# Patient Record
Sex: FEMALE | Race: WHITE | NOT HISPANIC OR LATINO | Employment: OTHER | URBAN - METROPOLITAN AREA
[De-identification: names, ages, dates, MRNs, and addresses within clinical notes are randomized per-mention and may not be internally consistent; named-entity substitution may affect disease eponyms.]

---
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
## 2018-01-08 ENCOUNTER — OFFICE VISIT (OUTPATIENT)
Dept: FAMILY MEDICINE CLINIC | Facility: CLINIC | Age: 18
End: 2018-01-08

## 2018-01-08 VITALS
BODY MASS INDEX: 33.07 KG/M2 | HEIGHT: 67 IN | DIASTOLIC BLOOD PRESSURE: 98 MMHG | OXYGEN SATURATION: 98 % | SYSTOLIC BLOOD PRESSURE: 164 MMHG | TEMPERATURE: 98.4 F | HEART RATE: 91 BPM | WEIGHT: 210.7 LBS

## 2018-01-08 DIAGNOSIS — Z00.00 ROUTINE GENERAL MEDICAL EXAMINATION AT A HEALTH CARE FACILITY: ICD-10-CM

## 2018-01-08 DIAGNOSIS — R63.1 INCREASED THIRST: ICD-10-CM

## 2018-01-08 DIAGNOSIS — N92.6 MENSTRUAL CYCLE PROBLEM: ICD-10-CM

## 2018-01-08 DIAGNOSIS — Z83.3 FAMILY HISTORY OF DIABETES MELLITUS: Primary | ICD-10-CM

## 2018-01-08 DIAGNOSIS — R42 DIZZINESS: ICD-10-CM

## 2018-01-08 DIAGNOSIS — I10 ESSENTIAL HYPERTENSION: ICD-10-CM

## 2018-01-08 DIAGNOSIS — R00.2 HEART PALPITATIONS: ICD-10-CM

## 2018-01-08 LAB — HBA1C MFR BLD: 5.5 %

## 2018-01-08 PROCEDURE — 93000 ELECTROCARDIOGRAM COMPLETE: CPT | Performed by: NURSE PRACTITIONER

## 2018-01-08 PROCEDURE — 83036 HEMOGLOBIN GLYCOSYLATED A1C: CPT | Performed by: NURSE PRACTITIONER

## 2018-01-08 PROCEDURE — 99214 OFFICE O/P EST MOD 30 MIN: CPT | Performed by: NURSE PRACTITIONER

## 2018-01-08 RX ORDER — LOSARTAN POTASSIUM 50 MG/1
50 TABLET ORAL DAILY
Qty: 90 TABLET | Refills: 3 | Status: SHIPPED | OUTPATIENT
Start: 2018-01-08 | End: 2018-03-19 | Stop reason: SDUPTHER

## 2018-01-08 NOTE — PROGRESS NOTES
"Myrna Bauman is a 17 y.o. female.     History of Present Illness   Patient's presenting to the office today with concerns of her blood pressure because she's had an elevated blood pressure the last 3 times that she's been to the immediate care center or the emergency room.  She went to the emergency room of her getting in a fight with her boyfriend and she went to the immediate care center for sick symptoms.  All 3 times that she was there her blood pressure was elevated.  Her blood pressure is also elevated today.  She has been having some headaches.    She's also concerned about having diabetes her father is a diabetic and also with her sister.  She's been having some dizziness and she feels like she is thirsty a lot.  She does eat consistently so her dizziness seems to be random.  She does states that she is having palpitations at times and feels her heart beat fast and that is associated with the dizziness at times.     She's on her period for the last 11 days she does not have an OB/GYN she's never had a Pap smear she's not any birth control.      The following portions of the patient's history were reviewed and updated as appropriate: allergies, current medications, past social history and problem list.    Review of Systems   Constitutional: Negative for fever.   Genitourinary: Positive for menstrual problem.   Neurological: Positive for dizziness and numbness.   All other systems reviewed and are negative.      Objective   /98 (BP Location: Right arm, Patient Position: Sitting)  Pulse 91  Temp 98.4 °F (36.9 °C)  Ht 170.2 cm (67.01\")  Wt 95.6 kg (210 lb 11.2 oz)  SpO2 98%  BMI 32.99 kg/m2  Physical Exam   Constitutional: She is oriented to person, place, and time. Vital signs are normal. She appears well-developed and well-nourished. No distress.   HENT:   Head: Normocephalic.   Cardiovascular: Normal rate, regular rhythm and normal heart sounds.    Pulmonary/Chest: Effort normal " and breath sounds normal.   Neurological: She is alert and oriented to person, place, and time. Gait normal.   Psychiatric: She has a normal mood and affect. Her behavior is normal. Judgment and thought content normal.   Vitals reviewed.      ECG 12 Lead  Date/Time: 1/8/2018 3:37 PM  Performed by: BERNARDO NICHOLAS  Authorized by: BERNARDO NICHOLAS   Comparison: not compared with previous ECG   Previous ECG: no previous ECG available  Rhythm: sinus rhythm  Rate: normal  Conduction: conduction normal  ST Segments: ST segments normal  T Waves: T waves normal  QRS axis: normal  Other: no other findings  Clinical impression: non-specific ECG  Comments: Sinus arrhythmia            Assessment/Plan   Problem List Items Addressed This Visit        Cardiovascular and Mediastinum    Essential hypertension    Relevant Medications    losartan (COZAAR) 50 MG tablet    Heart palpitations    Relevant Orders    Adult Transthoracic Echo Complete W/ Cont if Necessary Per Protocol    ECG 12 Lead    Cardiac Event Monitor       Genitourinary    Menstrual cycle problem    Relevant Orders    Ambulatory Referral to Obstetrics / Gynecology       Other    Family history of diabetes mellitus - Primary    Relevant Orders    POC Glycosylated Hemoglobin (Hb A1C) (Completed)    Increased thirst    Relevant Orders    POC Glycosylated Hemoglobin (Hb A1C) (Completed)    Dizziness    Relevant Orders    Adult Transthoracic Echo Complete W/ Cont if Necessary Per Protocol    Cardiac Event Monitor      Other Visit Diagnoses     Routine general medical examination at a health care facility        Relevant Orders    CBC & Differential    Comprehensive Metabolic Panel    Lipid Panel With LDL / HDL Ratio        rto in 4 weeks for BP check  Referral to OBGYN  Follow up after labs and test  Discussed need for weight loss, diet and exercise to help with BS and BP

## 2018-01-09 LAB
ALBUMIN SERPL-MCNC: 4.7 G/DL (ref 3.5–5.2)
ALBUMIN/GLOB SERPL: 1.4 G/DL
ALP SERPL-CCNC: 82 U/L (ref 39–117)
ALT SERPL-CCNC: 17 U/L (ref 1–33)
AST SERPL-CCNC: 19 U/L (ref 1–32)
BASOPHILS # BLD AUTO: 0.02 10*3/MM3 (ref 0–0.2)
BASOPHILS NFR BLD AUTO: 0.2 % (ref 0–1.5)
BILIRUB SERPL-MCNC: 0.4 MG/DL (ref 0.1–1.2)
BUN SERPL-MCNC: 10 MG/DL (ref 6–20)
BUN/CREAT SERPL: 13.2 (ref 7–25)
CALCIUM SERPL-MCNC: 9.9 MG/DL (ref 8.6–10.5)
CHLORIDE SERPL-SCNC: 99 MMOL/L (ref 98–107)
CHOLEST SERPL-MCNC: 145 MG/DL (ref 0–200)
CO2 SERPL-SCNC: 26.9 MMOL/L (ref 22–29)
CREAT SERPL-MCNC: 0.76 MG/DL (ref 0.57–1)
EOSINOPHIL # BLD AUTO: 0.08 10*3/MM3 (ref 0–0.7)
EOSINOPHIL NFR BLD AUTO: 0.9 % (ref 0.3–6.2)
ERYTHROCYTE [DISTWIDTH] IN BLOOD BY AUTOMATED COUNT: 13.8 % (ref 11.7–13)
GLOBULIN SER CALC-MCNC: 3.3 GM/DL
GLUCOSE SERPL-MCNC: 87 MG/DL (ref 65–99)
HCT VFR BLD AUTO: 43.1 % (ref 35.6–45.5)
HDLC SERPL-MCNC: 41 MG/DL (ref 40–60)
HGB BLD-MCNC: 13.6 G/DL (ref 11.9–15.5)
IMM GRANULOCYTES # BLD: 0.02 10*3/MM3 (ref 0–0.03)
IMM GRANULOCYTES NFR BLD: 0.2 % (ref 0–0.5)
LDLC SERPL CALC-MCNC: 95 MG/DL (ref 0–100)
LDLC/HDLC SERPL: 2.31 {RATIO}
LYMPHOCYTES # BLD AUTO: 1.56 10*3/MM3 (ref 0.9–4.8)
LYMPHOCYTES NFR BLD AUTO: 17.3 % (ref 19.6–45.3)
MCH RBC QN AUTO: 27.6 PG (ref 26.9–32)
MCHC RBC AUTO-ENTMCNC: 31.6 G/DL (ref 32.4–36.3)
MCV RBC AUTO: 87.4 FL (ref 80.5–98.2)
MONOCYTES # BLD AUTO: 0.87 10*3/MM3 (ref 0.2–1.2)
MONOCYTES NFR BLD AUTO: 9.6 % (ref 5–12)
NEUTROPHILS # BLD AUTO: 6.47 10*3/MM3 (ref 1.9–8.1)
NEUTROPHILS NFR BLD AUTO: 71.8 % (ref 42.7–76)
PLATELET # BLD AUTO: 274 10*3/MM3 (ref 140–500)
POTASSIUM SERPL-SCNC: 4.1 MMOL/L (ref 3.5–5.2)
PROT SERPL-MCNC: 8 G/DL (ref 6–8.5)
RBC # BLD AUTO: 4.93 10*6/MM3 (ref 3.9–5.2)
SODIUM SERPL-SCNC: 139 MMOL/L (ref 136–145)
TRIGL SERPL-MCNC: 46 MG/DL (ref 0–150)
VLDLC SERPL CALC-MCNC: 9.2 MG/DL (ref 5–40)
WBC # BLD AUTO: 9.02 10*3/MM3 (ref 4.5–10.7)

## 2018-01-22 ENCOUNTER — HOSPITAL ENCOUNTER (OUTPATIENT)
Dept: CARDIOLOGY | Facility: HOSPITAL | Age: 18
Discharge: HOME | End: 2018-01-22
Admitting: NURSE PRACTITIONER

## 2018-01-22 VITALS
DIASTOLIC BLOOD PRESSURE: 94 MMHG | HEART RATE: 94 BPM | SYSTOLIC BLOOD PRESSURE: 160 MMHG | HEIGHT: 67 IN | BODY MASS INDEX: 32.96 KG/M2 | WEIGHT: 210 LBS

## 2018-01-22 DIAGNOSIS — R42 DIZZINESS: ICD-10-CM

## 2018-01-22 DIAGNOSIS — R00.2 HEART PALPITATIONS: ICD-10-CM

## 2018-01-22 LAB
ASCENDING AORTA: 2.8 CM
BH CV ECHO MEAS - ACS: 1.9 CM
BH CV ECHO MEAS - AO MAX PG (FULL): 2.7 MMHG
BH CV ECHO MEAS - AO MAX PG: 9.6 MMHG
BH CV ECHO MEAS - AO MEAN PG (FULL): 1.5 MMHG
BH CV ECHO MEAS - AO MEAN PG: 5.4 MMHG
BH CV ECHO MEAS - AO ROOT AREA (BSA CORRECTED): 1.3
BH CV ECHO MEAS - AO ROOT AREA: 5.5 CM^2
BH CV ECHO MEAS - AO ROOT DIAM: 2.6 CM
BH CV ECHO MEAS - AO V2 MAX: 154.7 CM/SEC
BH CV ECHO MEAS - AO V2 MEAN: 111.9 CM/SEC
BH CV ECHO MEAS - AO V2 VTI: 26.6 CM
BH CV ECHO MEAS - AVA(I,A): 2.4 CM^2
BH CV ECHO MEAS - AVA(I,D): 2.4 CM^2
BH CV ECHO MEAS - AVA(V,A): 2.3 CM^2
BH CV ECHO MEAS - AVA(V,D): 2.3 CM^2
BH CV ECHO MEAS - BSA(HAYCOCK): 2.2 M^2
BH CV ECHO MEAS - BSA: 2.1 M^2
BH CV ECHO MEAS - BZI_BMI: 32.9 KILOGRAMS/M^2
BH CV ECHO MEAS - BZI_METRIC_HEIGHT: 170.2 CM
BH CV ECHO MEAS - BZI_METRIC_WEIGHT: 95.3 KG
BH CV ECHO MEAS - CONTRAST EF (2CH): 63.7 ML/M^2
BH CV ECHO MEAS - CONTRAST EF 4CH: 63.7 ML/M^2
BH CV ECHO MEAS - EDV(MOD-SP2): 124 ML
BH CV ECHO MEAS - EDV(MOD-SP4): 102 ML
BH CV ECHO MEAS - EDV(TEICH): 130.9 ML
BH CV ECHO MEAS - EF(CUBED): 72.6 %
BH CV ECHO MEAS - EF(MOD-SP2): 63.7 %
BH CV ECHO MEAS - EF(MOD-SP4): 63.7 %
BH CV ECHO MEAS - EF(TEICH): 63.9 %
BH CV ECHO MEAS - ESV(MOD-SP2): 45 ML
BH CV ECHO MEAS - ESV(MOD-SP4): 37 ML
BH CV ECHO MEAS - ESV(TEICH): 47.2 ML
BH CV ECHO MEAS - FS: 35 %
BH CV ECHO MEAS - IVS/LVPW: 1.2
BH CV ECHO MEAS - IVSD: 1.1 CM
BH CV ECHO MEAS - LAT PEAK E' VEL: 16 CM/SEC
BH CV ECHO MEAS - LV DIASTOLIC VOL/BSA (35-75): 49.4 ML/M^2
BH CV ECHO MEAS - LV MASS(C)D: 208.3 GRAMS
BH CV ECHO MEAS - LV MASS(C)DI: 100.9 GRAMS/M^2
BH CV ECHO MEAS - LV MAX PG: 6.9 MMHG
BH CV ECHO MEAS - LV MEAN PG: 3.9 MMHG
BH CV ECHO MEAS - LV SYSTOLIC VOL/BSA (12-30): 17.9 ML/M^2
BH CV ECHO MEAS - LV V1 MAX: 131.4 CM/SEC
BH CV ECHO MEAS - LV V1 MEAN: 91.9 CM/SEC
BH CV ECHO MEAS - LV V1 VTI: 23.5 CM
BH CV ECHO MEAS - LVIDD: 5.2 CM
BH CV ECHO MEAS - LVIDS: 3.4 CM
BH CV ECHO MEAS - LVLD AP2: 8.9 CM
BH CV ECHO MEAS - LVLD AP4: 8.7 CM
BH CV ECHO MEAS - LVLS AP2: 7.1 CM
BH CV ECHO MEAS - LVLS AP4: 6.7 CM
BH CV ECHO MEAS - LVOT AREA (M): 2.5 CM^2
BH CV ECHO MEAS - LVOT AREA: 2.7 CM^2
BH CV ECHO MEAS - LVOT DIAM: 1.8 CM
BH CV ECHO MEAS - LVPWD: 0.96 CM
BH CV ECHO MEAS - MED PEAK E' VEL: 11 CM/SEC
BH CV ECHO MEAS - MV A DUR: 0.11 SEC
BH CV ECHO MEAS - MV A MAX VEL: 75.2 CM/SEC
BH CV ECHO MEAS - MV DEC SLOPE: 381.7 CM/SEC^2
BH CV ECHO MEAS - MV DEC TIME: 0.21 SEC
BH CV ECHO MEAS - MV E MAX VEL: 78.1 CM/SEC
BH CV ECHO MEAS - MV E/A: 1
BH CV ECHO MEAS - MV MAX PG: 5 MMHG
BH CV ECHO MEAS - MV MEAN PG: 2.4 MMHG
BH CV ECHO MEAS - MV P1/2T MAX VEL: 79.1 CM/SEC
BH CV ECHO MEAS - MV P1/2T: 60.7 MSEC
BH CV ECHO MEAS - MV V2 MAX: 112 CM/SEC
BH CV ECHO MEAS - MV V2 MEAN: 75.2 CM/SEC
BH CV ECHO MEAS - MV V2 VTI: 23 CM
BH CV ECHO MEAS - MVA P1/2T LCG: 2.8 CM^2
BH CV ECHO MEAS - MVA(P1/2T): 3.6 CM^2
BH CV ECHO MEAS - MVA(VTI): 2.7 CM^2
BH CV ECHO MEAS - PA ACC TIME: 0.12 SEC
BH CV ECHO MEAS - PA MAX PG (FULL): 2 MMHG
BH CV ECHO MEAS - PA MAX PG: 7.1 MMHG
BH CV ECHO MEAS - PA PR(ACCEL): 26.7 MMHG
BH CV ECHO MEAS - PA V2 MAX: 132.9 CM/SEC
BH CV ECHO MEAS - PULM A REVS DUR: 0.1 SEC
BH CV ECHO MEAS - PULM A REVS VEL: 31 CM/SEC
BH CV ECHO MEAS - PULM DIAS VEL: 56.8 CM/SEC
BH CV ECHO MEAS - PULM S/D: 1.2
BH CV ECHO MEAS - PULM SYS VEL: 67.9 CM/SEC
BH CV ECHO MEAS - PVA(V,A): 2.6 CM^2
BH CV ECHO MEAS - PVA(V,D): 2.6 CM^2
BH CV ECHO MEAS - QP/QS: 0.93
BH CV ECHO MEAS - RAP SYSTOLE: 3 MMHG
BH CV ECHO MEAS - RV MAX PG: 5.1 MMHG
BH CV ECHO MEAS - RV MEAN PG: 3.1 MMHG
BH CV ECHO MEAS - RV V1 MAX: 113 CM/SEC
BH CV ECHO MEAS - RV V1 MEAN: 83.8 CM/SEC
BH CV ECHO MEAS - RV V1 VTI: 18.9 CM
BH CV ECHO MEAS - RVOT AREA: 3.1 CM^2
BH CV ECHO MEAS - RVOT DIAM: 2 CM
BH CV ECHO MEAS - RVSP: 18 MMHG
BH CV ECHO MEAS - SI(AO): 70.8 ML/M^2
BH CV ECHO MEAS - SI(CUBED): 50.1 ML/M^2
BH CV ECHO MEAS - SI(LVOT): 30.6 ML/M^2
BH CV ECHO MEAS - SI(MOD-SP2): 38.3 ML/M^2
BH CV ECHO MEAS - SI(MOD-SP4): 31.5 ML/M^2
BH CV ECHO MEAS - SI(TEICH): 40.5 ML/M^2
BH CV ECHO MEAS - SUP REN AO DIAM: 1.9 CM
BH CV ECHO MEAS - SV(AO): 146.2 ML
BH CV ECHO MEAS - SV(CUBED): 103.5 ML
BH CV ECHO MEAS - SV(LVOT): 63.2 ML
BH CV ECHO MEAS - SV(MOD-SP2): 79 ML
BH CV ECHO MEAS - SV(MOD-SP4): 65 ML
BH CV ECHO MEAS - SV(RVOT): 58.8 ML
BH CV ECHO MEAS - SV(TEICH): 83.7 ML
BH CV ECHO MEAS - TAPSE (>1.6): 1.5 CM2
BH CV ECHO MEAS - TR MAX VEL: 191.4 CM/SEC
BH CV XLRA - RV BASE: 2.3 CM
BH CV XLRA - TDI S': 14 CM/SEC
E/E' RATIO: 6
LEFT ATRIUM VOLUME INDEX: 22 ML/M2
LV EF 2D ECHO EST: 64 %
SINUS: 2.6 CM
STJ: 2 CM

## 2018-01-22 PROCEDURE — 93306 TTE W/DOPPLER COMPLETE: CPT

## 2018-01-22 PROCEDURE — 93306 TTE W/DOPPLER COMPLETE: CPT | Performed by: INTERNAL MEDICINE

## 2018-03-19 ENCOUNTER — OFFICE VISIT (OUTPATIENT)
Dept: FAMILY MEDICINE CLINIC | Facility: CLINIC | Age: 18
End: 2018-03-19

## 2018-03-19 VITALS
WEIGHT: 198.6 LBS | SYSTOLIC BLOOD PRESSURE: 150 MMHG | TEMPERATURE: 98.7 F | OXYGEN SATURATION: 98 % | BODY MASS INDEX: 31.17 KG/M2 | HEIGHT: 67 IN | DIASTOLIC BLOOD PRESSURE: 70 MMHG | HEART RATE: 122 BPM

## 2018-03-19 DIAGNOSIS — N92.1 MENORRHAGIA WITH IRREGULAR CYCLE: ICD-10-CM

## 2018-03-19 DIAGNOSIS — I10 ESSENTIAL HYPERTENSION: Primary | ICD-10-CM

## 2018-03-19 PROCEDURE — 99213 OFFICE O/P EST LOW 20 MIN: CPT | Performed by: NURSE PRACTITIONER

## 2018-03-19 RX ORDER — LOSARTAN POTASSIUM 50 MG/1
50 TABLET ORAL DAILY
Qty: 90 TABLET | Refills: 3 | Status: SHIPPED | OUTPATIENT
Start: 2018-03-19 | End: 2018-11-05

## 2018-03-19 NOTE — PROGRESS NOTES
"Myrna Bauman is a 17 y.o. female.     History of Present Illness   Patient presenting to the office today with issues concerning her period.  She states for the past few months her menstrual cycles have been 7-11 days and they're heavier also she's having some bilateral lower abdominal pain nothing makes it worse nothing makes it better is not related to what she eats or bowel movements.  Her mother had ovarian cancer.    Also she is needing her blood pressure medication refilled which she's taking daily as directed without any side effects her blood pressure is controlled at home she is yet to take her medication today.    The following portions of the patient's history were reviewed and updated as appropriate: allergies, current medications, past social history and problem list.    Review of Systems   Gastrointestinal: Positive for abdominal pain.   Neurological: Positive for dizziness.   All other systems reviewed and are negative.      Objective   /70 (BP Location: Left arm, Patient Position: Sitting)   Pulse (!) 122   Temp 98.7 °F (37.1 °C)   Ht 170.2 cm (67.01\")   Wt 90.1 kg (198 lb 9.6 oz)   SpO2 98%   BMI 31.10 kg/m²   Physical Exam   Constitutional: She is oriented to person, place, and time. Vital signs are normal. She appears well-developed and well-nourished. No distress.   HENT:   Head: Normocephalic.   Cardiovascular: Normal rate, regular rhythm and normal heart sounds.    Pulmonary/Chest: Effort normal and breath sounds normal.   Neurological: She is alert and oriented to person, place, and time. Gait normal.   Psychiatric: She has a normal mood and affect. Her behavior is normal. Judgment and thought content normal.   Vitals reviewed.      Assessment/Plan   Problem List Items Addressed This Visit        Cardiovascular and Mediastinum    Essential hypertension - Primary    Relevant Medications    losartan (COZAAR) 50 MG tablet       Genitourinary    Menorrhagia with " irregular cycle    Relevant Orders    Ambulatory Referral to Obstetrics / Gynecology      Other Visit Diagnoses    None.          rto in 6 mons

## 2018-10-30 ENCOUNTER — TELEPHONE (OUTPATIENT)
Dept: OBSTETRICS AND GYNECOLOGY | Facility: CLINIC | Age: 18
End: 2018-10-30

## 2018-11-05 ENCOUNTER — INITIAL PRENATAL (OUTPATIENT)
Dept: OBSTETRICS AND GYNECOLOGY | Facility: CLINIC | Age: 18
End: 2018-11-05

## 2018-11-05 ENCOUNTER — PROCEDURE VISIT (OUTPATIENT)
Dept: OBSTETRICS AND GYNECOLOGY | Facility: CLINIC | Age: 18
End: 2018-11-05

## 2018-11-05 VITALS — SYSTOLIC BLOOD PRESSURE: 121 MMHG | DIASTOLIC BLOOD PRESSURE: 85 MMHG | WEIGHT: 227.6 LBS | BODY MASS INDEX: 35.64 KG/M2

## 2018-11-05 DIAGNOSIS — Z34.03 PRIMIGRAVIDA IN THIRD TRIMESTER: ICD-10-CM

## 2018-11-05 DIAGNOSIS — O46.93 THIRD TRIMESTER BLEEDING: ICD-10-CM

## 2018-11-05 DIAGNOSIS — O10.919 HTN IN PREGNANCY, CHRONIC: ICD-10-CM

## 2018-11-05 DIAGNOSIS — Z36.89 ENCOUNTER FOR ULTRASOUND TO CHECK FETAL GROWTH: Primary | ICD-10-CM

## 2018-11-05 DIAGNOSIS — O10.919 CHRONIC HYPERTENSION IN PREGNANCY: ICD-10-CM

## 2018-11-05 DIAGNOSIS — Z3A.32 32 WEEKS GESTATION OF PREGNANCY: ICD-10-CM

## 2018-11-05 DIAGNOSIS — Z11.3 SCREEN FOR STD (SEXUALLY TRANSMITTED DISEASE): Primary | ICD-10-CM

## 2018-11-05 PROCEDURE — 76816 OB US FOLLOW-UP PER FETUS: CPT | Performed by: OBSTETRICS & GYNECOLOGY

## 2018-11-05 PROCEDURE — 99203 OFFICE O/P NEW LOW 30 MIN: CPT | Performed by: OBSTETRICS & GYNECOLOGY

## 2018-11-05 RX ORDER — CHLORAL HYDRATE 500 MG
CAPSULE ORAL
COMMUNITY
End: 2019-01-22

## 2018-11-05 RX ORDER — FAMOTIDINE 10 MG
10 TABLET ORAL 2 TIMES DAILY
COMMUNITY
End: 2019-01-22

## 2018-11-05 RX ORDER — ASPIRIN 81 MG/1
81 TABLET, CHEWABLE ORAL DAILY
COMMUNITY
End: 2018-12-09 | Stop reason: HOSPADM

## 2018-11-05 RX ORDER — PRENATAL VIT NO.126/IRON/FOLIC 28MG-0.8MG
TABLET ORAL DAILY
COMMUNITY
End: 2019-02-15

## 2018-11-05 RX ORDER — FERROUS SULFATE 325(65) MG
325 TABLET ORAL
COMMUNITY
End: 2019-01-22

## 2018-11-05 RX ORDER — NIFEDIPINE 10 MG/1
10 CAPSULE ORAL 3 TIMES DAILY
Status: ON HOLD | COMMUNITY
End: 2018-11-23

## 2018-11-05 NOTE — PROGRESS NOTES
Cc:  Initial pregnancy visit.  Patient transferring care from [facility].  Records not available.  Patient found out she was pregnant and went to [facility] for prenatal care.  She was on presumably an ACE inhibitor at the beginning of pregnancy, and then was switched to Nifedipine.  She states that her blood pressure has been well controlled with normal growth.  She reports good FM and no cramping/contractions.  Patient reports brown discharge/spotting from time to time, but states that there has been no explanation for this.  She has not had to stay in hospital during pregnancy.  Past medical, surgical, obstetrical, genetic, social and family histories reviewed.  Allergies and medications reviewed.  10 point ROS reviewed and all pertinent are negative.  Physical exam documented in chart.  Ultrasound today with normal growth and KEN.  A/P:  IUP at 32 weeks with chronic HTN in pregnancy.  - cHTN.  Will follow with BPP at 34-35 weeks.  Growth is normal and BP well controlled.  Delivery indicated at 37-39 weeks.  - Discussed maternal well being, location of delivery facility, set up of our practice.  Will obtain previous records.    I spent 35 minutes with patient and greater than 20 minutes in face to face counseling.

## 2018-11-06 ENCOUNTER — HOSPITAL ENCOUNTER (OUTPATIENT)
Facility: HOSPITAL | Age: 18
Setting detail: OBSERVATION
Discharge: HOME | End: 2018-11-06
Attending: OBSTETRICS & GYNECOLOGY | Admitting: OBSTETRICS & GYNECOLOGY

## 2018-11-06 VITALS
SYSTOLIC BLOOD PRESSURE: 127 MMHG | BODY MASS INDEX: 35.79 KG/M2 | WEIGHT: 228 LBS | HEIGHT: 67 IN | TEMPERATURE: 97.8 F | RESPIRATION RATE: 18 BRPM | DIASTOLIC BLOOD PRESSURE: 73 MMHG | HEART RATE: 94 BPM

## 2018-11-06 PROBLEM — Z34.90 PREGNANCY: Status: ACTIVE | Noted: 2018-11-06

## 2018-11-06 LAB
ALBUMIN SERPL-MCNC: 3.8 G/DL (ref 3.5–5.2)
ALBUMIN/GLOB SERPL: 1 G/DL
ALP SERPL-CCNC: 156 U/L (ref 39–117)
ALT SERPL W P-5'-P-CCNC: 9 U/L (ref 1–33)
ANION GAP SERPL CALCULATED.3IONS-SCNC: 14.3 MMOL/L
AST SERPL-CCNC: 13 U/L (ref 1–32)
BASOPHILS # BLD AUTO: 0.01 10*3/MM3 (ref 0–0.2)
BASOPHILS NFR BLD AUTO: 0.1 % (ref 0–1.5)
BILIRUB SERPL-MCNC: 0.3 MG/DL (ref 0.1–1.2)
BUN BLD-MCNC: 7 MG/DL (ref 6–20)
BUN/CREAT SERPL: 12.1 (ref 7–25)
CALCIUM SPEC-SCNC: 9.7 MG/DL (ref 8.6–10.5)
CHLORIDE SERPL-SCNC: 101 MMOL/L (ref 98–107)
CO2 SERPL-SCNC: 22.7 MMOL/L (ref 22–29)
CREAT BLD-MCNC: 0.58 MG/DL (ref 0.57–1)
CREAT UR-MCNC: 201.1 MG/DL
DEPRECATED RDW RBC AUTO: 42.9 FL (ref 37–54)
EOSINOPHIL # BLD AUTO: 0.05 10*3/MM3 (ref 0–0.7)
EOSINOPHIL NFR BLD AUTO: 0.3 % (ref 0.3–6.2)
ERYTHROCYTE [DISTWIDTH] IN BLOOD BY AUTOMATED COUNT: 13.2 % (ref 11.7–13)
GFR SERPL CREATININE-BSD FRML MDRD: 133 ML/MIN/1.73
GLOBULIN UR ELPH-MCNC: 3.9 GM/DL
GLUCOSE BLD-MCNC: 85 MG/DL (ref 65–99)
HCT VFR BLD AUTO: 41.8 % (ref 35.6–45.5)
HGB BLD-MCNC: 13.7 G/DL (ref 11.9–15.5)
LYMPHOCYTES # BLD AUTO: 1.64 10*3/MM3 (ref 0.9–4.8)
LYMPHOCYTES NFR BLD AUTO: 9.2 % (ref 19.6–45.3)
MCH RBC QN AUTO: 29.5 PG (ref 26.9–32)
MCHC RBC AUTO-ENTMCNC: 32.8 G/DL (ref 32.4–36.3)
MCV RBC AUTO: 89.9 FL (ref 80.5–98.2)
MONOCYTES # BLD AUTO: 1.32 10*3/MM3 (ref 0.2–1.2)
MONOCYTES NFR BLD AUTO: 7.4 % (ref 5–12)
NEUTROPHILS # BLD AUTO: 14.72 10*3/MM3 (ref 1.9–8.1)
NEUTROPHILS NFR BLD AUTO: 82.7 % (ref 42.7–76)
PLATELET # BLD AUTO: 227 10*3/MM3 (ref 140–500)
PMV BLD AUTO: 10.4 FL (ref 6–12)
POTASSIUM BLD-SCNC: 3.9 MMOL/L (ref 3.5–5.2)
PROT SERPL-MCNC: 7.7 G/DL (ref 6–8.5)
PROT UR-MCNC: 18 MG/DL
PROT/CREAT UR: 89.5 MG/G CREA (ref 0–200)
RBC # BLD AUTO: 4.65 10*6/MM3 (ref 3.9–5.2)
SODIUM BLD-SCNC: 138 MMOL/L (ref 136–145)
WBC NRBC COR # BLD: 17.8 10*3/MM3 (ref 4.5–10.7)

## 2018-11-06 PROCEDURE — 80053 COMPREHEN METABOLIC PANEL: CPT | Performed by: OBSTETRICS & GYNECOLOGY

## 2018-11-06 PROCEDURE — 84156 ASSAY OF PROTEIN URINE: CPT | Performed by: OBSTETRICS & GYNECOLOGY

## 2018-11-06 PROCEDURE — 85027 COMPLETE CBC AUTOMATED: CPT | Performed by: OBSTETRICS & GYNECOLOGY

## 2018-11-06 PROCEDURE — 59025 FETAL NON-STRESS TEST: CPT | Performed by: OBSTETRICS & GYNECOLOGY

## 2018-11-06 PROCEDURE — G0378 HOSPITAL OBSERVATION PER HR: HCPCS

## 2018-11-06 PROCEDURE — 82570 ASSAY OF URINE CREATININE: CPT | Performed by: OBSTETRICS & GYNECOLOGY

## 2018-11-06 PROCEDURE — 59025 FETAL NON-STRESS TEST: CPT

## 2018-11-06 RX ORDER — ONDANSETRON 4 MG/1
4 TABLET, ORALLY DISINTEGRATING ORAL EVERY 6 HOURS PRN
Status: DISCONTINUED | OUTPATIENT
Start: 2018-11-06 | End: 2018-11-07 | Stop reason: HOSPADM

## 2018-11-06 RX ORDER — ACETAMINOPHEN 325 MG/1
650 TABLET ORAL EVERY 6 HOURS PRN
COMMUNITY
End: 2018-12-04

## 2018-11-06 RX ADMIN — ONDANSETRON 4 MG: 4 TABLET, ORALLY DISINTEGRATING ORAL at 21:41

## 2018-11-07 NOTE — NON STRESS TEST
Debbie Bauman, a  at 32w5d with an KIESHA of 2018, by Last Menstrual Period, was seen at [facility] for a nonstress test.    Chief Complaint   Patient presents with   • Elevated Blood Pressure     208/81 at home, chronic, on procardia   • Abdominal Cramping     irreg   • Morning Sickness     unable too eat or drink today       Patient Active Problem List   Diagnosis   • Inattention   • Pain in radius   • Family history of diabetes mellitus   • Increased thirst   • Menstrual cycle problem   • Essential hypertension   • Heart palpitations   • Dizziness   • Menorrhagia with irregular cycle   • Chronic hypertension in pregnancy   • Pregnancy       Start Time:   Stop Time:

## 2018-11-07 NOTE — NURSING NOTE
Questioned patient at great length about suicidal ideation of 9/17. States she and her then boyfriend broke up very heatedly, and she briefly felt suicidal, and threatened to do self harm to threaten her boyfriend. She did not receive any mental health support, meds, etc... Denies any mental health problems at present, or any suicidal ideation or tendencies.

## 2018-11-08 LAB
A VAGINAE DNA VAG QL NAA+PROBE: NORMAL SCORE
BVAB2 DNA VAG QL NAA+PROBE: NORMAL SCORE
C ALBICANS DNA VAG QL NAA+PROBE: NEGATIVE
C GLABRATA DNA VAG QL NAA+PROBE: NEGATIVE
C TRACH RRNA SPEC QL NAA+PROBE: NEGATIVE
MEGA1 DNA VAG QL NAA+PROBE: NORMAL SCORE
N GONORRHOEA RRNA SPEC QL NAA+PROBE: NEGATIVE
T VAGINALIS RRNA SPEC QL NAA+PROBE: NEGATIVE

## 2018-11-23 ENCOUNTER — HOSPITAL ENCOUNTER (OUTPATIENT)
Facility: HOSPITAL | Age: 18
Setting detail: OBSERVATION
Discharge: HOME | End: 2018-11-23
Attending: OBSTETRICS & GYNECOLOGY | Admitting: OBSTETRICS & GYNECOLOGY

## 2018-11-23 VITALS
RESPIRATION RATE: 18 BRPM | OXYGEN SATURATION: 97 % | BODY MASS INDEX: 35.71 KG/M2 | HEART RATE: 66 BPM | HEIGHT: 67 IN | DIASTOLIC BLOOD PRESSURE: 68 MMHG | SYSTOLIC BLOOD PRESSURE: 117 MMHG | TEMPERATURE: 98 F

## 2018-11-23 PROBLEM — Z34.90 PREGNANT: Status: ACTIVE | Noted: 2018-11-23

## 2018-11-23 PROCEDURE — 59025 FETAL NON-STRESS TEST: CPT | Performed by: OBSTETRICS & GYNECOLOGY

## 2018-11-23 PROCEDURE — 59025 FETAL NON-STRESS TEST: CPT

## 2018-11-23 PROCEDURE — G0378 HOSPITAL OBSERVATION PER HR: HCPCS

## 2018-11-23 RX ORDER — CALCIUM CARBONATE 200(500)MG
2 TABLET,CHEWABLE ORAL
COMMUNITY
End: 2018-12-04

## 2018-11-23 RX ORDER — NIFEDIPINE 30 MG/1
30 TABLET, EXTENDED RELEASE ORAL DAILY
COMMUNITY
End: 2018-12-04 | Stop reason: SDUPTHER

## 2018-11-23 NOTE — NON STRESS TEST
Debbie Bauman, a  at 35w1d with an KIESHA of 2018, by Last Menstrual Period, was seen at [facility] for a nonstress test.    Chief Complaint   Patient presents with   • Leaking Fluid     pt states last saturday she lost her mucous plug.  She has been leaking fluid for the last couple of days, having to change her panties 3x last night.  She also c/o constant back pain and constant lower abdomenal pressure.  + FM, denies ctx or vaginal bleeding.  intercourse 1 week ago.  Pt states she is monitoring her BP with a diastolic of 90s last night. denies any s/s preeclampsia       Patient Active Problem List   Diagnosis   • Inattention   • Pain in radius   • Family history of diabetes mellitus   • Increased thirst   • Menstrual cycle problem   • Essential hypertension   • Heart palpitations   • Dizziness   • Menorrhagia with irregular cycle   • Chronic hypertension in pregnancy   • Pregnancy   • Pregnant       Start Time: 1640  Stop Time: 1740

## 2018-11-26 ENCOUNTER — PROCEDURE VISIT (OUTPATIENT)
Dept: OBSTETRICS AND GYNECOLOGY | Facility: CLINIC | Age: 18
End: 2018-11-26

## 2018-11-26 ENCOUNTER — ROUTINE PRENATAL (OUTPATIENT)
Dept: OBSTETRICS AND GYNECOLOGY | Facility: CLINIC | Age: 18
End: 2018-11-26

## 2018-11-26 VITALS — WEIGHT: 229 LBS | SYSTOLIC BLOOD PRESSURE: 141 MMHG | DIASTOLIC BLOOD PRESSURE: 87 MMHG | BODY MASS INDEX: 35.87 KG/M2

## 2018-11-26 DIAGNOSIS — O10.919 CHRONIC HYPERTENSION IN PREGNANCY: ICD-10-CM

## 2018-11-26 DIAGNOSIS — O99.513 RESPIRATORY SYSTEM DISEASE AFFECTING PREGNANCY IN THIRD TRIMESTER, ANTEPARTUM: ICD-10-CM

## 2018-11-26 DIAGNOSIS — Z3A.35 35 WEEKS GESTATION OF PREGNANCY: Primary | ICD-10-CM

## 2018-11-26 DIAGNOSIS — Z34.03 PRIMIGRAVIDA IN THIRD TRIMESTER: ICD-10-CM

## 2018-11-26 PROCEDURE — 99214 OFFICE O/P EST MOD 30 MIN: CPT | Performed by: OBSTETRICS & GYNECOLOGY

## 2018-11-26 PROCEDURE — 76819 FETAL BIOPHYS PROFIL W/O NST: CPT | Performed by: OBSTETRICS & GYNECOLOGY

## 2018-11-26 NOTE — PROGRESS NOTES
"CC: Pregnancy follow-up.  Patient has been feeling \"under the weather\" for the past 3 days.  She describes a cough and congestion with mild back pain.  She reports feeling \"hot\" and having a temperature.  No nausea/vomiting.  She reports frequent urination.  Fetal movement is good.  No major contractions or pain..  Vitals reviewed.  Temp: 101.6  Gen - alert and pleasant.  Cardio - slightly tachycardic but regular rhythm.  Pulm - CTA bilaterally.  Abdomen - gravid, nontender, no guarding or rebound.  Flank - no tenderness.  Urine dipstick with trace leukocytes, blood and bilirubin.    BPP 8/8 with normal KEN.  A/P:  IUP at 35 weeks with possible URI, influenza, UTI.  - Patient sent to [facility] care center for influenza testing.    - HTN is stable and fetal status reassuring.  - Patient needs close follow up.  - Discussed maternal well being.    I spent 25 minutes with patient and greater than 15 minutes in counseling face to face on above issues.  "

## 2018-11-27 ENCOUNTER — TELEPHONE (OUTPATIENT)
Dept: OBSTETRICS AND GYNECOLOGY | Facility: CLINIC | Age: 18
End: 2018-11-27

## 2018-11-27 NOTE — TELEPHONE ENCOUNTER
Patient was sent to ER yesterday for fever/cough.  Influenza test was reviewed by me and negative.  Attempted to call patient to check on her, but voicemail is not set up and patient did not answer.  Has follow up.

## 2018-11-28 ENCOUNTER — TELEPHONE (OUTPATIENT)
Dept: OBSTETRICS AND GYNECOLOGY | Facility: CLINIC | Age: 18
End: 2018-11-28

## 2018-11-28 ENCOUNTER — ANESTHESIA (OUTPATIENT)
Dept: LABOR AND DELIVERY | Facility: HOSPITAL | Age: 18
End: 2018-11-28

## 2018-11-28 ENCOUNTER — HOSPITAL ENCOUNTER (OUTPATIENT)
Facility: HOSPITAL | Age: 18
Setting detail: OBSERVATION
Discharge: HOME | End: 2018-11-28
Attending: OBSTETRICS & GYNECOLOGY | Admitting: OBSTETRICS & GYNECOLOGY

## 2018-11-28 ENCOUNTER — ANESTHESIA EVENT (OUTPATIENT)
Dept: LABOR AND DELIVERY | Facility: HOSPITAL | Age: 18
End: 2018-11-28

## 2018-11-28 VITALS
HEIGHT: 67 IN | HEART RATE: 77 BPM | RESPIRATION RATE: 18 BRPM | BODY MASS INDEX: 35.47 KG/M2 | SYSTOLIC BLOOD PRESSURE: 129 MMHG | OXYGEN SATURATION: 97 % | TEMPERATURE: 98 F | DIASTOLIC BLOOD PRESSURE: 79 MMHG | WEIGHT: 226 LBS

## 2018-11-28 LAB
ALBUMIN SERPL-MCNC: 3.2 G/DL (ref 3.5–5.2)
ALBUMIN/GLOB SERPL: 0.8 G/DL
ALP SERPL-CCNC: 178 U/L (ref 39–117)
ALT SERPL W P-5'-P-CCNC: 10 U/L (ref 1–33)
ANION GAP SERPL CALCULATED.3IONS-SCNC: 13.6 MMOL/L
AST SERPL-CCNC: 17 U/L (ref 1–32)
BACTERIA UR QL AUTO: ABNORMAL /HPF
BASOPHILS # BLD AUTO: 0.01 10*3/MM3 (ref 0–0.2)
BASOPHILS NFR BLD AUTO: 0.1 % (ref 0–1.5)
BILIRUB SERPL-MCNC: 0.2 MG/DL (ref 0.1–1.2)
BILIRUB UR QL STRIP: NEGATIVE
BUN BLD-MCNC: 7 MG/DL (ref 6–20)
BUN/CREAT SERPL: 13 (ref 7–25)
CALCIUM SPEC-SCNC: 9 MG/DL (ref 8.6–10.5)
CHLORIDE SERPL-SCNC: 106 MMOL/L (ref 98–107)
CLARITY UR: ABNORMAL
CO2 SERPL-SCNC: 20.4 MMOL/L (ref 22–29)
COD CRY URNS QL: ABNORMAL /HPF
COLOR UR: YELLOW
CREAT BLD-MCNC: 0.54 MG/DL (ref 0.57–1)
DEPRECATED RDW RBC AUTO: 40.5 FL (ref 37–54)
EOSINOPHIL # BLD AUTO: 0.1 10*3/MM3 (ref 0–0.7)
EOSINOPHIL NFR BLD AUTO: 1.1 % (ref 0.3–6.2)
ERYTHROCYTE [DISTWIDTH] IN BLOOD BY AUTOMATED COUNT: 13 % (ref 11.7–13)
GFR SERPL CREATININE-BSD FRML MDRD: 144 ML/MIN/1.73
GLOBULIN UR ELPH-MCNC: 3.8 GM/DL
GLUCOSE BLD-MCNC: 90 MG/DL (ref 65–99)
GLUCOSE UR STRIP-MCNC: NEGATIVE MG/DL
GP B STREP DNA SPEC QL NAA+PROBE: NEGATIVE
HCT VFR BLD AUTO: 37.6 % (ref 35.6–45.5)
HGB BLD-MCNC: 12.8 G/DL (ref 11.9–15.5)
HGB UR QL STRIP.AUTO: ABNORMAL
HYALINE CASTS UR QL AUTO: ABNORMAL /LPF
IMM GRANULOCYTES # BLD: 0.03 10*3/MM3 (ref 0–0.03)
IMM GRANULOCYTES NFR BLD: 0.3 % (ref 0–0.5)
KETONES UR QL STRIP: ABNORMAL
LEUKOCYTE ESTERASE UR QL STRIP.AUTO: ABNORMAL
LYMPHOCYTES # BLD AUTO: 1.24 10*3/MM3 (ref 0.9–4.8)
LYMPHOCYTES NFR BLD AUTO: 14.2 % (ref 19.6–45.3)
MCH RBC QN AUTO: 29 PG (ref 26.9–32)
MCHC RBC AUTO-ENTMCNC: 34 G/DL (ref 32.4–36.3)
MCV RBC AUTO: 85.3 FL (ref 80.5–98.2)
MONOCYTES # BLD AUTO: 0.96 10*3/MM3 (ref 0.2–1.2)
MONOCYTES NFR BLD AUTO: 11 % (ref 5–12)
NEUTROPHILS # BLD AUTO: 6.43 10*3/MM3 (ref 1.9–8.1)
NEUTROPHILS NFR BLD AUTO: 73.6 % (ref 42.7–76)
NITRITE UR QL STRIP: NEGATIVE
PH UR STRIP.AUTO: 5.5 [PH] (ref 5–8)
PLATELET # BLD AUTO: 205 10*3/MM3 (ref 140–500)
PMV BLD AUTO: 11.1 FL (ref 6–12)
POTASSIUM BLD-SCNC: 3.8 MMOL/L (ref 3.5–5.2)
PROT SERPL-MCNC: 7 G/DL (ref 6–8.5)
PROT UR QL STRIP: NEGATIVE
RBC # BLD AUTO: 4.41 10*6/MM3 (ref 3.9–5.2)
RBC # UR: ABNORMAL /HPF
REF LAB TEST METHOD: ABNORMAL
SODIUM BLD-SCNC: 140 MMOL/L (ref 136–145)
SP GR UR STRIP: >=1.03 (ref 1–1.03)
SQUAMOUS #/AREA URNS HPF: ABNORMAL /HPF
UROBILINOGEN UR QL STRIP: ABNORMAL
WBC NRBC COR # BLD: 8.74 10*3/MM3 (ref 4.5–10.7)
WBC UR QL AUTO: ABNORMAL /HPF

## 2018-11-28 PROCEDURE — 99214 OFFICE O/P EST MOD 30 MIN: CPT | Performed by: OBSTETRICS & GYNECOLOGY

## 2018-11-28 PROCEDURE — G0378 HOSPITAL OBSERVATION PER HR: HCPCS

## 2018-11-28 PROCEDURE — 87086 URINE CULTURE/COLONY COUNT: CPT | Performed by: OBSTETRICS & GYNECOLOGY

## 2018-11-28 PROCEDURE — 81001 URINALYSIS AUTO W/SCOPE: CPT | Performed by: OBSTETRICS & GYNECOLOGY

## 2018-11-28 PROCEDURE — 59025 FETAL NON-STRESS TEST: CPT

## 2018-11-28 PROCEDURE — 85025 COMPLETE CBC W/AUTO DIFF WBC: CPT | Performed by: OBSTETRICS & GYNECOLOGY

## 2018-11-28 PROCEDURE — 96374 THER/PROPH/DIAG INJ IV PUSH: CPT

## 2018-11-28 PROCEDURE — 80053 COMPREHEN METABOLIC PANEL: CPT | Performed by: OBSTETRICS & GYNECOLOGY

## 2018-11-28 PROCEDURE — 59025 FETAL NON-STRESS TEST: CPT | Performed by: OBSTETRICS & GYNECOLOGY

## 2018-11-28 PROCEDURE — 25010000002 PROMETHAZINE PER 50 MG: Performed by: OBSTETRICS & GYNECOLOGY

## 2018-11-28 RX ORDER — ONDANSETRON 2 MG/ML
4 INJECTION INTRAMUSCULAR; INTRAVENOUS ONCE AS NEEDED
Status: DISCONTINUED | OUTPATIENT
Start: 2018-11-28 | End: 2018-11-28 | Stop reason: HOSPADM

## 2018-11-28 RX ORDER — FAMOTIDINE 10 MG/ML
20 INJECTION, SOLUTION INTRAVENOUS ONCE AS NEEDED
Status: DISCONTINUED | OUTPATIENT
Start: 2018-11-28 | End: 2018-11-28 | Stop reason: HOSPADM

## 2018-11-28 RX ORDER — AMOXICILLIN AND CLAVULANATE POTASSIUM 875; 125 MG/1; MG/1
1 TABLET, FILM COATED ORAL EVERY 12 HOURS SCHEDULED
Status: DISCONTINUED | OUTPATIENT
Start: 2018-11-28 | End: 2018-11-28 | Stop reason: HOSPADM

## 2018-11-28 RX ORDER — PROMETHAZINE HYDROCHLORIDE 25 MG/ML
12.5 INJECTION, SOLUTION INTRAMUSCULAR; INTRAVENOUS ONCE
Status: COMPLETED | OUTPATIENT
Start: 2018-11-28 | End: 2018-11-28

## 2018-11-28 RX ORDER — DIPHENHYDRAMINE HCL 25 MG
25 CAPSULE ORAL EVERY 6 HOURS PRN
Status: DISCONTINUED | OUTPATIENT
Start: 2018-11-28 | End: 2018-11-28 | Stop reason: HOSPADM

## 2018-11-28 RX ORDER — AMOXICILLIN AND CLAVULANATE POTASSIUM 875; 125 MG/1; MG/1
1 TABLET, FILM COATED ORAL 2 TIMES DAILY
Qty: 14 TABLET | Refills: 0 | Status: SHIPPED | OUTPATIENT
Start: 2018-11-28 | End: 2018-12-04

## 2018-11-28 RX ORDER — EPHEDRINE SULFATE 50 MG/ML
5 INJECTION, SOLUTION INTRAVENOUS AS NEEDED
Status: DISCONTINUED | OUTPATIENT
Start: 2018-11-28 | End: 2018-11-28 | Stop reason: HOSPADM

## 2018-11-28 RX ORDER — ERYTHROMYCIN 5 MG/G
OINTMENT OPHTHALMIC
Status: DISCONTINUED
Start: 2018-11-28 | End: 2018-11-28 | Stop reason: HOSPADM

## 2018-11-28 RX ORDER — PHYTONADIONE 1 MG/.5ML
INJECTION, EMULSION INTRAMUSCULAR; INTRAVENOUS; SUBCUTANEOUS
Status: DISCONTINUED
Start: 2018-11-28 | End: 2018-11-28 | Stop reason: HOSPADM

## 2018-11-28 RX ADMIN — SODIUM CHLORIDE, POTASSIUM CHLORIDE, SODIUM LACTATE AND CALCIUM CHLORIDE 1000 ML: 600; 310; 30; 20 INJECTION, SOLUTION INTRAVENOUS at 16:07

## 2018-11-28 RX ADMIN — PROMETHAZINE HYDROCHLORIDE 12.5 MG: 25 INJECTION INTRAMUSCULAR; INTRAVENOUS at 16:12

## 2018-11-28 NOTE — TELEPHONE ENCOUNTER
Sarah    Please have her come to triage at [facility].    Thanks    [place]          OB pt is 35 weeks, 6 days and said she can't keep any food, drinks or medication down for about 3 days now. She also said she normally has high blood pressure but she took her blood pressure yesterday and it was 101/69. Please advise. # [phone]

## 2018-11-30 LAB — BACTERIA SPEC AEROBE CULT: NORMAL

## 2018-12-04 ENCOUNTER — PROCEDURE VISIT (OUTPATIENT)
Dept: OBSTETRICS AND GYNECOLOGY | Facility: CLINIC | Age: 18
End: 2018-12-04

## 2018-12-04 ENCOUNTER — ROUTINE PRENATAL (OUTPATIENT)
Dept: OBSTETRICS AND GYNECOLOGY | Facility: CLINIC | Age: 18
End: 2018-12-04

## 2018-12-04 VITALS — BODY MASS INDEX: 35.87 KG/M2 | DIASTOLIC BLOOD PRESSURE: 96 MMHG | SYSTOLIC BLOOD PRESSURE: 144 MMHG | WEIGHT: 229 LBS

## 2018-12-04 DIAGNOSIS — Z3A.36 36 WEEKS GESTATION OF PREGNANCY: ICD-10-CM

## 2018-12-04 DIAGNOSIS — O10.919 HTN IN PREGNANCY, CHRONIC: Primary | ICD-10-CM

## 2018-12-04 DIAGNOSIS — O10.919 CHRONIC HYPERTENSION IN PREGNANCY: ICD-10-CM

## 2018-12-04 DIAGNOSIS — Z34.03 PRIMIGRAVIDA IN THIRD TRIMESTER: Primary | ICD-10-CM

## 2018-12-04 PROCEDURE — 76816 OB US FOLLOW-UP PER FETUS: CPT | Performed by: OBSTETRICS & GYNECOLOGY

## 2018-12-04 PROCEDURE — 76819 FETAL BIOPHYS PROFIL W/O NST: CPT | Performed by: OBSTETRICS & GYNECOLOGY

## 2018-12-04 PROCEDURE — 99214 OFFICE O/P EST MOD 30 MIN: CPT | Performed by: OBSTETRICS & GYNECOLOGY

## 2018-12-04 RX ORDER — NIFEDIPINE 30 MG/1
60 TABLET, EXTENDED RELEASE ORAL DAILY
Qty: 14 TABLET | Refills: 1 | Status: SHIPPED | OUTPATIENT
Start: 2018-12-04 | End: 2019-01-22

## 2018-12-04 NOTE — PROGRESS NOTES
Cc:  Pregnancy follow up.  C/o headache and elevated BP yesterday, she checked her BP at home, she reports to have been 164/90.  She states she took some Tylenol and headache resolved.  She feels improved today.  She denies visual symptoms, chest pain or headache currently.  Pt also reports having a head cold and is taking NyQuil.  Pt never started the antibiotic that was prescribed last week as she states she was feeling a little better.  Ultimately, her urine culture was negative.  Patient reports good FM.  No bleeding or spotting.  Vitals reviewed.  BP repeated at 144/96.  Gen - alert and talkative.  Abdomen - gravid, nontender, no guarding or rebound.  GBS negative.  Ultrasound with BPP 8/8 with KEN of 9.  Normal growth.  A/P:  IUP at 36 weeks with cHTN  - cHTN.  Blood pressure is borderline.  Will increase Procardia.  Patient to remain off work.  Start 24 hour urine today and return tomorrow.  Patient with KEN between 8 and 9, which is adequate.  However, I would like her to follow up with another BPP and see Vivek on Thursday or Friday.  Consideration for induction between 37-38 weeks EGA.  Cervix is more favorable today.  - Answered questions about breast feeding, circumcision, pediatrician, cord blood banking.    I spent 25 minutes with patient and greater than 15 minutes in counseling face to face on above issues.

## 2018-12-06 LAB
ALBUMIN SERPL-MCNC: 3.9 G/DL (ref 3.5–5.5)
ALBUMIN/GLOB SERPL: 1.3 {RATIO} (ref 1.2–2.2)
ALP SERPL-CCNC: 226 IU/L (ref 39–117)
ALT SERPL-CCNC: 15 IU/L (ref 0–32)
AST SERPL-CCNC: 16 IU/L (ref 0–40)
BASOPHILS # BLD AUTO: 0 X10E3/UL (ref 0–0.2)
BASOPHILS NFR BLD AUTO: 0 %
BILIRUB SERPL-MCNC: 0.3 MG/DL (ref 0–1.2)
BUN SERPL-MCNC: 7 MG/DL (ref 6–20)
BUN/CREAT SERPL: 10 (ref 9–23)
CALCIUM SERPL-MCNC: 9.4 MG/DL (ref 8.7–10.2)
CHLORIDE SERPL-SCNC: 101 MMOL/L (ref 96–106)
CO2 SERPL-SCNC: 20 MMOL/L (ref 20–29)
CREAT 24H UR-MRATE: 1406 MG/24 HR (ref 800–1800)
CREAT CL 24H UR+SERPL-VRATE: 140 ML/MIN (ref 88–128)
CREAT SERPL-MCNC: 0.7 MG/DL (ref 0.57–1)
CREAT UR-MCNC: 87.9 MG/DL
EOSINOPHIL # BLD AUTO: 0.1 X10E3/UL (ref 0–0.4)
EOSINOPHIL NFR BLD AUTO: 1 %
ERYTHROCYTE [DISTWIDTH] IN BLOOD BY AUTOMATED COUNT: 13.8 % (ref 12.3–15.4)
GLOBULIN SER CALC-MCNC: 2.9 G/DL (ref 1.5–4.5)
GLUCOSE SERPL-MCNC: 122 MG/DL (ref 65–99)
HCT VFR BLD AUTO: 38.7 % (ref 34–46.6)
HGB BLD-MCNC: 13.6 G/DL (ref 11.1–15.9)
IMM GRANULOCYTES # BLD: 0 X10E3/UL (ref 0–0.1)
IMM GRANULOCYTES NFR BLD: 0 %
LYMPHOCYTES # BLD AUTO: 2.2 X10E3/UL (ref 0.7–3.1)
LYMPHOCYTES NFR BLD AUTO: 15 %
MCH RBC QN AUTO: 29.2 PG (ref 26.6–33)
MCHC RBC AUTO-ENTMCNC: 35.1 G/DL (ref 31.5–35.7)
MCV RBC AUTO: 83 FL (ref 79–97)
MONOCYTES # BLD AUTO: 0.7 X10E3/UL (ref 0.1–0.9)
MONOCYTES NFR BLD AUTO: 5 %
NEUTROPHILS # BLD AUTO: 11.3 X10E3/UL (ref 1.4–7)
NEUTROPHILS NFR BLD AUTO: 79 %
PLATELET # BLD AUTO: 266 X10E3/UL (ref 150–379)
POTASSIUM SERPL-SCNC: 4.3 MMOL/L (ref 3.5–5.2)
PROT 24H UR-MRATE: 227 MG/24 HR (ref 30–150)
PROT SERPL-MCNC: 6.8 G/DL (ref 6–8.5)
PROT UR-MCNC: 14.2 MG/DL
RBC # BLD AUTO: 4.65 X10E6/UL (ref 3.77–5.28)
SODIUM SERPL-SCNC: 138 MMOL/L (ref 134–144)
WBC # BLD AUTO: 14.4 X10E3/UL (ref 3.4–10.8)

## 2018-12-07 ENCOUNTER — ANESTHESIA (OUTPATIENT)
Dept: LABOR AND DELIVERY | Facility: HOSPITAL | Age: 18
End: 2018-12-07

## 2018-12-07 ENCOUNTER — HOSPITAL ENCOUNTER (INPATIENT)
Facility: HOSPITAL | Age: 18
LOS: 2 days | Discharge: HOME | End: 2018-12-09
Attending: OBSTETRICS & GYNECOLOGY | Admitting: OBSTETRICS & GYNECOLOGY

## 2018-12-07 ENCOUNTER — ANESTHESIA EVENT (OUTPATIENT)
Dept: LABOR AND DELIVERY | Facility: HOSPITAL | Age: 18
End: 2018-12-07

## 2018-12-07 LAB
ABO GROUP BLD: NORMAL
ALBUMIN SERPL-MCNC: 3.8 G/DL (ref 3.5–5.2)
ALBUMIN/GLOB SERPL: 1 G/DL
ALP SERPL-CCNC: 229 U/L (ref 39–117)
ALT SERPL W P-5'-P-CCNC: 13 U/L (ref 1–33)
AMPHET+METHAMPHET UR QL: NEGATIVE
ANION GAP SERPL CALCULATED.3IONS-SCNC: 13.1 MMOL/L
AST SERPL-CCNC: 14 U/L (ref 1–32)
BARBITURATES UR QL SCN: NEGATIVE
BENZODIAZ UR QL SCN: NEGATIVE
BILIRUB SERPL-MCNC: 0.3 MG/DL (ref 0.1–1.2)
BLD GP AB SCN SERPL QL: NEGATIVE
BUN BLD-MCNC: 7 MG/DL (ref 6–20)
BUN/CREAT SERPL: 11.7 (ref 7–25)
CALCIUM SPEC-SCNC: 9.9 MG/DL (ref 8.6–10.5)
CANNABINOIDS SERPL QL: NEGATIVE
CHLORIDE SERPL-SCNC: 102 MMOL/L (ref 98–107)
CO2 SERPL-SCNC: 22.9 MMOL/L (ref 22–29)
COCAINE UR QL: NEGATIVE
CREAT BLD-MCNC: 0.6 MG/DL (ref 0.57–1)
DEPRECATED RDW RBC AUTO: 41.8 FL (ref 37–54)
ERYTHROCYTE [DISTWIDTH] IN BLOOD BY AUTOMATED COUNT: 13.1 % (ref 11.7–13)
GFR SERPL CREATININE-BSD FRML MDRD: 127 ML/MIN/1.73
GLOBULIN UR ELPH-MCNC: 3.7 GM/DL
GLUCOSE BLD-MCNC: 90 MG/DL (ref 65–99)
HBV SURFACE AG SERPL QL IA: NORMAL
HCT VFR BLD AUTO: 40.9 % (ref 35.6–45.5)
HCV AB SER DONR QL: NORMAL
HGB BLD-MCNC: 13.4 G/DL (ref 11.9–15.5)
HIV1 P24 AG SER QL: NORMAL
HIV1+2 AB SER QL: NORMAL
MCH RBC QN AUTO: 29.1 PG (ref 26.9–32)
MCHC RBC AUTO-ENTMCNC: 32.8 G/DL (ref 32.4–36.3)
MCV RBC AUTO: 88.7 FL (ref 80.5–98.2)
METHADONE UR QL SCN: NEGATIVE
OPIATES UR QL: NEGATIVE
OXYCODONE UR QL SCN: NEGATIVE
PLATELET # BLD AUTO: 245 10*3/MM3 (ref 140–500)
PMV BLD AUTO: 10.6 FL (ref 6–12)
POTASSIUM BLD-SCNC: 4.2 MMOL/L (ref 3.5–5.2)
PROT SERPL-MCNC: 7.5 G/DL (ref 6–8.5)
RBC # BLD AUTO: 4.61 10*6/MM3 (ref 3.9–5.2)
RH BLD: POSITIVE
SODIUM BLD-SCNC: 138 MMOL/L (ref 136–145)
T&S EXPIRATION DATE: NORMAL
WBC NRBC COR # BLD: 21.22 10*3/MM3 (ref 4.5–10.7)

## 2018-12-07 PROCEDURE — 86901 BLOOD TYPING SEROLOGIC RH(D): CPT | Performed by: OBSTETRICS & GYNECOLOGY

## 2018-12-07 PROCEDURE — 80307 DRUG TEST PRSMV CHEM ANLYZR: CPT | Performed by: OBSTETRICS & GYNECOLOGY

## 2018-12-07 PROCEDURE — 86900 BLOOD TYPING SEROLOGIC ABO: CPT | Performed by: OBSTETRICS & GYNECOLOGY

## 2018-12-07 PROCEDURE — 85027 COMPLETE CBC AUTOMATED: CPT | Performed by: OBSTETRICS & GYNECOLOGY

## 2018-12-07 PROCEDURE — 87899 AGENT NOS ASSAY W/OPTIC: CPT | Performed by: OBSTETRICS & GYNECOLOGY

## 2018-12-07 PROCEDURE — 87340 HEPATITIS B SURFACE AG IA: CPT | Performed by: OBSTETRICS & GYNECOLOGY

## 2018-12-07 PROCEDURE — 0HQ9XZZ REPAIR PERINEUM SKIN, EXTERNAL APPROACH: ICD-10-PCS | Performed by: OBSTETRICS & GYNECOLOGY

## 2018-12-07 PROCEDURE — 86850 RBC ANTIBODY SCREEN: CPT | Performed by: OBSTETRICS & GYNECOLOGY

## 2018-12-07 PROCEDURE — C1755 CATHETER, INTRASPINAL: HCPCS | Performed by: ANESTHESIOLOGY

## 2018-12-07 PROCEDURE — 86592 SYPHILIS TEST NON-TREP QUAL: CPT | Performed by: OBSTETRICS & GYNECOLOGY

## 2018-12-07 PROCEDURE — 86803 HEPATITIS C AB TEST: CPT | Performed by: OBSTETRICS & GYNECOLOGY

## 2018-12-07 PROCEDURE — 86762 RUBELLA ANTIBODY: CPT | Performed by: OBSTETRICS & GYNECOLOGY

## 2018-12-07 PROCEDURE — 80053 COMPREHEN METABOLIC PANEL: CPT | Performed by: OBSTETRICS & GYNECOLOGY

## 2018-12-07 PROCEDURE — 10907ZC DRAINAGE OF AMNIOTIC FLUID, THERAPEUTIC FROM PRODUCTS OF CONCEPTION, VIA NATURAL OR ARTIFICIAL OPENING: ICD-10-PCS | Performed by: OBSTETRICS & GYNECOLOGY

## 2018-12-07 PROCEDURE — G0432 EIA HIV-1/HIV-2 SCREEN: HCPCS | Performed by: OBSTETRICS & GYNECOLOGY

## 2018-12-07 PROCEDURE — 59410 OBSTETRICAL CARE: CPT | Performed by: OBSTETRICS & GYNECOLOGY

## 2018-12-07 RX ORDER — OXYTOCIN-SODIUM CHLORIDE 0.9% IV SOLN 30 UNIT/500ML 30-0.9/5 UT/ML-%
125 SOLUTION INTRAVENOUS CONTINUOUS PRN
Status: COMPLETED | OUTPATIENT
Start: 2018-12-07 | End: 2018-12-07

## 2018-12-07 RX ORDER — MISOPROSTOL 200 UG/1
800 TABLET ORAL AS NEEDED
Status: DISCONTINUED | OUTPATIENT
Start: 2018-12-07 | End: 2018-12-09 | Stop reason: HOSPADM

## 2018-12-07 RX ORDER — PROMETHAZINE HYDROCHLORIDE 12.5 MG/1
12.5 TABLET ORAL EVERY 4 HOURS PRN
Status: DISCONTINUED | OUTPATIENT
Start: 2018-12-07 | End: 2018-12-09 | Stop reason: HOSPADM

## 2018-12-07 RX ORDER — FAMOTIDINE 10 MG/ML
20 INJECTION, SOLUTION INTRAVENOUS ONCE AS NEEDED
Status: DISCONTINUED | OUTPATIENT
Start: 2018-12-07 | End: 2018-12-07 | Stop reason: HOSPADM

## 2018-12-07 RX ORDER — OXYTOCIN-SODIUM CHLORIDE 0.9% IV SOLN 30 UNIT/500ML 30-0.9/5 UT/ML-%
999 SOLUTION INTRAVENOUS ONCE
Status: DISCONTINUED | OUTPATIENT
Start: 2018-12-07 | End: 2018-12-09 | Stop reason: HOSPADM

## 2018-12-07 RX ORDER — SODIUM CHLORIDE, SODIUM LACTATE, POTASSIUM CHLORIDE, CALCIUM CHLORIDE 600; 310; 30; 20 MG/100ML; MG/100ML; MG/100ML; MG/100ML
125 INJECTION, SOLUTION INTRAVENOUS CONTINUOUS
Status: DISCONTINUED | OUTPATIENT
Start: 2018-12-07 | End: 2018-12-07

## 2018-12-07 RX ORDER — ONDANSETRON 2 MG/ML
4 INJECTION INTRAMUSCULAR; INTRAVENOUS ONCE AS NEEDED
Status: DISCONTINUED | OUTPATIENT
Start: 2018-12-07 | End: 2018-12-07 | Stop reason: HOSPADM

## 2018-12-07 RX ORDER — NIFEDIPINE 30 MG/1
30 TABLET, EXTENDED RELEASE ORAL ONCE
Status: COMPLETED | OUTPATIENT
Start: 2018-12-07 | End: 2018-12-07

## 2018-12-07 RX ORDER — NIFEDIPINE 60 MG/1
60 TABLET, EXTENDED RELEASE ORAL DAILY
Status: DISCONTINUED | OUTPATIENT
Start: 2018-12-08 | End: 2018-12-09 | Stop reason: HOSPADM

## 2018-12-07 RX ORDER — TERBUTALINE SULFATE 1 MG/ML
0.25 INJECTION, SOLUTION SUBCUTANEOUS AS NEEDED
Status: DISCONTINUED | OUTPATIENT
Start: 2018-12-07 | End: 2018-12-07 | Stop reason: HOSPADM

## 2018-12-07 RX ORDER — DIPHENHYDRAMINE HCL 25 MG
25 CAPSULE ORAL EVERY 6 HOURS PRN
Status: DISCONTINUED | OUTPATIENT
Start: 2018-12-07 | End: 2018-12-07 | Stop reason: HOSPADM

## 2018-12-07 RX ORDER — PHYTONADIONE 1 MG/.5ML
INJECTION, EMULSION INTRAMUSCULAR; INTRAVENOUS; SUBCUTANEOUS
Status: DISPENSED
Start: 2018-12-07 | End: 2018-12-08

## 2018-12-07 RX ORDER — ERYTHROMYCIN 5 MG/G
OINTMENT OPHTHALMIC
Status: DISPENSED
Start: 2018-12-07 | End: 2018-12-08

## 2018-12-07 RX ORDER — IBUPROFEN 800 MG/1
800 TABLET ORAL EVERY 8 HOURS PRN
Status: DISCONTINUED | OUTPATIENT
Start: 2018-12-07 | End: 2018-12-09 | Stop reason: HOSPADM

## 2018-12-07 RX ORDER — DOCUSATE SODIUM 100 MG/1
100 CAPSULE, LIQUID FILLED ORAL 2 TIMES DAILY PRN
Status: DISCONTINUED | OUTPATIENT
Start: 2018-12-07 | End: 2018-12-09 | Stop reason: HOSPADM

## 2018-12-07 RX ORDER — SODIUM CHLORIDE 0.9 % (FLUSH) 0.9 %
1-10 SYRINGE (ML) INJECTION AS NEEDED
Status: DISCONTINUED | OUTPATIENT
Start: 2018-12-07 | End: 2018-12-09 | Stop reason: HOSPADM

## 2018-12-07 RX ORDER — MISOPROSTOL 200 UG/1
800 TABLET ORAL ONCE
Status: COMPLETED | OUTPATIENT
Start: 2018-12-07 | End: 2018-12-07

## 2018-12-07 RX ORDER — ACETAMINOPHEN 500 MG
1000 TABLET ORAL ONCE
Status: COMPLETED | OUTPATIENT
Start: 2018-12-07 | End: 2018-12-07

## 2018-12-07 RX ORDER — HYDROCODONE BITARTRATE AND ACETAMINOPHEN 5; 325 MG/1; MG/1
1 TABLET ORAL EVERY 6 HOURS PRN
Status: DISCONTINUED | OUTPATIENT
Start: 2018-12-07 | End: 2018-12-09 | Stop reason: HOSPADM

## 2018-12-07 RX ORDER — OXYTOCIN-SODIUM CHLORIDE 0.9% IV SOLN 30 UNIT/500ML 30-0.9/5 UT/ML-%
2 SOLUTION INTRAVENOUS
Status: DISCONTINUED | OUTPATIENT
Start: 2018-12-07 | End: 2018-12-07

## 2018-12-07 RX ORDER — ZOLPIDEM TARTRATE 5 MG/1
5 TABLET ORAL NIGHTLY PRN
Status: DISCONTINUED | OUTPATIENT
Start: 2018-12-07 | End: 2018-12-09 | Stop reason: HOSPADM

## 2018-12-07 RX ORDER — EPHEDRINE SULFATE 50 MG/ML
5 INJECTION, SOLUTION INTRAVENOUS AS NEEDED
Status: DISCONTINUED | OUTPATIENT
Start: 2018-12-07 | End: 2018-12-07 | Stop reason: HOSPADM

## 2018-12-07 RX ORDER — OXYTOCIN-SODIUM CHLORIDE 0.9% IV SOLN 30 UNIT/500ML 30-0.9/5 UT/ML-%
250 SOLUTION INTRAVENOUS CONTINUOUS
Status: ACTIVE | OUTPATIENT
Start: 2018-12-07 | End: 2018-12-07

## 2018-12-07 RX ORDER — BISACODYL 10 MG
10 SUPPOSITORY, RECTAL RECTAL DAILY PRN
Status: DISCONTINUED | OUTPATIENT
Start: 2018-12-08 | End: 2018-12-09 | Stop reason: HOSPADM

## 2018-12-07 RX ORDER — METHYLERGONOVINE MALEATE 0.2 MG/ML
200 INJECTION INTRAVENOUS ONCE AS NEEDED
Status: DISCONTINUED | OUTPATIENT
Start: 2018-12-07 | End: 2018-12-09 | Stop reason: HOSPADM

## 2018-12-07 RX ORDER — PRENATAL VIT NO.126/IRON/FOLIC 28MG-0.8MG
1 TABLET ORAL DAILY
Status: DISCONTINUED | OUTPATIENT
Start: 2018-12-08 | End: 2018-12-09 | Stop reason: HOSPADM

## 2018-12-07 RX ORDER — CARBOPROST TROMETHAMINE 250 UG/ML
250 INJECTION, SOLUTION INTRAMUSCULAR AS NEEDED
Status: DISCONTINUED | OUTPATIENT
Start: 2018-12-07 | End: 2018-12-09 | Stop reason: HOSPADM

## 2018-12-07 RX ADMIN — HYDROCODONE BITARTRATE AND ACETAMINOPHEN 1 TABLET: 5; 325 TABLET ORAL at 22:05

## 2018-12-07 RX ADMIN — MISOPROSTOL 800 MCG: 200 TABLET ORAL at 18:14

## 2018-12-07 RX ADMIN — SODIUM CHLORIDE, POTASSIUM CHLORIDE, SODIUM LACTATE AND CALCIUM CHLORIDE 500 ML/HR: 600; 310; 30; 20 INJECTION, SOLUTION INTRAVENOUS at 12:03

## 2018-12-07 RX ADMIN — NIFEDIPINE 30 MG: 30 TABLET, FILM COATED, EXTENDED RELEASE ORAL at 09:30

## 2018-12-07 RX ADMIN — OXYTOCIN 125 ML/HR: 10 INJECTION, SOLUTION INTRAMUSCULAR; INTRAVENOUS at 19:10

## 2018-12-07 RX ADMIN — EPHEDRINE SULFATE 5 MG: 50 INJECTION, SOLUTION INTRAVENOUS at 12:53

## 2018-12-07 RX ADMIN — IBUPROFEN 800 MG: 800 TABLET ORAL at 22:05

## 2018-12-07 RX ADMIN — SODIUM CHLORIDE, POTASSIUM CHLORIDE, SODIUM LACTATE AND CALCIUM CHLORIDE 999 ML/HR: 600; 310; 30; 20 INJECTION, SOLUTION INTRAVENOUS at 11:04

## 2018-12-07 RX ADMIN — EPHEDRINE SULFATE 5 MG: 50 INJECTION, SOLUTION INTRAVENOUS at 12:29

## 2018-12-07 RX ADMIN — Medication 10 ML/HR: at 11:55

## 2018-12-07 RX ADMIN — ACETAMINOPHEN 1000 MG: 500 TABLET, FILM COATED ORAL at 09:10

## 2018-12-07 RX ADMIN — SODIUM CHLORIDE, POTASSIUM CHLORIDE, SODIUM LACTATE AND CALCIUM CHLORIDE 125 ML/HR: 600; 310; 30; 20 INJECTION, SOLUTION INTRAVENOUS at 17:11

## 2018-12-07 RX ADMIN — OXYTOCIN 2 MILLI-UNITS/MIN: 10 INJECTION, SOLUTION INTRAMUSCULAR; INTRAVENOUS at 14:39

## 2018-12-07 NOTE — H&P
"[facility]  Obstetric History and Physical    Chief Complaint   Patient presents with   • Contractions     Pt arrives with c/o strong ctxs that started around 0500 with some blood tinged mucous        Subjective     Patient is a 18 y.o. female  currently at 37w1d, who presents with increasing strength of contractions starting around 0500.  Denies any vaginal bleeding but does report some bloody show.  Denies any LOF.  Denies headache/vision changes/RUQ pain.      This pregnancy has been complicated by chronic hypertension-on medication, chlamydia this pregnancy and most recent tomas 8.9.  Her previous obstetric/gynecological history is non-contributory.     Reports she transferred from [facility], [place]; had chlamydia and EIF with \"rest of testing negative.\"     Prenatal Information:  Prenatal Results     Initial Prenatal Labs     Test Value Reference Range Date Time    Hemoglobin        Hematocrit        Platelets 266 x10E3/uL 150 - 379 x10E3/uL 18 1512    Rubella IgG        Hepatitis B SAg        Hepatitis C Ab        RPR        ABO        Rh        Antibody Screen        HIV        Urine Culture >100,000 CFU/mL Mixed Patsy Isolated   18 1445    Gonorrhea Negative  Negative 18 1436    Chlamydia Negative  Negative 18 1436    TSH 1.120 mIU/mL 0.270 - 4.200 mIU/mL 17 1437          2nd and 3rd Trimester     Test Value Reference Range Date Time    Hemoglobin (repeated) 13.6 g/dL 11.1 - 15.9 g/dL 18 1512    Hematocrit (repeated) 38.7 % 34.0 - 46.6 % 18 1512    GCT        Antibody Screen (repeated)        GTT Fasting        GTT 1 Hr        GTT 2 Hr        GTT 3 Hr        Group B Strep Negative  Negative 18 1043          Drug Screening     Test Value Reference Range Date Time    Amphetamine Screen        Barbiturate Screen        Benzodiazepine Screen        Methadone Screen        Phencyclidine Screen        Opiates Screen        THC Screen        Cocaine Screen        " Propoxyphene Screen        Buprenorphine Screen        Methamphetamine Screen        Oxycodone Screen        Tryicyclic Antidepressants Screen              Other (Risk screening)     Test Value Reference Range Date Time    Varicella IgG        Parvovirus IgG        CMV IgG        Cystic Fibrosis        Hemoglobin electrophoresis        NIPT        MSAFP-4        AFP (for NTD only)                  External Prenatal Results     Pregnancy Outside Results - Transcribed From Office Records - See Scanned Records For Details     Test Value Date Time    Hgb 13.6 g/dL 18 1512    Hct 38.7 % 18 1512    ABO       Rh       Antibody Screen       Glucose Fasting GTT       Glucose Tolerance Test 1 hour       Glucose Tolerance Test 3 hour       Gonorrhea (discrete) Negative  18 1436    Chlamydia (discrete) Negative  18 1436    RPR       VDRL       Syphilis Antibody       Rubella       HBsAg       Herpes Simplex Virus PCR       Herpes Simplex VIrus Culture       HIV       Hep C RNA Quant PCR       Hep C Antibody       AFP       Group B Strep Negative  18 1043    GBS Susceptibility to Clindamycin       GBS Susceptibility to Erythromycin       Fetal Fibronectin       Genetic Testing, Maternal Blood             Drug Screening     Test Value Date Time    Urine Drug Screen       Amphetamine Screen       Barbiturate Screen Negative  17 0402    Benzodiazepine Screen Negative  17 0402    Methadone Screen Negative  17 0402    Phencyclidine Screen       Opiates Screen Negative  17 0402    THC Screen Positive  17 0402    Cocaine Screen       Propoxyphene Screen       Buprenorphine Screen       Methamphetamine Screen       Oxycodone Screen Negative  17 0402    Tricyclic Antidepressants Screen                    Past OB History:     Obstetric History       T0      L0     SAB1   TAB0   Ectopic0   Molar0   Multiple0   Live Births0       # Outcome Date GA Lbr Jaiden/2nd  "Weight Sex Delivery Anes PTL Lv   2 Current            1 SAB                   Past Medical History: Past Medical History:   Diagnosis Date   • ADHD (attention deficit hyperactivity disorder)    • Chlamydia     6/18   • Depression    • Hypertension    • Varicella       Past Surgical History Past Surgical History:   Procedure Laterality Date   • TONSILLECTOMY        Family History: Family History   Problem Relation Age of Onset   • Heart attack Father    • Diabetes Father    • Hypertension Father    • No Known Problems Mother    • Stroke Paternal Grandfather       Social History:  reports that she quit smoking about 8 months ago. Her smoking use included cigarettes. She smoked 0.10 packs per day. she has never used smokeless tobacco.   reports that she does not drink alcohol.   reports that she does not use drugs.        General ROS: Pertinent items are noted in HPI    Objective       Vital Signs Range for the last 24 hours  Temperature: Temp:  [99.1 °F (37.3 °C)] 99.1 °F (37.3 °C)   Temp Source: Temp src: Oral   BP: BP: (135-149)/(81-98) 135/98   Pulse: Heart Rate:  [81-84] 81   Respirations: Resp:  [16] 16   SPO2:     O2 Amount (l/min):     O2 Devices     Weight:     Vitals:    12/07/18 0801 12/07/18 0802 12/07/18 0900 12/07/18 1112   BP: 142/81  135/98 135/88   Pulse: 84  81 70   Resp:       Temp:       TempSrc:       Height:  170.2 cm (67\")           Physical Examination: General appearance - alert, well appearing, and in no distress  Mental status - alert, oriented to person, place, and time  Eyes - sclera anicteric, left eye normal, right eye normal  Chest - no tachypnea, retractions or cyanosis  Heart - normal rate and regular rhythm  Abdomen - soft, nontender, nondistended, no masses or organomegaly  Pelvic - see cervical exam  Back exam - full range of motion, no tenderness, palpable spasm or pain on motion  Neurological - alert, oriented, normal speech, no focal findings or movement disorder noted, no " clonus, 1+ reflexes bilaterally  Musculoskeletal - no joint tenderness, deformity or swelling  Extremities - no pedal edema noted  Skin - normal coloration and turgor, no rashes, no suspicious skin lesions noted    Presentation: Vertex   Cervix: Exam by: Method: sterile exam per RN   Dilation: Cervical Dilation (cm): 4-5   Effacement: Cervical Effacement: 60%   Station:         Fetal Heart Rate Assessment   Method: Fetal HR Assessment Method: external   Beats/min: Fetal HR (beats/min): 135   Baseline: Fetal Heart Baseline Rate: normal range   Varibility: Fetal HR Variability: moderate (amplitude range 6 to 25 bpm)   Accels: Fetal HR Accelerations: greater than/equal to 15 bpm, lasting at least 15 seconds   Decels: Fetal HR Decelerations: absent   Tracing Category:       Uterine Assessment   Method: Method: external tocotransducer   Frequency (min): Contraction Frequency (Minutes): 5-7   Ctx Count in 10 min:     Duration:     Intensity: Contraction Intensity: mild by palpation   Intensity by IUPC:     Resting Tone: Uterine Resting Tone: soft by palpation   Resting Tone by IUPC:     Steele Units:       Laboratory Results:   Lab Results   Component Value Date    WBC 8.74 11/28/2018    HGB 13.6 12/05/2018    HCT 38.7 12/05/2018    MCV 83 12/05/2018     12/05/2018      Lab Results   Component Value Date    GLUCOSE 90 11/28/2018    BUN 7 12/05/2018    CREATININE 0.70 12/05/2018    EGFRIFNONA 125 12/05/2018    EGFRIFAFRI 144 12/05/2018    BCR 10 12/05/2018    K 4.3 12/05/2018    CO2 20 12/05/2018    CALCIUM 9.4 12/05/2018    PROTENTOTREF 6.8 12/05/2018    ALBUMIN 3.9 12/05/2018    LABIL2 1.3 12/05/2018    AST 16 12/05/2018    ALT 15 12/05/2018      Assessment/Plan       Pregnancy  Chronic hypertension, on medication  Labor      Assessment:  1.  Intrauterine pregnancy at 37w1d weeks gestation with reactive fetal status.    2.  Chronic hypertension, on Procardia  3.  GBS status: Negative   4. H/o chlamydia      Plan:  1. BP mostly normal to mild range with an isolated severe,but mild on repeat.  Has no symptoms of severe disease.  Awaiting today's labs.  24 hour urine 12/5 was normal.  Will continue to monitor closely and administer magnesium for any sign of severe disease.  2. Labor with cervical change.  Discussed with patient will re-check cervix with expectant management for now.  Augmentation is reasonable as necessary as per ACOG chronic hypertension on medication can be delivered 37w or beyond.    2. Plan of care has been reviewed with patient.  Risks, benefits of treatment plan have been discussed.  All questions have been answered.      Lana Michaud MD  12/7/2018  11:15 AM

## 2018-12-07 NOTE — ANESTHESIA PROCEDURE NOTES
Labor Epidural    Pre-sedation assessment completed: 12/7/2018 11:47 AM    Patient reassessed immediately prior to procedure    Patient location during procedure: OB  Start Time: 12/7/2018 11:48 AM  Stop Time: 12/7/2018 11:50 AM  Performed By  Anesthesiologist: Sukumar Montejo MD  Preanesthetic Checklist  Completed: patient identified, site marked, surgical consent, pre-op evaluation, timeout performed, IV checked, risks and benefits discussed and monitors and equipment checked  Additional Notes  Labor epidural using Arrow kit, no heme/CSF aspirated, no paraesthesias.  Test dose with 3cc 1.5% lido with epi is negative.    Prep:  Pt Position:sitting  Sterile Tech:cap, gloves, mask and sterile barrier  Prep:chlorhexidine gluconate and isopropyl alcohol  Monitoring:blood pressure monitoring, continuous pulse oximetry and EKG  Epidural Block Procedure:  Approach:midline  Guidance:landmark technique and palpation technique  Location:L3-L4  Needle Type:Tuohy  Needle Gauge:17  Loss of Resistance Medium: saline  Loss of Resistance: 5cm  Cath Depth at skin:10 cm  Paresthesia: none  Aspiration:negative  Test Dose:negative  Number of Attempts: 1  Post Assessment:  Dressing:occlusive dressing applied and secured with tape  Pt Tolerance:patient tolerated the procedure well with no apparent complications  Complications:no

## 2018-12-07 NOTE — ANESTHESIA PREPROCEDURE EVALUATION
Anesthesia Evaluation     Patient summary reviewed and Nursing notes reviewed   no history of anesthetic complications:  NPO Solid Status: > 8 hours  NPO Liquid Status: > 2 hours           Airway   Mallampati: II  TM distance: >3 FB  Neck ROM: full  no difficulty expected  Dental - normal exam     Pulmonary - normal exam   (+) a smoker Former,   (-) COPD, asthma, shortness of breath, lung cancer  Cardiovascular - normal exam  Exercise tolerance: good (4-7 METS)    Rhythm: regular  Rate: normal    (+) hypertension well controlled less than 2 medications,   (-) valvular problems/murmurs, past MI, CAD, dysrhythmias, angina, cardiac stents      Neuro/Psych  (+) dizziness/light headedness, psychiatric history Anxiety and ADHD,     (-) seizures, TIA, CVA  GI/Hepatic/Renal/Endo - negative ROS   (-) hepatitis, liver disease, no renal disease, diabetes, hypothyroidism, GI bleed    Musculoskeletal (-) negative ROS    Abdominal  - normal exam   Substance History - negative use     OB/GYN    (+) Pregnant, pregnancy induced hypertension        Other - negative ROS                     Anesthesia Plan    ASA 2     epidural     Anesthetic plan, all risks, benefits, and alternatives have been provided, discussed and informed consent has been obtained with: patient.    Plan discussed with attending.

## 2018-12-07 NOTE — L&D DELIVERY NOTE
[facility]  Vaginal Delivery Note    Delivery    Predelivery Diagnoses: 1) Pregnancy at 37w1d                                          2) chronic hypertension                     Postdelivery Diagnoses 1) Pregnancy at 37w1d                                            2) chronic hypertension     Attending :  Chaparro Doyle MD     Procedure : Obstetrical controlled vaginal delivery    Delivery Narrative :     Debbie Bauman is a 18 y.o. year old  @ 37w1d estimated gestational age. She presented to L&D for labor with cervical change.   Her prenatal care was with Dr. Esteves and was complicated by chronic hypertension well controlled with Procardia XL.   Once on L&D her labor progressed well along the labor curve with the aid epidural, pitocin and amniotomy interventions.  Once she was to the second stage, I was called for delivery.     Upon my arrival she was prepped in the lithotomy position, and was doing well in her pushing efforts.  With delivery of the fetal head in OA presentation, bulb suction was performed, then using a gonzales type maneuver, pressure was placed along the posterior aspect of the anterior shoulder and it delivered without difficulty. No nuchal cord noted.  The infant showed good cry and tone and was placed upon the Mother's abdomen.   After a thirty second delay, I then clamped the cord and it was cut by me.    Care of the infant was then turned over to the delivery team.  Cord blood was obtained and then using gentle pressure the placenta was delivered spontaneous/intact and noted to have 3 vessel cord.  At that point I undertook inspection of the perineum and vulva and I repaired left labial laceration using 3-0 Monocryl in standard fashion with good hemostatic and cosmetic results.       After repair of all lacerations, the area was noted to be hemostatic and all sponge and needle counts were correct. A vaginal exam and rectal exam showed no issues with retained equipment or  suture in an abnormal place.      Several clots noted post repair, ordered 800 mcg of misoprostol for improved uterine tone.     There was one family member(s) noted to be in the room with this patient.            Delivery: Vaginal, Spontaneous     YOB: 2020    Time of Birth: 5:55 PM      Anesthesia: Epidural             EBL: 200 cc          Infant    Findings: male  infant     Infant observations: Weight: No birth weight on file.   Length:    in  Observations/Comments:  Scale 2      Apgars: 9   @ 1 minute /    9   @ 5 minutes       Complications  none    Disposition  Mother to Mother Baby/Postpartum  in stable condition currently.  Baby to remains with mom  in stable condition currently.      Chaparro Doyle MD  12/07/18  6:15 PM

## 2018-12-07 NOTE — PROGRESS NOTES
Late entry from around 16:00    AROM - bloody  Attempted IUPC x 2 - return of bloody fluid so discontinued.     Cx 5/90/-1   Tolerated well  FSR     Chaparro Doyle MD  12/7/2018  5:29 PM

## 2018-12-07 NOTE — PLAN OF CARE
Problem: Patient Care Overview  Goal: Plan of Care Review  Outcome: Ongoing (interventions implemented as appropriate)   12/07/18 1854   Coping/Psychosocial   Plan of Care Reviewed With patient   Plan of Care Review   Progress improving     Goal: Individualization and Mutuality   12/07/18 1854   Individualization   Patient Specific Preferences frank, breastfeeding, dad unsure of cutting cord   Mutuality/Individual Preferences   What Anxieties, Fears, Concerns, or Questions Do You Have About Your Care? pain     Goal: Discharge Needs Assessment  Outcome: Ongoing (interventions implemented as appropriate)   12/07/18 1854   Discharge Needs Assessment   Readmission Within the Last 30 Days no previous admission in last 30 days   Concerns to be Addressed no discharge needs identified   Patient/Family Anticipates Transition to home with family   Transportation Concerns car, none   Transportation Anticipated car, drives self   Anticipated Changes Related to Illness none   Disability   Equipment Currently Used at Home none       Problem: Labor (Cervical Ripen, Induct, Augment) (Adult,Obstetrics,Pediatric)  Goal: Signs and Symptoms of Listed Potential Problems Will be Absent, Minimized or Managed (Labor)  Outcome: Ongoing (interventions implemented as appropriate)   12/07/18 1854   Goal/Outcome Evaluation   Problems Assessed (Labor) all   Problems Present (Labor) none          Magnolia Aly

## 2018-12-08 LAB
BASOPHILS # BLD AUTO: 0.03 10*3/MM3 (ref 0–0.2)
BASOPHILS NFR BLD AUTO: 0.1 % (ref 0–1.5)
DEPRECATED RDW RBC AUTO: 41.3 FL (ref 37–54)
EOSINOPHIL # BLD AUTO: 0.09 10*3/MM3 (ref 0–0.7)
EOSINOPHIL NFR BLD AUTO: 0.4 % (ref 0.3–6.2)
ERYTHROCYTE [DISTWIDTH] IN BLOOD BY AUTOMATED COUNT: 13.3 % (ref 11.7–13)
HCT VFR BLD AUTO: 36.9 % (ref 35.6–45.5)
HGB BLD-MCNC: 12.4 G/DL (ref 11.9–15.5)
IMM GRANULOCYTES # BLD: 0.09 10*3/MM3 (ref 0–0.03)
IMM GRANULOCYTES NFR BLD: 0.4 % (ref 0–0.5)
LYMPHOCYTES # BLD AUTO: 3.01 10*3/MM3 (ref 0.9–4.8)
LYMPHOCYTES NFR BLD AUTO: 13.6 % (ref 19.6–45.3)
MCH RBC QN AUTO: 28.8 PG (ref 26.9–32)
MCHC RBC AUTO-ENTMCNC: 33.6 G/DL (ref 32.4–36.3)
MCV RBC AUTO: 85.6 FL (ref 80.5–98.2)
MONOCYTES # BLD AUTO: 1.78 10*3/MM3 (ref 0.2–1.2)
MONOCYTES NFR BLD AUTO: 8 % (ref 5–12)
NEUTROPHILS # BLD AUTO: 17.28 10*3/MM3 (ref 1.9–8.1)
NEUTROPHILS NFR BLD AUTO: 77.9 % (ref 42.7–76)
PLATELET # BLD AUTO: 215 10*3/MM3 (ref 140–500)
PMV BLD AUTO: 11.3 FL (ref 6–12)
RBC # BLD AUTO: 4.31 10*6/MM3 (ref 3.9–5.2)
RPR SER QL: NORMAL
WBC NRBC COR # BLD: 22.19 10*3/MM3 (ref 4.5–10.7)

## 2018-12-08 PROCEDURE — 85025 COMPLETE CBC W/AUTO DIFF WBC: CPT | Performed by: OBSTETRICS & GYNECOLOGY

## 2018-12-08 PROCEDURE — 99024 POSTOP FOLLOW-UP VISIT: CPT | Performed by: OBSTETRICS & GYNECOLOGY

## 2018-12-08 RX ADMIN — NIFEDIPINE 60 MG: 60 TABLET, FILM COATED, EXTENDED RELEASE ORAL at 10:26

## 2018-12-08 RX ADMIN — Medication 1 TABLET: at 10:27

## 2018-12-08 RX ADMIN — IBUPROFEN 800 MG: 800 TABLET ORAL at 19:52

## 2018-12-08 RX ADMIN — DOCUSATE SODIUM 100 MG: 100 CAPSULE, LIQUID FILLED ORAL at 10:27

## 2018-12-08 RX ADMIN — IBUPROFEN 800 MG: 800 TABLET ORAL at 12:17

## 2018-12-08 NOTE — LACTATION NOTE
Started patient on HGP with 24 mm flange. 5 cc of bloody colostrum obtained from left breast and none from right which expressly easily by hand. Attempted to latch which baby did but then would not suckle.

## 2018-12-08 NOTE — PLAN OF CARE
Problem: Patient Care Overview  Goal: Plan of Care Review  Outcome: Ongoing (interventions implemented as appropriate)   12/08/18 0400   Coping/Psychosocial   Plan of Care Reviewed With patient   Plan of Care Review   Progress improving   OTHER   Outcome Summary learning to breastfeed, pain well controlled, increased temperature with checks earlier in night, afebrile now, resting in between feedings       Problem: Postpartum (Vaginal Delivery) (Adult,Obstetrics,Pediatric)  Goal: Signs and Symptoms of Listed Potential Problems Will be Absent, Minimized or Managed (Postpartum)  Outcome: Ongoing (interventions implemented as appropriate)   12/08/18 0400   Goal/Outcome Evaluation   Problems Assessed (Postpartum Vaginal Delivery) all   Problems Present (Postpartum Vag Deliv) none

## 2018-12-08 NOTE — LACTATION NOTE
P1. Sleepy baby boy 37w1d. In JOSE ANGEL after RN assisted with latch attempt. Taught patient RPS and colostrum pours freely. Given plastic spoon to feed EBM.    Lactation Consult Note    Evaluation Completed: 2018 1:57 PM  Patient Name: Debbie Bauman  :  2000  MRN:  [id number]     REFERRAL  INFORMATION:                          Date of Referral: 18   Person Making Referral: nurse  Maternal Reason for Referral: breastfeeding currently  Infant Reason for Referral: 35-37 weeks gestation    DELIVERY HISTORY:          Skin to skin initiation date/time: 2018  5:56 PM   Skin to skin end date/time:              MATERNAL ASSESSMENT:  Breast Size Issue: none (18 1351 : Sarah Vela RN)  Breast Shape: pendulous (18 1351 : Sarah Vela, RN)  Breast Density: soft (18 [facility] : Sarah Vela, RN)  Areola: elastic (18 [facility] : Sarah Vela RN)  Nipples: everted, graspable (18 1351 : Sarah Vela, RN)                INFANT ASSESSMENT:  Information for the patient's :  Abe Bauman [[id number]]   No past medical history on file.                                                                                                                                MATERNAL INFANT FEEDING:  Maternal Preparation: breast care (18 1351 : Sarah Vela RN)  Maternal Emotional State: assist needed (18 [facility] : Sarah Vela RN)  Infant Positioning: laid back (ventral) (18 1351 : Sarah Vela, RN)                  Milk Ejection Reflex: present (18 1351 : Sarah Vela, RN)  Comfort Measures Following Feeding: air-drying encouraged, expressed milk applied, water cleansing encouraged (18 1351 : Sarah Vela, RN)        Latch Assistance: yes (18 1351 : Sarah Vela, RN)                               EQUIPMENT TYPE:                                 BREAST PUMPING:          LACTATION  REFERRALS:

## 2018-12-08 NOTE — PROGRESS NOTES
Vital:   Vitals:    12/08/18 0730   BP: 122/78   Pulse: 84   Resp: 20   Temp: 97.9 °F (36.6 °C)   SpO2:     CC Postpartum rounds  Without complaints   Lochia Scant   Episiotomy: Suture healing well   Hemoglobin:      Recent Results (from the past 24 hour(s))   CBC (No Diff)    Collection Time: 12/07/18 11:02 AM   Result Value Ref Range    WBC 21.22 (H) 4.50 - 10.70 10*3/mm3    RBC 4.61 3.90 - 5.20 10*6/mm3    Hemoglobin 13.4 11.9 - 15.5 g/dL    Hematocrit 40.9 35.6 - 45.5 %    MCV 88.7 80.5 - 98.2 fL    MCH 29.1 26.9 - 32.0 pg    MCHC 32.8 32.4 - 36.3 g/dL    RDW 13.1 (H) 11.7 - 13.0 %    RDW-SD 41.8 37.0 - 54.0 fl    MPV 10.6 6.0 - 12.0 fL    Platelets 245 140 - 500 10*3/mm3   Type & Screen    Collection Time: 12/07/18 11:02 AM   Result Value Ref Range    ABO Type O     RH type Positive     Antibody Screen Negative     T&S Expiration Date 12/10/2018 11:59:59 PM    Comprehensive Metabolic Panel    Collection Time: 12/07/18 11:02 AM   Result Value Ref Range    Glucose 90 65 - 99 mg/dL    BUN 7 6 - 20 mg/dL    Creatinine 0.60 0.57 - 1.00 mg/dL    Sodium 138 136 - 145 mmol/L    Potassium 4.2 3.5 - 5.2 mmol/L    Chloride 102 98 - 107 mmol/L    CO2 22.9 22.0 - 29.0 mmol/L    Calcium 9.9 8.6 - 10.5 mg/dL    Total Protein 7.5 6.0 - 8.5 g/dL    Albumin 3.80 3.50 - 5.20 g/dL    ALT (SGPT) 13 1 - 33 U/L    AST (SGOT) 14 1 - 32 U/L    Alkaline Phosphatase 229 (H) 39 - 117 U/L    Total Bilirubin 0.3 0.1 - 1.2 mg/dL    eGFR Non African Amer 127 >60 mL/min/1.73    Globulin 3.7 gm/dL    A/G Ratio 1.0 g/dL    BUN/Creatinine Ratio 11.7 7.0 - 25.0    Anion Gap 13.1 mmol/L   Hepatitis B Surface Antigen    Collection Time: 12/07/18  2:23 PM   Result Value Ref Range    Hepatitis B Surface Ag Non-Reactive Non-Reactive   HIV-1 / O / 2 Ag / Antibody 4th Generation    Collection Time: 12/07/18  2:23 PM   Result Value Ref Range    HIV-1/ HIV-2 Non-Reactive Non-Reactive    HIV-1 P24 Ag Screen Non-Reactive Non-Reactive   Hepatitis C Antibody     Collection Time: 12/07/18  2:23 PM   Result Value Ref Range    Hepatitis C Ab Non-Reactive Non-Reactive   Urine Drug Screen - Urine, Clean Catch    Collection Time: 12/07/18  6:46 PM   Result Value Ref Range    Amphet/Methamphet, Screen Negative Negative    Barbiturates Screen, Urine Negative Negative    Benzodiazepine Screen, Urine Negative Negative    Cocaine Screen, Urine Negative Negative    Opiate Screen Negative Negative    THC, Screen, Urine Negative Negative    Methadone Screen, Urine Negative Negative    Oxycodone Screen, Urine Negative Negative   CBC Auto Differential    Collection Time: 12/08/18  6:01 AM   Result Value Ref Range    WBC 22.19 (H) 4.50 - 10.70 10*3/mm3    RBC 4.31 3.90 - 5.20 10*6/mm3    Hemoglobin 12.4 11.9 - 15.5 g/dL    Hematocrit 36.9 35.6 - 45.5 %    MCV 85.6 80.5 - 98.2 fL    MCH 28.8 26.9 - 32.0 pg    MCHC 33.6 32.4 - 36.3 g/dL    RDW 13.3 (H) 11.7 - 13.0 %    RDW-SD 41.3 37.0 - 54.0 fl    MPV 11.3 6.0 - 12.0 fL    Platelets 215 140 - 500 10*3/mm3    Neutrophil % 77.9 (H) 42.7 - 76.0 %    Lymphocyte % 13.6 (L) 19.6 - 45.3 %    Monocyte % 8.0 5.0 - 12.0 %    Eosinophil % 0.4 0.3 - 6.2 %    Basophil % 0.1 0.0 - 1.5 %    Immature Grans % 0.4 0.0 - 0.5 %    Neutrophils, Absolute 17.28 (H) 1.90 - 8.10 10*3/mm3    Lymphocytes, Absolute 3.01 0.90 - 4.80 10*3/mm3    Monocytes, Absolute 1.78 (H) 0.20 - 1.20 10*3/mm3    Eosinophils, Absolute 0.09 0.00 - 0.70 10*3/mm3    Basophils, Absolute 0.03 0.00 - 0.20 10*3/mm3    Immature Grans, Absolute 0.09 (H) 0.00 - 0.03 10*3/mm3         Blood type: O +    Rubella: Pending   Impression  1.  Postpartum day #1-vaginal delivery   doing well    Plan  1.  Circumcision today as discussed with mother. Probable  discharge tomorrow.

## 2018-12-09 VITALS
SYSTOLIC BLOOD PRESSURE: 118 MMHG | BODY MASS INDEX: 35.87 KG/M2 | RESPIRATION RATE: 18 BRPM | DIASTOLIC BLOOD PRESSURE: 64 MMHG | TEMPERATURE: 98.1 F | OXYGEN SATURATION: 96 % | HEART RATE: 65 BPM | HEIGHT: 67 IN

## 2018-12-09 PROCEDURE — 99024 POSTOP FOLLOW-UP VISIT: CPT | Performed by: OBSTETRICS & GYNECOLOGY

## 2018-12-09 RX ORDER — HYDROCODONE BITARTRATE AND ACETAMINOPHEN 5; 325 MG/1; MG/1
2 TABLET ORAL EVERY 6 HOURS PRN
Qty: 18 TABLET | Refills: 0 | Status: SHIPPED | OUTPATIENT
Start: 2018-12-09 | End: 2018-12-09

## 2018-12-09 RX ORDER — IBUPROFEN 800 MG/1
800 TABLET ORAL EVERY 8 HOURS PRN
Qty: 50 TABLET | Refills: 2 | Status: SHIPPED | OUTPATIENT
Start: 2018-12-09 | End: 2019-01-22

## 2018-12-09 RX ORDER — HYDROCODONE BITARTRATE AND ACETAMINOPHEN 5; 325 MG/1; MG/1
2 TABLET ORAL EVERY 6 HOURS PRN
Qty: 18 TABLET | Refills: 0 | Status: SHIPPED | OUTPATIENT
Start: 2018-12-09 | End: 2018-12-12

## 2018-12-09 NOTE — DISCHARGE SUMMARY
Date of Discharge:  2018    Discharge Diagnosis:   Patient Active Problem List   Diagnosis   • Inattention   • Pain in radius   • Family history of diabetes mellitus   • Increased thirst   • Menstrual cycle problem   • Essential hypertension   • Heart palpitations   • Dizziness   • Menorrhagia with irregular cycle   • Chronic hypertension in pregnancy   • Pregnancy   • Pregnant   • Normal vaginal delivery       Presenting Problem/History of Present Illness  Pregnancy [Z34.90]  Pregnancy [Z34.90]       Hospital Course  Patient is a 18 y.o. female  37w1d presented with labor.  For events surrounding her delivery please see delivery/op note.  Her postpartum course was uneventful and today PPD#2, she is ready for discharge.  She meets all milestones and criteria for discharge and instructions were reviewed and she voiced understanding.     Procedures Performed  Vaginal delivery        Consults:   Consults     No orders found from 2018 to 2018.          Pertinent Test Results: None     Condition on Discharge:  Stable     Discharge Disposition  Home or Self Care    Discharge Medications     Discharge Medications      New Medications      Instructions Start Date   HYDROcodone-acetaminophen 5-325 MG per tablet  Commonly known as:  NORCO   2 tablets, Oral, Every 6 Hours PRN      ibuprofen 800 MG tablet  Commonly known as:  ADVIL,MOTRIN   800 mg, Oral, Every 8 Hours PRN         Continue These Medications      Instructions Start Date   famotidine 10 MG tablet  Commonly known as:  PEPCID   10 mg, Oral, 2 Times Daily      ferrous sulfate 325 (65 FE) MG tablet   325 mg, Oral, Daily With Breakfast      NIFEdipine XL 30 MG 24 hr tablet  Commonly known as:  PROCARDIA XL   60 mg, Oral, Daily      Omega-3 1000 MG capsule   Oral      prenatal (CLASSIC) vitamin 28-0.8 MG tablet tablet   Oral, Daily         Stop These Medications    aspirin 81 MG chewable tablet            Discharge Diet:   Diet Instructions      Advance Diet As Tolerated            Activity at Discharge:   Activity Instructions     Pelvic Rest            Follow-up Appointments  No future appointments.  Additional Instructions for the Follow-ups that You Need to Schedule     Discharge Follow-up with Specialty: Dr. Esteves; 6 Weeks   As directed      Specialty:  Dr. Esteves    Follow Up:  6 Weeks               Test Results Pending at Discharge   Order Current Status    OB Panel With HIV In process    Rubella Antibody, IgG In process           Chaparro Doyle MD  12/09/18  10:32 AM

## 2018-12-09 NOTE — PROGRESS NOTES
Postpartum Progress Note      Status post Vaginal Delivery: Doing well postoperatively.     1) postpartum care immediately following delivery : Doing well, discharge home   2) Chronic HTN, stable BP     Rh status: O positive   Rubella: pending   Gender: Male     Subjective     Postpartum Day 2: Vaginal delivery    The patient feels well. The patient denies emotional concerns. Pain is well controlled with current medications. The baby is well. The patient is ambulating well. The patient is tolerating a normal diet.     Objective     Vital signs in last 24 hours:  Temp:  [97.7 °F (36.5 °C)-98.1 °F (36.7 °C)] 98.1 °F (36.7 °C)  Heart Rate:  [63-87] 65  Resp:  [16-18] 18  BP: (116-118)/(64-75) 118/64      General:    alert, appears stated age and cooperative   Abdomen:  Soft, Non-tender    Lochia:  appropriate   Uterine Fundus:   firm   Ext    Edema 1+   DVT Evaluation:  No evidence of DVT seen on physical exam.     Lab Results   Component Value Date    WBC 22.19 (H) 12/08/2018    HGB 12.4 12/08/2018    HCT 36.9 12/08/2018    MCV 85.6 12/08/2018     12/08/2018       Chaparro Doyle MD  12/9/2018  10:25 AM

## 2018-12-11 ENCOUNTER — TELEPHONE (OUTPATIENT)
Dept: OBSTETRICS AND GYNECOLOGY | Facility: CLINIC | Age: 18
End: 2018-12-11

## 2018-12-11 LAB — RUBV IGG SERPL IA-ACNC: POSITIVE

## 2018-12-11 NOTE — TELEPHONE ENCOUNTER
Pt aware.12-11-18/lw  ----- Message from Reinaldo Rubi MD sent at 12/6/2018  2:07 PM EST -----  Let the patient know that her blood work was normal and her urine protein was not consistent with preeclampsia

## 2018-12-13 ENCOUNTER — TELEPHONE (OUTPATIENT)
Dept: LACTATION | Facility: HOSPITAL | Age: 18
End: 2018-12-13

## 2018-12-13 NOTE — TELEPHONE ENCOUNTER
D/c phone call. Pt is pumping after nursing and feeding all ebm to baby d/t baby's weight loss. [facility]

## 2019-01-02 ENCOUNTER — TELEPHONE (OUTPATIENT)
Dept: OBSTETRICS AND GYNECOLOGY | Facility: CLINIC | Age: 19
End: 2019-01-02

## 2019-01-02 NOTE — TELEPHONE ENCOUNTER
Neela    Let her know that she should see the counselor as soon as they can get her an appointment.  Seeing me first is ok, but if a patient can see a counselor, it is always beneficial.  You can work her in my schedule for postpartum exam with any available slot within the next week.    Thanks    Danielito          Received call from  from [facility]. She stated patient scored 19 on her [place] test. She has suggested a counselor to talk to but patient wants to see Dr shell. She has her 6 wk PP appt on 01/18/2019. Does patient need to be worked in sooner?    Thank you

## 2019-01-02 NOTE — TELEPHONE ENCOUNTER
Called patient and scheduled an appt for 01/09. She does not want to see a counselor.     Thank you

## 2019-01-09 ENCOUNTER — POSTPARTUM VISIT (OUTPATIENT)
Dept: OBSTETRICS AND GYNECOLOGY | Facility: CLINIC | Age: 19
End: 2019-01-09

## 2019-01-09 VITALS
SYSTOLIC BLOOD PRESSURE: 132 MMHG | BODY MASS INDEX: 34.53 KG/M2 | HEIGHT: 67 IN | WEIGHT: 220 LBS | DIASTOLIC BLOOD PRESSURE: 80 MMHG

## 2019-01-09 PROBLEM — Z34.90 PREGNANT: Status: RESOLVED | Noted: 2018-11-23 | Resolved: 2019-01-09

## 2019-01-09 PROBLEM — Z34.90 PREGNANCY: Status: RESOLVED | Noted: 2018-11-06 | Resolved: 2019-01-09

## 2019-01-09 PROCEDURE — 99213 OFFICE O/P EST LOW 20 MIN: CPT | Performed by: OBSTETRICS & GYNECOLOGY

## 2019-01-09 RX ORDER — PAROXETINE HYDROCHLORIDE 20 MG/1
20 TABLET, FILM COATED ORAL EVERY MORNING
Qty: 30 TABLET | Refills: 5 | Status: SHIPPED | OUTPATIENT
Start: 2019-01-09 | End: 2019-01-22

## 2019-01-09 NOTE — PROGRESS NOTES
Subjective   Debbie Bauman is a 18 y.o. female.     Cc:  Mood postpartum    History of Present Illness - 18 year old female with hypersomnia, crying, irritability, anorexia - all occurring postpartum.  Patient states that she had anxiety or mood issues in past and saw a counselor.  She is not on medications.  She denies suicidal/homicidal ideation.  She has not been evaluated until today.  Symptoms are not improving.    The following portions of the patient's history were reviewed and updated as appropriate:   She  has a past medical history of ADHD (attention deficit hyperactivity disorder), Chlamydia, Depression, Hypertension, and Varicella.  She  reports that she quit smoking about 9 months ago. Her smoking use included cigarettes. She smoked 0.10 packs per day. she has never used smokeless tobacco. She reports that she does not drink alcohol or use drugs.  Current Outpatient Medications   Medication Sig Dispense Refill   • famotidine (PEPCID) 10 MG tablet Take 10 mg by mouth 2 (Two) Times a Day.     • NIFEdipine XL (PROCARDIA XL) 30 MG 24 hr tablet Take 2 tablets by mouth Daily. 14 tablet 1   • Omega-3 1000 MG capsule Take  by mouth.     • Prenatal Vit-Fe Fumarate-FA (PRENATAL, CLASSIC, VITAMIN) 28-0.8 MG tablet tablet Take  by mouth Daily.     • ferrous sulfate 325 (65 FE) MG tablet Take 325 mg by mouth Daily With Breakfast.     • ibuprofen (ADVIL,MOTRIN) 800 MG tablet Take 1 tablet by mouth Every 8 (Eight) Hours As Needed for Mild Pain . 50 tablet 2   • PARoxetine (PAXIL) 20 MG tablet Take 1 tablet by mouth Every Morning. 30 tablet 5     No current facility-administered medications for this visit.      She is allergic to bee venom..    Review of Systems   Psychiatric/Behavioral: Positive for decreased concentration, dysphoric mood and sleep disturbance.       Objective   Physical Exam   Neurological: She is alert. Coordination normal.   Skin: Skin is warm and dry.   Psychiatric: She has a normal mood and  affect. Her behavior is normal. Judgment and thought content normal.   Vitals reviewed.      Assessment/Plan   Debbie was seen today for postpartum care.    Diagnoses and all orders for this visit:    Postpartum depression  -     PARoxetine (PAXIL) 20 MG tablet; Take 1 tablet by mouth Every Morning.  -     I recommended starting SSRIs.  Patient is unsure if she will be able to pursue counseling; however, she seemed non-committal to medications.  Explained importance of medication in treatment of postpartum depression.  Will have patient follow up in 2 to 3 weeks to see how she is doing.    I spent 20 minutes with patient and nearly the entire time in counseling on depression.    Anthony Esteves MD

## 2019-01-16 ENCOUNTER — TELEPHONE (OUTPATIENT)
Dept: OBSTETRICS AND GYNECOLOGY | Facility: CLINIC | Age: 19
End: 2019-01-16

## 2019-01-16 NOTE — TELEPHONE ENCOUNTER
"Sarah    I'm ok with her stopping Paxil.  If she feels she has sign of depression, she should let me know.      Thanks    Danielito Esteves,    Patient called stating she is wanting to \"Just stop\" taking Paxil that she just started on last week for 2 reasons 1. Her stepmother was on it before and took a long time to come off of it and out of depression and 2. She noticed blurry vision today. She also stated she doesn't want to be on anyting  "

## 2019-01-22 ENCOUNTER — POSTPARTUM VISIT (OUTPATIENT)
Dept: OBSTETRICS AND GYNECOLOGY | Facility: CLINIC | Age: 19
End: 2019-01-22

## 2019-01-22 VITALS
DIASTOLIC BLOOD PRESSURE: 80 MMHG | SYSTOLIC BLOOD PRESSURE: 124 MMHG | BODY MASS INDEX: 35 KG/M2 | HEIGHT: 67 IN | WEIGHT: 223 LBS

## 2019-01-22 PROCEDURE — 0503F POSTPARTUM CARE VISIT: CPT | Performed by: OBSTETRICS & GYNECOLOGY

## 2019-01-22 NOTE — PROGRESS NOTES
"Subjective      Cc:  Postpartum    Debbie Bauman is a 18 y.o. female who presents for a postpartum visit. She is 6 weeks postpartum following a spontaneous vaginal delivery. I have fully reviewed the prenatal and intrapartum course. The delivery was at 37 gestational weeks. Outcome: spontaneous vaginal delivery. Anesthesia: regional. Postpartum course has been uncomplicated. Baby's course has been uncomplicated. Baby is feeding by breast feeding - patient had a \"clogged\" duct that recently released spontaneously.  No evidence of mastitis. Bleeding is occurring with likely menses. Bowel function is normal. Bladder function is normal. Patient is not sexually active. Contraception method is desired as Mirenat IUD. Postpartum depression screening: patient had the \"blues\" and tried SSRI but stopped and is now feeling improved.    The following portions of the patient's history were reviewed and updated as appropriate:   She  has a past medical history of ADHD (attention deficit hyperactivity disorder), Chlamydia, Depression, Hypertension, and Varicella.  She  reports that she quit smoking about 9 months ago. Her smoking use included cigarettes. She smoked 0.10 packs per day. she has never used smokeless tobacco. She reports that she does not drink alcohol or use drugs.  Current Outpatient Medications   Medication Sig Dispense Refill   • Prenatal Vit-Fe Fumarate-FA (PRENATAL, CLASSIC, VITAMIN) 28-0.8 MG tablet tablet Take  by mouth Daily.       No current facility-administered medications for this visit.      She is allergic to bee venom..    Review of Systems  Constitutional: negative for chills and fevers   Gastrointestinal:  Negative for nausea, vomiting.  Positive for constipation  :  Negative for dysuria and frequency.    Objective   /80   Ht 170.2 cm (67.01\")   Wt 101 kg (223 lb)   LMP 01/17/2019   Breastfeeding? Yes   BMI 34.92 kg/m²    General:  alert, appears stated age and cooperative    " Breasts:  inspection negative, no nipple discharge or bleeding, no masses or nodularity palpable   Lungs: clear to auscultation bilaterally   Heart:  regular rate and rhythm   Abdomen: normal findings: no masses palpable and soft, non-tender    Vulva:  normal   Vagina: normal vagina, no discharge, exudate, lesion, or erythema   Cervix:  no cervical motion tenderness   Corpus: normal size, contour, position, consistency, mobility, non-tender   Adnexa:  no mass, fullness, tenderness   Rectal Exam: Not performed.     Assessment/Plan   Normal postpartum exam. Pap smear not done at today's visit.    1. Contraception: IUD.  Discussed the duration of contraception and insertion.  2. Patient released from postpartum restrictions.  3. Follow up in: 2 weeks or as needed for IUD check.    Anthony Esteves MD

## 2019-02-07 ENCOUNTER — PROCEDURE VISIT (OUTPATIENT)
Dept: OBSTETRICS AND GYNECOLOGY | Facility: CLINIC | Age: 19
End: 2019-02-07

## 2019-02-07 VITALS
DIASTOLIC BLOOD PRESSURE: 80 MMHG | HEIGHT: 67 IN | BODY MASS INDEX: 35 KG/M2 | WEIGHT: 223 LBS | SYSTOLIC BLOOD PRESSURE: 124 MMHG

## 2019-02-07 DIAGNOSIS — Z30.430 ENCOUNTER FOR IUD INSERTION: Primary | ICD-10-CM

## 2019-02-07 PROCEDURE — 81025 URINE PREGNANCY TEST: CPT | Performed by: OBSTETRICS & GYNECOLOGY

## 2019-02-07 PROCEDURE — 58300 INSERT INTRAUTERINE DEVICE: CPT | Performed by: OBSTETRICS & GYNECOLOGY

## 2019-02-07 NOTE — PROGRESS NOTES
"IUD Insertion Procedure Note    Pre-operative Diagnosis: Desires contraception    Post-operative Diagnosis: normal    Indications: contraception    Procedure Details   Urine pregnancy test was done today and result was negative.  The risks (including infection, bleeding, pain, and uterine perforation) and benefits of the procedure were explained to the patient and Verbal informed consent was obtained.      Cervix cleansed with Betadine. Uterus sounded to 7 cm. IUD inserted without difficulty. String visible and trimmed.    IUD Information:  Kristin, Lot # [id number], Expiration date 1/2021, NDC 72319-633-37.    Condition:  Stable    Complications:  None  Patient tolerated the procedure well without complications.    Plan:    The patient was advised to call for any fever or for prolonged or severe pain or bleeding. She was advised to use NSAID as needed for mild to moderate pain.   Patient is going to \"lose\" insurance on 2/17/19.  She should do IUD check before this.    Anthony Esteves MD  "

## 2019-02-15 ENCOUNTER — OFFICE VISIT (OUTPATIENT)
Dept: OBSTETRICS AND GYNECOLOGY | Facility: CLINIC | Age: 19
End: 2019-02-15

## 2019-02-15 VITALS
HEART RATE: 75 BPM | WEIGHT: 221 LBS | BODY MASS INDEX: 34.61 KG/M2 | SYSTOLIC BLOOD PRESSURE: 133 MMHG | DIASTOLIC BLOOD PRESSURE: 92 MMHG

## 2019-02-15 DIAGNOSIS — Z30.431 IUD CHECK UP: Primary | ICD-10-CM

## 2019-02-15 PROCEDURE — 99213 OFFICE O/P EST LOW 20 MIN: CPT | Performed by: OBSTETRICS & GYNECOLOGY

## 2019-02-15 NOTE — PROGRESS NOTES
"Subjective   Debbie Bauman is a 18 y.o. female here for string check.  Pt c/o being able to feel IUD.     History of Present Illness   Patient is a 18-year-old who presents for IUD string check.  She had a Kristin IUD placed last week.  She reports vaginal bleeding.  She also states that she \"feels\" the IUD in her uterus, but states it is not painful or uncomfortable.    The following portions of the patient's history were reviewed and updated as appropriate: allergies, current medications, past family history, past medical history, past social history, past surgical history and problem list.    Review of Systems   Genitourinary: Positive for menstrual problem. Negative for pelvic pain.   All other systems reviewed and are negative.      Objective   Physical Exam  Physical Exam   Constitutional: She appears well-developed and well-nourished.   Abdominal: Soft. She exhibits no distension. There is no tenderness.   Genitourinary: Vagina normal and uterus normal. There is no rash, tenderness, lesion or injury on the right labia. There is no rash, tenderness, lesion or injury on the left labia. Cervix exhibits no motion tenderness, no discharge and no friability. Right adnexum displays no mass, no tenderness and no fullness. Left adnexum displays no mass, no tenderness and no fullness.   Genitourinary Comments: IUD strings visualized   Neurological: She is alert.   Psychiatric: She has a normal mood and affect.   Vitals reviewed.      Assessment/Plan   Debbie was seen today for follow-up.    Diagnoses and all orders for this visit:    IUD check up    IUD strings are visualized.  Discussed normal bleeding expectations after IUD placement and concerning signs and symptoms to be seen for.  She may follow-up for annual exam or sooner if needed.    Counseling was given to patient for the following topics: instructions for management and patient and family education . Total time of the encounter was 15 minutes and 10 " minutes was spend counseling.

## 2019-10-25 ENCOUNTER — OFFICE VISIT (OUTPATIENT)
Dept: OBSTETRICS AND GYNECOLOGY | Facility: CLINIC | Age: 19
End: 2019-10-25

## 2019-10-25 VITALS
WEIGHT: 208 LBS | SYSTOLIC BLOOD PRESSURE: 132 MMHG | DIASTOLIC BLOOD PRESSURE: 88 MMHG | BODY MASS INDEX: 32.65 KG/M2 | HEIGHT: 67 IN

## 2019-10-25 DIAGNOSIS — N89.8 VAGINAL DISCHARGE: Primary | ICD-10-CM

## 2019-10-25 DIAGNOSIS — R10.2 PELVIC PAIN: ICD-10-CM

## 2019-10-25 LAB
BILIRUB BLD-MCNC: NEGATIVE MG/DL
GLUCOSE UR STRIP-MCNC: NEGATIVE MG/DL
KETONES UR QL: NEGATIVE
LEUKOCYTE EST, POC: ABNORMAL
NITRITE UR-MCNC: NEGATIVE MG/ML
PH UR: 7 [PH] (ref 5–8)
PROT UR STRIP-MCNC: NEGATIVE MG/DL
RBC # UR STRIP: ABNORMAL /UL
SP GR UR: 1.02 (ref 1–1.03)
UROBILINOGEN UR QL: NORMAL

## 2019-10-25 PROCEDURE — 99213 OFFICE O/P EST LOW 20 MIN: CPT | Performed by: OBSTETRICS & GYNECOLOGY

## 2019-10-25 RX ORDER — NITROFURANTOIN 25; 75 MG/1; MG/1
100 CAPSULE ORAL 2 TIMES DAILY
Qty: 10 CAPSULE | Refills: 0 | Status: SHIPPED | OUTPATIENT
Start: 2019-10-25 | End: 2019-10-30

## 2019-10-25 NOTE — PROGRESS NOTES
Subjective   Debbie Bauman is a 19 y.o. female.     Cc:  Vaginal discharge and pelvic pain    History of Present Illness - Patient is a 19 year old female who presents with complaints of vaginal discharge, spotting and pelvic pain.  She has an IUD in place for 9 months.  She recently became sexually active and her symptoms worsened.  The partner who she is currently with was associated with an STD that she had during her pregnancy.  She has not had a work up or tried any medications.    The following portions of the patient's history were reviewed and updated as appropriate:   She  has a past medical history of ADHD (attention deficit hyperactivity disorder), Chlamydia, Depression, Hypertension, and Varicella.  She  reports that she quit smoking about 18 months ago. Her smoking use included cigarettes. She smoked 0.10 packs per day. She has never used smokeless tobacco. She reports that she does not drink alcohol or use drugs.  Current Outpatient Medications   Medication Sig Dispense Refill   • levonorgestrel (MIRENA, 52 MG,) 20 MCG/24HR IUD 1 each by Intrauterine route 1 (One) Time.     • nitrofurantoin, macrocrystal-monohydrate, (MACROBID) 100 MG capsule Take 1 capsule by mouth 2 (Two) Times a Day for 5 days. 10 capsule 0     No current facility-administered medications for this visit.      She is allergic to bee venom..    Review of Systems   Constitutional: Negative for chills and fever.   Genitourinary: Positive for pelvic pain and vaginal discharge.       Objective   Physical Exam   Constitutional: She appears well-developed and well-nourished.   Genitourinary: Uterus normal. Pelvic exam was performed with patient supine. There is no tenderness or lesion on the right labia. There is no tenderness or lesion on the left labia. Cervix exhibits no motion tenderness, no discharge and no friability. Right adnexum displays no mass and no tenderness. Left adnexum displays no mass and no tenderness. No foreign  body in the vagina. Vaginal discharge found.   Psychiatric: She has a normal mood and affect. Her behavior is normal. Judgment and thought content normal.   Vitals reviewed.      Assessment/Plan   Debbie was seen today for pelvic pain.    Diagnoses and all orders for this visit:    Vaginal discharge  -     Cancel: HIV-1 / O / 2 Ag / Antibody 4th Generation  -     Cancel: RPR  -     NuSwab VG+ - Swab, Vagina  -     Urine Culture - Urine, Urine, Clean Catch  -     HIV-1 / O / 2 Ag / Antibody 4th Generation  -     RPR  -     Await cultures.    Pelvic pain  -     POC Urinalysis Dipstick, Automated  -     nitrofurantoin, macrocrystal-monohydrate, (MACROBID) 100 MG capsule; Take 1 capsule by mouth 2 (Two) Times a Day for 5 days.  -     Treat for UTI first as urine dipstick had blood and leukocytes.  If all cultures negative and pain continues, consider pelvic sonogram.           Anthony Esteves MD

## 2019-10-27 LAB
BACTERIA UR CULT: NORMAL
BACTERIA UR CULT: NORMAL
RPR SER QL: NORMAL

## 2019-10-30 LAB
DIAGNOSTIC IMP SPEC-IMP: NORMAL
HIV 1 & 2 AB SERPLBLD IA.RAPID: NEGATIVE
HIV 1+2 AB+HIV1 P24 AG SERPL QL IA: REACTIVE
HIV 2 AB SERPLBLD QL IA.RAPID: NEGATIVE
HIV1 AB SERPLBLD QL IA.RAPID: NEGATIVE
HIV1 RNA SERPL QL NAA+PROBE: NEGATIVE

## 2019-10-31 LAB
A VAGINAE DNA VAG QL NAA+PROBE: ABNORMAL SCORE
BVAB2 DNA VAG QL NAA+PROBE: ABNORMAL SCORE
C ALBICANS DNA VAG QL NAA+PROBE: NEGATIVE
C GLABRATA DNA VAG QL NAA+PROBE: NEGATIVE
C TRACH RRNA SPEC QL NAA+PROBE: NEGATIVE
MEGA1 DNA VAG QL NAA+PROBE: ABNORMAL SCORE
N GONORRHOEA RRNA SPEC QL NAA+PROBE: NEGATIVE
T VAGINALIS RRNA SPEC QL NAA+PROBE: NEGATIVE

## 2019-11-04 ENCOUNTER — TELEPHONE (OUTPATIENT)
Dept: OBSTETRICS AND GYNECOLOGY | Facility: CLINIC | Age: 19
End: 2019-11-04

## 2019-11-04 RX ORDER — METRONIDAZOLE 500 MG/1
500 TABLET ORAL 2 TIMES DAILY
Qty: 14 TABLET | Refills: 0 | Status: SHIPPED | OUTPATIENT
Start: 2019-11-04 | End: 2019-11-11

## 2019-11-04 NOTE — TELEPHONE ENCOUNTER
Manisha    Let her know that her STD testing is negative.  However, she has a bacterial infection.   I called in antibiotics.    Danielito

## 2020-01-17 ENCOUNTER — OFFICE VISIT (OUTPATIENT)
Dept: INTERNAL MEDICINE | Facility: CLINIC | Age: 20
End: 2020-01-17

## 2020-01-17 VITALS
HEIGHT: 67 IN | TEMPERATURE: 97.6 F | HEART RATE: 83 BPM | OXYGEN SATURATION: 98 % | WEIGHT: 200.6 LBS | DIASTOLIC BLOOD PRESSURE: 84 MMHG | SYSTOLIC BLOOD PRESSURE: 128 MMHG | BODY MASS INDEX: 31.48 KG/M2

## 2020-01-17 DIAGNOSIS — F33.1 MODERATE EPISODE OF RECURRENT MAJOR DEPRESSIVE DISORDER (HCC): ICD-10-CM

## 2020-01-17 DIAGNOSIS — Z23 NEEDS FLU SHOT: ICD-10-CM

## 2020-01-17 DIAGNOSIS — Z78.9 FALSE POSITIVE SEROLOGY FOR HIV: ICD-10-CM

## 2020-01-17 DIAGNOSIS — D69.6 THROMBOCYTOPENIA (HCC): Primary | ICD-10-CM

## 2020-01-17 DIAGNOSIS — R79.89 LFT ELEVATION: ICD-10-CM

## 2020-01-17 DIAGNOSIS — Z02.4 ENCOUNTER FOR EXAMINATION FOR DRIVING LICENSE: ICD-10-CM

## 2020-01-17 DIAGNOSIS — I10 ESSENTIAL HYPERTENSION: ICD-10-CM

## 2020-01-17 PROBLEM — R41.840 INATTENTION: Status: RESOLVED | Noted: 2017-02-23 | Resolved: 2020-01-17

## 2020-01-17 PROBLEM — F33.9 MAJOR DEPRESSION, RECURRENT (HCC): Status: RESOLVED | Noted: 2020-01-17 | Resolved: 2020-01-17

## 2020-01-17 PROBLEM — F33.9 MAJOR DEPRESSION, RECURRENT: Status: ACTIVE | Noted: 2020-01-17

## 2020-01-17 PROBLEM — R63.1 INCREASED THIRST: Status: RESOLVED | Noted: 2018-01-08 | Resolved: 2020-01-17

## 2020-01-17 PROBLEM — M89.8X3 PAIN IN RADIUS: Status: RESOLVED | Noted: 2017-05-22 | Resolved: 2020-01-17

## 2020-01-17 PROBLEM — F32.9 MAJOR DEPRESSIVE DISORDER: Status: ACTIVE | Noted: 2020-01-17

## 2020-01-17 PROBLEM — N92.6 MENSTRUAL CYCLE PROBLEM: Status: RESOLVED | Noted: 2018-01-08 | Resolved: 2020-01-17

## 2020-01-17 PROBLEM — R42 DIZZINESS: Status: RESOLVED | Noted: 2018-01-08 | Resolved: 2020-01-17

## 2020-01-17 PROBLEM — O10.919 CHRONIC HYPERTENSION IN PREGNANCY: Status: RESOLVED | Noted: 2018-11-05 | Resolved: 2020-01-17

## 2020-01-17 PROCEDURE — 90471 IMMUNIZATION ADMIN: CPT | Performed by: FAMILY MEDICINE

## 2020-01-17 PROCEDURE — 90686 IIV4 VACC NO PRSV 0.5 ML IM: CPT | Performed by: FAMILY MEDICINE

## 2020-01-17 PROCEDURE — 99214 OFFICE O/P EST MOD 30 MIN: CPT | Performed by: FAMILY MEDICINE

## 2020-01-17 NOTE — PROGRESS NOTES
Date of Encounter: 2020  Patient: Debbie Bauman,  2000    Subjective   History of Presenting Illness  Chief complaint: Driving exam, follow-up    Patient presents for driving license medical exam.  Since  she is required medical examinations to renew her 's license.  She does not drive commercially.  In 2017 she had an episode of syncope related to her blood pressure medication.  She subsequently had an EKG that showed sinus arrhythmia but subsequent Holter monitoring, echocardiography was unremarkable.  She has not had any recurrent episodes of syncope.  She no longer takes the offending medication.  She has no history of seizures.    Other recent history includes bilateral community-acquired pneumonia treated in 2019, with concomitant labs demonstrating thrombocytopenia and elevated LFTs.  In 2019 she had a positive fourth-generation HIV test with negative subsequent antibody and MYNOR testing.  5 sexual partners within the past year, none from [place] (HIV 2).  No history of IV drug use.    Other problems include history of depression, history of menorrhagia with irregular cycles now treated with Mirena, history of hypertension not currently treated.    Lives at home with  and 1-year-old son, appears they are in the process of being .  Prior brief tobacco smoker, 2-3 alcoholic beverages per week, regular cannabis use.  Does not exercise.  She is an apprentice to become an .    Up-to-date on Tdap due to recent pregnancy, needs influenza.  HPV status unknown.  Upcoming gynecologist appointment for Pap.    Review of Systems:  Positive for sensation of body swelling, 3 days of diarrhea    Negative for chest pain, shortness of breath, vaginal discharge, abdominal pain, palpitations, syncope, dizziness, seizures, congestion, vision problems, hearing problems, rash.    The following portions of the patient's history were reviewed and  "updated as appropriate: allergies, current medications, past family history, past medical history, past social history, past surgical history and problem list.    Patient Active Problem List   Diagnosis   • Family history of diabetes mellitus   • Heart palpitations   • Menorrhagia with irregular cycle   • Thrombocytopenia (CMS/HCC)   • LFT elevation   • False positive serology for HIV     Past Medical History:   Diagnosis Date   • ADHD (attention deficit hyperactivity disorder)    • Chlamydia        • Chronic hypertension in pregnancy 2018   • Depression    • Dizziness 2018   • Essential hypertension 2018   • Hypertension    • Inattention 2017   • Increased thirst 2018   • Major depression, recurrent (CMS/HCC) 2020   • Menstrual cycle problem 2018   • Pain in radius 2017   • Varicella      Past Surgical History:   Procedure Laterality Date   • TONSILLECTOMY       Family History   Problem Relation Age of Onset   • Heart attack Father    • Diabetes Father    • Hypertension Father    • No Known Problems Mother    • Stroke Paternal Grandfather        Current Outpatient Medications:   •  levonorgestrel (MIRENA, 52 MG,) 20 MCG/24HR IUD, 1 each by Intrauterine route 1 (One) Time., Disp: , Rfl:   Allergies   Allergen Reactions   • Bee Venom Swelling     Social History     Tobacco Use   • Smoking status: Former Smoker     Packs/day: 0.10     Types: Cigarettes     Last attempt to quit: 3/28/2018     Years since quittin.8   • Smokeless tobacco: Never Used   Substance Use Topics   • Alcohol use: Yes     Comment: occasionally   • Drug use: No     Types: Marijuana     Comment: pt quit when she found out in March          Objective   Physical Exam  Vitals:    20 0805   BP: 128/84   Pulse: 83   Temp: 97.6 °F (36.4 °C)   SpO2: 98%   Weight: 91 kg (200 lb 9.6 oz)   Height: 170.2 cm (67\")     Body mass index is 31.42 kg/m².    Constitutional: NAD.  Eyes: EOMI. PERRLA. Normal " conjunctiva.  Ear, nose, mouth, throat: No tonsillar exudates or erythema.   Normal nasal mucosa. Normal external ear canals and TMs bilaterally.  Cardiovascular: RRR. No murmurs. No LE edema b/l. Radial pulses 2+ bilaterally.  Pulmonary: CTA b/l. Good effort.  Integumentary: No lacerations or wounds on face or upper extremities.  Lymphatic: No anterior cervical lymphadenopathy.  Endocrine: No thyromegaly or palpable thyroid nodules.  Psychiatric: Normal affect. Normal thought content.     Assessment/Plan   Assessment and Plan  Pleasant 19-year-old female with recent thrombocytopenia, LFT elevation, suspected false positive HIV serology, history of depression, menorrhagia with regular cycle, hypertension, who presents for driving license examination and follow-up.    Diagnoses and all orders for this visit:    1. Thrombocytopenia (CMS/HCC) (Primary): Suspect low in context of acute illness but will follow-up with labs as below.  We will also repeat HIV serology due to number of partners within the past year.  -     HIV-1/O/2 Ag/Ab w Reflex  -     CBC & Differential  -     Thyroid Panel With TSH    2. LFT elevation  -     Hepatitis Panel, Acute  -     Comprehensive Metabolic Panel    3. Essential hypertension: Not present today, in context of syncope with antihypertensive medications I do not think she needs treatment unless we can be convinced with ambulatory blood pressure measurements.    4. False positive serology for HIV: Repeat as per #1.    5. Encounter for examination for driving license: She is cleared from a medical perspective for driving and I do not think she needs recurrent examinations as she is not had any additional episodes of syncope and was adequately evaluated from a cardiac perspective.    7.  Major depressive disorder, recurrent: See PHQ 9.  Not currently interested in addressing this problem.    8. Needs flu shot  -     Fluarix/Fluzone/Afluria Quad/FluLaval Quad    Barring abnormalities as  above, return to office in 1 year for annual physical or earlier as needed.    Guillermo Ambriz MD  Family Medicine  O: [phone]  C: [phone]    Disclaimer: Parts of this note were dictated by speech recognition. Minor errors in transcription may be present. Please call if questions.

## 2020-01-18 LAB
ALBUMIN SERPL-MCNC: 4.5 G/DL (ref 3.5–5.5)
ALBUMIN/GLOB SERPL: 1.8 {RATIO} (ref 1.2–2.2)
ALP SERPL-CCNC: 67 IU/L (ref 39–117)
ALT SERPL-CCNC: 19 IU/L (ref 0–32)
AST SERPL-CCNC: 18 IU/L (ref 0–40)
BASOPHILS # BLD AUTO: 0 X10E3/UL (ref 0–0.2)
BASOPHILS NFR BLD AUTO: 0 %
BILIRUB SERPL-MCNC: <0.2 MG/DL (ref 0–1.2)
BUN SERPL-MCNC: 6 MG/DL (ref 6–20)
BUN/CREAT SERPL: 10 (ref 9–23)
CALCIUM SERPL-MCNC: 9.4 MG/DL (ref 8.7–10.2)
CHLORIDE SERPL-SCNC: 102 MMOL/L (ref 96–106)
CO2 SERPL-SCNC: 22 MMOL/L (ref 20–29)
CREAT SERPL-MCNC: 0.62 MG/DL (ref 0.57–1)
EOSINOPHIL # BLD AUTO: 0.2 X10E3/UL (ref 0–0.4)
EOSINOPHIL NFR BLD AUTO: 2 %
ERYTHROCYTE [DISTWIDTH] IN BLOOD BY AUTOMATED COUNT: 15 % (ref 11.7–15.4)
FT4I SERPL CALC-MCNC: 2.7 (ref 1.2–4.9)
GLOBULIN SER CALC-MCNC: 2.5 G/DL (ref 1.5–4.5)
GLUCOSE SERPL-MCNC: 96 MG/DL (ref 65–99)
HAV IGM SERPL QL IA: NEGATIVE
HBV CORE IGM SERPL QL IA: NEGATIVE
HBV SURFACE AG SERPL QL IA: NEGATIVE
HCT VFR BLD AUTO: 40.5 % (ref 34–46.6)
HCV AB S/CO SERPL IA: 0.1 S/CO RATIO (ref 0–0.9)
HGB BLD-MCNC: 13.5 G/DL (ref 11.1–15.9)
HIV 1+2 AB+HIV1 P24 AG SERPL QL IA: NON REACTIVE
IMM GRANULOCYTES # BLD AUTO: 0 X10E3/UL (ref 0–0.1)
IMM GRANULOCYTES NFR BLD AUTO: 0 %
LYMPHOCYTES # BLD AUTO: 1.8 X10E3/UL (ref 0.7–3.1)
LYMPHOCYTES NFR BLD AUTO: 27 %
MCH RBC QN AUTO: 28.4 PG (ref 26.6–33)
MCHC RBC AUTO-ENTMCNC: 33.3 G/DL (ref 31.5–35.7)
MCV RBC AUTO: 85 FL (ref 79–97)
MONOCYTES # BLD AUTO: 0.4 X10E3/UL (ref 0.1–0.9)
MONOCYTES NFR BLD AUTO: 7 %
NEUTROPHILS # BLD AUTO: 4.2 X10E3/UL (ref 1.4–7)
NEUTROPHILS NFR BLD AUTO: 64 %
PLATELET # BLD AUTO: 280 X10E3/UL (ref 150–450)
POTASSIUM SERPL-SCNC: 4.5 MMOL/L (ref 3.5–5.2)
PROT SERPL-MCNC: 7 G/DL (ref 6–8.5)
RBC # BLD AUTO: 4.75 X10E6/UL (ref 3.77–5.28)
SODIUM SERPL-SCNC: 139 MMOL/L (ref 134–144)
T3RU NFR SERPL: 29 % (ref 24–39)
T4 SERPL-MCNC: 9.3 UG/DL (ref 4.5–12)
TSH SERPL DL<=0.005 MIU/L-ACNC: 0.87 UIU/ML (ref 0.45–4.5)
WBC # BLD AUTO: 6.5 X10E3/UL (ref 3.4–10.8)

## 2020-01-20 NOTE — PROGRESS NOTES
Discussed the results over the phone with the patient as previously agreed. I have no concerns at this time.

## 2020-02-14 ENCOUNTER — PROCEDURE VISIT (OUTPATIENT)
Dept: OBSTETRICS AND GYNECOLOGY | Facility: CLINIC | Age: 20
End: 2020-02-14

## 2020-02-14 ENCOUNTER — OFFICE VISIT (OUTPATIENT)
Dept: OBSTETRICS AND GYNECOLOGY | Facility: CLINIC | Age: 20
End: 2020-02-14

## 2020-02-14 VITALS
BODY MASS INDEX: 32.18 KG/M2 | SYSTOLIC BLOOD PRESSURE: 134 MMHG | WEIGHT: 205 LBS | HEIGHT: 67 IN | DIASTOLIC BLOOD PRESSURE: 80 MMHG

## 2020-02-14 DIAGNOSIS — Z30.431 IUD CHECK UP: ICD-10-CM

## 2020-02-14 DIAGNOSIS — N93.0 BLEEDING AFTER INTERCOURSE: ICD-10-CM

## 2020-02-14 DIAGNOSIS — R10.2 PELVIC PAIN: Primary | ICD-10-CM

## 2020-02-14 DIAGNOSIS — Z01.419 ENCOUNTER FOR GYNECOLOGICAL EXAMINATION WITH PAPANICOLAOU SMEAR OF CERVIX: Primary | ICD-10-CM

## 2020-02-14 DIAGNOSIS — Z97.5 IUD (INTRAUTERINE DEVICE) IN PLACE: ICD-10-CM

## 2020-02-14 LAB
BILIRUB BLD-MCNC: NEGATIVE MG/DL
GLUCOSE UR STRIP-MCNC: NEGATIVE MG/DL
KETONES UR QL: NEGATIVE
LEUKOCYTE EST, POC: ABNORMAL
NITRITE UR-MCNC: NEGATIVE MG/ML
PH UR: 7 [PH] (ref 5–8)
PROT UR STRIP-MCNC: NEGATIVE MG/DL
RBC # UR STRIP: NEGATIVE /UL
SP GR UR: 1.01 (ref 1–1.03)
UROBILINOGEN UR QL: NORMAL

## 2020-02-14 PROCEDURE — 99395 PREV VISIT EST AGE 18-39: CPT | Performed by: OBSTETRICS & GYNECOLOGY

## 2020-02-14 PROCEDURE — 81003 URINALYSIS AUTO W/O SCOPE: CPT | Performed by: OBSTETRICS & GYNECOLOGY

## 2020-02-14 PROCEDURE — 76830 TRANSVAGINAL US NON-OB: CPT | Performed by: OBSTETRICS & GYNECOLOGY

## 2020-02-14 NOTE — PROGRESS NOTES
[facility]  [address], [address]  [place], [place] [address]  Phone: [phone] / Fax:  [phone]      2020    [address] [facility] [id number]    Guillermo Ambriz MD    Chief Complaint   Patient presents with   • Gynecologic Exam     Annual Exam, first pap. Patient c/o pain and bleeding intercourse.       Debbie Bauman is here for annual gynecologic exam.  HPI - Patient has IUD in place for contraception.  She reports bleeding after intercourse and some issues with painful intercourse.  She has had a work up in past that was negative.  She denies STD exposures.    Past Medical History:   Diagnosis Date   • ADHD (attention deficit hyperactivity disorder)    • Chlamydia        • Chronic hypertension in pregnancy 2018   • Depression    • Dizziness 2018   • Essential hypertension 2018   • Hypertension    • Inattention 2017   • Increased thirst 2018   • Major depression, recurrent (CMS/HCC) 2020   • Menstrual cycle problem 2018   • Pain in radius 2017   • Varicella        Past Surgical History:   Procedure Laterality Date   • TONSILLECTOMY         Allergies   Allergen Reactions   • Bee Venom Swelling       Social History     Socioeconomic History   • Marital status:      Spouse name: Not on file   • Number of children: 0   • Years of education: Not on file   • Highest education level: Not on file   Tobacco Use   • Smoking status: Former Smoker     Packs/day: 0.10     Types: Cigarettes     Last attempt to quit: 3/28/2018     Years since quittin.8   • Smokeless tobacco: Never Used   Substance and Sexual Activity   • Alcohol use: Yes     Comment: occasionally   • Drug use: No     Types: Marijuana   • Sexual activity: Yes     Partners: Male     Birth control/protection: IUD       Family History   Problem Relation Age of Onset   • Heart attack Father    • Diabetes Father    • Hypertension Father    • Ovarian cancer Mother    •  "Stroke Paternal Grandfather        Patient's last menstrual period was 2020.    OB History        2    Para   1    Term   1            AB   1    Living   1       SAB   1    TAB        Ectopic        Molar        Multiple   0    Live Births   1                Vitals:    20 1158   BP: 134/80   Weight: 93 kg (205 lb)   Height: 170.2 cm (67.01\")       Physical Exam   Constitutional: She appears well-developed and well-nourished.   Genitourinary: Vagina normal and uterus normal. Pelvic exam was performed with patient supine. There is no tenderness or lesion on the right labia. There is no tenderness or lesion on the left labia. Right adnexum does not display tenderness and does not display fullness. Left adnexum does not display tenderness and does not display fullness. Cervix does not exhibit motion tenderness, lesion or visible IUD strings.   HENT:   Right Ear: External ear normal.   Left Ear: External ear normal.   Nose: Nose normal.   Eyes: Conjunctivae are normal.   Neck: Normal range of motion. Neck supple. No thyromegaly present.   Cardiovascular: Normal rate, regular rhythm and normal heart sounds.   Pulmonary/Chest: Effort normal. No stridor. She has no wheezes. Right breast exhibits no mass and no nipple discharge. Left breast exhibits no mass and no nipple discharge.   Abdominal: Soft. She exhibits no mass. There is no rebound and no guarding.   Musculoskeletal: Normal range of motion. She exhibits no edema.   Neurological: She is alert. Coordination normal.   Skin: Skin is warm and dry.   Psychiatric: She has a normal mood and affect. Her behavior is normal. Judgment and thought content normal.   Vitals reviewed.    Ultrasound identified IUD in appropriate location and no pelvic lesions    Debbie was seen today for gynecologic exam.    Diagnoses and all orders for this visit:    Encounter for gynecological examination with Papanicolaou smear of cervix  -     IGP, Rfx Aptima HPV " ASCU  -     Pap testing done.  Discussed importance of regular screening and breast awareness.    Bleeding after intercourse  -     NuSwab VG+ - Swab, Vagina  -     POC Urinalysis Dipstick, Automated  -     Urine Culture - Urine, Urine, Clean Catch  -     Ultrasound reveals appropriate location of IUD.  Await cultures.  If bleeding persists, consideration for removal of IUD.        Anthony Esteves MD

## 2020-02-16 LAB
BACTERIA UR CULT: NORMAL
BACTERIA UR CULT: NORMAL

## 2020-02-18 LAB
A VAGINAE DNA VAG QL NAA+PROBE: NORMAL SCORE
BVAB2 DNA VAG QL NAA+PROBE: NORMAL SCORE
C ALBICANS DNA VAG QL NAA+PROBE: NEGATIVE
C GLABRATA DNA VAG QL NAA+PROBE: NEGATIVE
C TRACH RRNA SPEC QL NAA+PROBE: NEGATIVE
CONV .: NORMAL
CYTOLOGIST CVX/VAG CYTO: NORMAL
CYTOLOGY CVX/VAG DOC CYTO: NORMAL
CYTOLOGY CVX/VAG DOC THIN PREP: NORMAL
DX ICD CODE: NORMAL
HIV 1 & 2 AB SER-IMP: NORMAL
MEGA1 DNA VAG QL NAA+PROBE: NORMAL SCORE
N GONORRHOEA RRNA SPEC QL NAA+PROBE: NEGATIVE
OTHER STN SPEC: NORMAL
STAT OF ADQ CVX/VAG CYTO-IMP: NORMAL
T VAGINALIS RRNA SPEC QL NAA+PROBE: NEGATIVE

## 2020-02-19 ENCOUNTER — TELEPHONE (OUTPATIENT)
Dept: OBSTETRICS AND GYNECOLOGY | Facility: CLINIC | Age: 20
End: 2020-02-19

## 2020-02-19 NOTE — TELEPHONE ENCOUNTER
Patient aware of results. 2-  ----- Message from Anthony Esteves MD sent at 2/18/2020  6:08 PM EST -----  LAW - Let her know that her tests were normal.

## 2020-05-18 ENCOUNTER — APPOINTMENT (OUTPATIENT)
Dept: GENERAL RADIOLOGY | Facility: HOSPITAL | Age: 20
End: 2020-05-18

## 2020-05-18 ENCOUNTER — HOSPITAL ENCOUNTER (EMERGENCY)
Facility: HOSPITAL | Age: 20
Discharge: HOME | End: 2020-05-18
Attending: EMERGENCY MEDICINE | Admitting: EMERGENCY MEDICINE

## 2020-05-18 VITALS
DIASTOLIC BLOOD PRESSURE: 74 MMHG | BODY MASS INDEX: 32.11 KG/M2 | RESPIRATION RATE: 16 BRPM | TEMPERATURE: 97.4 F | SYSTOLIC BLOOD PRESSURE: 118 MMHG | HEART RATE: 73 BPM | OXYGEN SATURATION: 97 % | HEIGHT: 67 IN

## 2020-05-18 DIAGNOSIS — S39.012A BACK STRAIN, INITIAL ENCOUNTER: ICD-10-CM
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
## 2020-05-18 PROCEDURE — 72110 X-RAY EXAM L-2 SPINE 4/>VWS: CPT

## 2020-05-18 PROCEDURE — 72050 X-RAY EXAM NECK SPINE 4/5VWS: CPT

## 2020-05-18 PROCEDURE — 99283 EMERGENCY DEPT VISIT LOW MDM: CPT

## 2020-05-18 PROCEDURE — 72072 X-RAY EXAM THORAC SPINE 3VWS: CPT

## 2020-05-18 RX ORDER — CYCLOBENZAPRINE HCL 10 MG
10 TABLET ORAL ONCE
Status: COMPLETED | OUTPATIENT
Start: 2020-05-18 | End: 2020-05-18

## 2020-05-18 RX ORDER — CYCLOBENZAPRINE HCL 10 MG
10 TABLET ORAL 3 TIMES DAILY PRN
Qty: 15 TABLET | Refills: 0 | Status: SHIPPED | OUTPATIENT
Start: 2020-05-18 | End: 2020-08-28

## 2020-05-18 RX ADMIN — CYCLOBENZAPRINE 10 MG: 10 TABLET, FILM COATED ORAL at 19:24
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
## 2020-07-30 ENCOUNTER — TELEPHONE (OUTPATIENT)
Dept: OBSTETRICS AND GYNECOLOGY | Facility: CLINIC | Age: 20
End: 2020-07-30

## 2020-07-30 NOTE — TELEPHONE ENCOUNTER
Good Morning,     Patient believes she has a yeast infection. She is requesting to be seen today.     Please advise, thank you.

## 2020-08-28 ENCOUNTER — OFFICE VISIT (OUTPATIENT)
Dept: INTERNAL MEDICINE | Facility: CLINIC | Age: 20
End: 2020-08-28

## 2020-08-28 VITALS
HEART RATE: 99 BPM | SYSTOLIC BLOOD PRESSURE: 152 MMHG | WEIGHT: 170 LBS | DIASTOLIC BLOOD PRESSURE: 90 MMHG | BODY MASS INDEX: 26.63 KG/M2 | OXYGEN SATURATION: 98 %

## 2020-08-28 DIAGNOSIS — R10.30 LOWER ABDOMINAL PAIN: Primary | ICD-10-CM

## 2020-08-28 DIAGNOSIS — T14.8XXA BRUISING: ICD-10-CM

## 2020-08-28 DIAGNOSIS — R30.0 DYSURIA: ICD-10-CM

## 2020-08-28 DIAGNOSIS — R63.0 POOR APPETITE: ICD-10-CM

## 2020-08-28 DIAGNOSIS — R11.0 NAUSEA: ICD-10-CM

## 2020-08-28 DIAGNOSIS — R10.13 EPIGASTRIC ABDOMINAL PAIN: ICD-10-CM

## 2020-08-28 PROBLEM — N83.202 CYST OF LEFT OVARY: Status: ACTIVE | Noted: 2020-08-28

## 2020-08-28 PROCEDURE — 99214 OFFICE O/P EST MOD 30 MIN: CPT | Performed by: FAMILY MEDICINE

## 2020-08-28 RX ORDER — FAMOTIDINE 20 MG/1
20 TABLET, FILM COATED ORAL 2 TIMES DAILY
Qty: 60 TABLET | Refills: 1 | Status: SHIPPED | OUTPATIENT
Start: 2020-08-28 | End: 2020-12-22

## 2020-08-28 RX ORDER — ONDANSETRON 4 MG/1
4 TABLET, FILM COATED ORAL EVERY 8 HOURS PRN
Qty: 30 TABLET | Refills: 0 | Status: SHIPPED | OUTPATIENT
Start: 2020-08-28 | End: 2020-12-22

## 2020-08-29 NOTE — PROGRESS NOTES
Date of Encounter: 2020  Patient: Debbie Bauman,  2000    Subjective   History of Presenting Illness  Chief complaint: Fatigue, abdominal discomfort    Patient reports 2 months of worsening fatigue, poor appetite, postprandial nausea, dysuria, epigastric discomfort and lower abdominal discomfort.  Associated symptoms also include postprandial diarrhea.  No major change in appetite.  Denies fever, chills, headache, chest pain, cough, shortness of breath, wheezing, vaginal discharge, palpitations.    Recently evaluated for similar complaints 2020 by OB/GYN, treated as UTI.  Transvaginal ultrasound 2020 demonstrated IUD in place, anteverted uterus, complex left ovarian cyst approximately 2 cm.    No change in sexual partners.  Recent menstrual bleeding.    Review of Systems:  Per HPI.    The following portions of the patient's history were reviewed and updated as appropriate: allergies, current medications, past family history, past medical history, past social history, past surgical history and problem list.    Patient Active Problem List   Diagnosis   • Family history of diabetes mellitus   • Heart palpitations   • Menorrhagia with irregular cycle   • Major depressive disorder   • Thrombocytopenia (CMS/HCC)   • LFT elevation   • False positive serology for HIV     Past Medical History:   Diagnosis Date   • ADHD (attention deficit hyperactivity disorder)    • Chlamydia        • Chronic hypertension in pregnancy 2018   • Depression    • Dizziness 2018   • Essential hypertension 2018   • Hypertension    • Inattention 2017   • Increased thirst 2018   • Major depression, recurrent (CMS/HCC) 2020   • Menstrual cycle problem 2018   • Pain in radius 2017   • Varicella      Past Surgical History:   Procedure Laterality Date   • TONSILLECTOMY       Family History   Problem Relation Age of Onset   • Heart attack Father    • Diabetes Father    • Hypertension  Father    • Ovarian cancer Mother    • Stroke Paternal Grandfather        Current Outpatient Medications:   •  Levonorgestrel (ANITHA) 13.5 MG intrauterine device IUD, 1 each by Intrauterine route 1 (One) Time., Disp: , Rfl:   •  cyclobenzaprine (FLEXERIL) 10 MG tablet, Take 1 tablet by mouth 3 (Three) Times a Day As Needed for Muscle Spasms., Disp: 15 tablet, Rfl: 0  •  famotidine (Pepcid) 20 MG tablet, Take 1 tablet by mouth 2 (Two) Times a Day., Disp: 60 tablet, Rfl: 1  •  ondansetron (Zofran) 4 MG tablet, Take 1 tablet by mouth Every 8 (Eight) Hours As Needed for Nausea or Vomiting., Disp: 30 tablet, Rfl: 0  Allergies   Allergen Reactions   • Bee Venom Swelling     Social History     Tobacco Use   • Smoking status: Former Smoker     Packs/day: 0.10     Types: Cigarettes     Last attempt to quit: 3/28/2018     Years since quittin.4   • Smokeless tobacco: Never Used   Substance Use Topics   • Alcohol use: Yes     Comment: occasionally   • Drug use: Yes     Types: Marijuana     Comment: occasionally          Objective   Physical Exam  Vitals:    20 0742   BP: 152/90   Pulse: 99   SpO2: 98%   Weight: 77.1 kg (170 lb)     Body mass index is 26.63 kg/m².    Constitutional: NAD.  Eyes: EOMI. PERRLA. Normal conjunctiva.  Ear, nose, mouth, throat: No tonsillar exudates or erythema.   Normal nasal mucosa. Normal external ear canals and TMs bilaterally.  Cardiovascular: RRR. No murmurs. No LE edema b/l. Radial pulses 2+ bilaterally.  Pulmonary: CTA b/l. Good effort.  Gastrointestinal: Mild pain with epigastric palpation, right and left lower quadrant palpation without rebound tenderness.  No hepatosplenomegaly.  Normal bowel sounds.  No palpable masses.  Integumentary: No rashes or wounds on face or upper extremities.  Lymphatic: No anterior cervical lymphadenopathy.  Endocrine: No thyromegaly or palpable thyroid nodules.  Psychiatric: Blunted affect. Normal thought content.     Assessment/Plan   Assessment and  Plan  Pleasant 19-year-old female with recent thrombocytopenia, LFT elevation, suspected false positive HIV serology, history of depression, menorrhagia with regular cycle, hypertension, who presents for the following:    Diagnoses and all orders for this visit:    1. Lower abdominal pain (Primary)  2. Epigastric abdominal pain  3. Nausea  4. Poor appetite  5. Bruising  6. Dysuria    Other orders  -     ondansetron (Zofran) 4 MG tablet; Take 1 tablet by mouth Every 8 (Eight) Hours As Needed for Nausea or Vomiting.  Dispense: 30 tablet; Refill: 0  -     famotidine (Pepcid) 20 MG tablet; Take 1 tablet by mouth 2 (Two) Times a Day.  Dispense: 60 tablet; Refill: 1    Differential includes UTI, STD, ovarian cyst, GERD, nonacute abdomen so less likely appendicitis or diverticulitis.  IUD demonstrated in place on a recent exam.  Initial evaluation as above, will treat empirically for gastritis due to postprandial symptoms and epigastric tenderness, discussed reasons to go to the hospital.    Guillermo Ambriz MD  Family Medicine  O: [phone]  C: [phone]    Disclaimer: Parts of this note were dictated by speech recognition. Minor errors in transcription may be present. Please call if questions.

## 2020-08-30 DIAGNOSIS — I10 ESSENTIAL HYPERTENSION: Primary | ICD-10-CM

## 2020-08-30 LAB
ALBUMIN SERPL-MCNC: 4.6 G/DL (ref 3.9–5)
ALBUMIN/GLOB SERPL: 2.2 {RATIO} (ref 1.2–2.2)
ALP SERPL-CCNC: 66 IU/L (ref 39–117)
ALT SERPL-CCNC: 18 IU/L (ref 0–32)
APPEARANCE UR: CLEAR
AST SERPL-CCNC: 18 IU/L (ref 0–40)
BACTERIA #/AREA URNS HPF: NORMAL /[HPF]
BACTERIA UR CULT: NO GROWTH
BACTERIA UR CULT: NORMAL
BASOPHILS # BLD AUTO: 0 X10E3/UL (ref 0–0.2)
BASOPHILS NFR BLD AUTO: 0 %
BILIRUB SERPL-MCNC: 0.3 MG/DL (ref 0–1.2)
BILIRUB UR QL STRIP: NEGATIVE
BUN SERPL-MCNC: 7 MG/DL (ref 6–20)
BUN/CREAT SERPL: 10 (ref 9–23)
C TRACH RRNA SPEC QL NAA+PROBE: NEGATIVE
CALCIUM SERPL-MCNC: 9.2 MG/DL (ref 8.7–10.2)
CHLORIDE SERPL-SCNC: 103 MMOL/L (ref 96–106)
CO2 SERPL-SCNC: 23 MMOL/L (ref 20–29)
COLOR UR: YELLOW
CREAT SERPL-MCNC: 0.7 MG/DL (ref 0.57–1)
EOSINOPHIL # BLD AUTO: 0.1 X10E3/UL (ref 0–0.4)
EOSINOPHIL NFR BLD AUTO: 1 %
EPI CELLS #/AREA URNS HPF: NORMAL /HPF (ref 0–10)
ERYTHROCYTE [DISTWIDTH] IN BLOOD BY AUTOMATED COUNT: 12.5 % (ref 11.7–15.4)
FT4I SERPL CALC-MCNC: 2.1 (ref 1.2–4.9)
GLOBULIN SER CALC-MCNC: 2.1 G/DL (ref 1.5–4.5)
GLUCOSE SERPL-MCNC: 86 MG/DL (ref 65–99)
GLUCOSE UR QL: NEGATIVE
HCG INTACT+B SERPL-ACNC: <1 MIU/ML
HCT VFR BLD AUTO: 40 % (ref 34–46.6)
HGB BLD-MCNC: 13.2 G/DL (ref 11.1–15.9)
HGB UR QL STRIP: NEGATIVE
IMM GRANULOCYTES # BLD AUTO: 0 X10E3/UL (ref 0–0.1)
IMM GRANULOCYTES NFR BLD AUTO: 0 %
KETONES UR QL STRIP: NEGATIVE
LEUKOCYTE ESTERASE UR QL STRIP: NEGATIVE
LIPASE SERPL-CCNC: 19 U/L (ref 14–72)
LYMPHOCYTES # BLD AUTO: 2.5 X10E3/UL (ref 0.7–3.1)
LYMPHOCYTES NFR BLD AUTO: 28 %
MCH RBC QN AUTO: 29.7 PG (ref 26.6–33)
MCHC RBC AUTO-ENTMCNC: 33 G/DL (ref 31.5–35.7)
MCV RBC AUTO: 90 FL (ref 79–97)
MICRO URNS: NORMAL
MICRO URNS: NORMAL
MONOCYTES # BLD AUTO: 0.7 X10E3/UL (ref 0.1–0.9)
MONOCYTES NFR BLD AUTO: 8 %
MUCOUS THREADS URNS QL MICRO: PRESENT
N GONORRHOEA RRNA SPEC QL NAA+PROBE: NEGATIVE
NEUTROPHILS # BLD AUTO: 5.6 X10E3/UL (ref 1.4–7)
NEUTROPHILS NFR BLD AUTO: 63 %
NITRITE UR QL STRIP: NEGATIVE
PH UR STRIP: 6 [PH] (ref 5–7.5)
PLATELET # BLD AUTO: 237 X10E3/UL (ref 150–450)
POTASSIUM SERPL-SCNC: 4 MMOL/L (ref 3.5–5.2)
PROT SERPL-MCNC: 6.7 G/DL (ref 6–8.5)
PROT UR QL STRIP: NEGATIVE
RBC # BLD AUTO: 4.44 X10E6/UL (ref 3.77–5.28)
RBC #/AREA URNS HPF: NORMAL /HPF (ref 0–2)
SODIUM SERPL-SCNC: 143 MMOL/L (ref 134–144)
SP GR UR: 1.01 (ref 1–1.03)
T VAGINALIS DNA SPEC QL NAA+PROBE: NEGATIVE
T3RU NFR SERPL: 29 % (ref 24–39)
T4 SERPL-MCNC: 7.1 UG/DL (ref 4.5–12)
TSH SERPL DL<=0.005 MIU/L-ACNC: 0.87 UIU/ML (ref 0.45–4.5)
UROBILINOGEN UR STRIP-MCNC: 0.2 MG/DL (ref 0.2–1)
WBC # BLD AUTO: 9 X10E3/UL (ref 3.4–10.8)
WBC #/AREA URNS HPF: NORMAL /HPF (ref 0–5)

## 2020-08-30 RX ORDER — NIFEDIPINE 30 MG/1
30 TABLET, FILM COATED, EXTENDED RELEASE ORAL DAILY
Qty: 60 TABLET | Refills: 1 | Status: SHIPPED | OUTPATIENT
Start: 2020-08-30 | End: 2020-09-08 | Stop reason: SDUPTHER

## 2020-08-30 NOTE — PROGRESS NOTES
Discussed the results over the phone with the patient as previously agreed.    Pt has been checking ambulatory Bps, 150s/90s, sometimes 160s systolic at home over the past few days. Measurements in clinic have been elevated over the past year. Previously on nifedipine which she tolerated well. Prior dx gestational htn    Will restart nifedipine and have her follow closely. Abd US for unspecified abd discomfort, if not improving she needs to RTO for further evaluation, initial lab evaluation unremarkable

## 2020-09-02 ENCOUNTER — HOSPITAL ENCOUNTER (OUTPATIENT)
Dept: ULTRASOUND IMAGING | Facility: HOSPITAL | Age: 20
Discharge: HOME | End: 2020-09-02
Admitting: FAMILY MEDICINE

## 2020-09-02 PROCEDURE — 76700 US EXAM ABDOM COMPLETE: CPT

## 2020-09-08 DIAGNOSIS — I10 ESSENTIAL HYPERTENSION: ICD-10-CM

## 2020-09-08 RX ORDER — NIFEDIPINE 60 MG/1
60 TABLET, FILM COATED, EXTENDED RELEASE ORAL DAILY
Qty: 30 TABLET | Refills: 2 | Status: SHIPPED | OUTPATIENT
Start: 2020-09-08 | End: 2021-03-12

## 2020-09-15 ENCOUNTER — OFFICE VISIT (OUTPATIENT)
Dept: INTERNAL MEDICINE | Facility: CLINIC | Age: 20
End: 2020-09-15

## 2020-09-15 DIAGNOSIS — J40 BRONCHITIS: Primary | ICD-10-CM

## 2020-09-15 PROCEDURE — 99213 OFFICE O/P EST LOW 20 MIN: CPT | Performed by: FAMILY MEDICINE

## 2020-09-15 RX ORDER — AZITHROMYCIN 250 MG/1
TABLET, FILM COATED ORAL
Qty: 6 TABLET | Refills: 0 | Status: SHIPPED | OUTPATIENT
Start: 2020-09-15 | End: 2020-12-22

## 2020-09-15 NOTE — PROGRESS NOTES
Date of Encounter: 09/15/2020  Patient: Debbie Bauman,  2000    Subjective   History of Presenting Illness  Chief complaint: Cough    Nasal congestion, productive cough, started 4 days ago. Associated with nausea, headache, some upper back discomfort which has now resolved. Worse in the morning. Denies fever and chills. Tolerating PO. Second degree covid contact. Taking OTC zyrtec which is not improving symptoms.     She is worried about taking off of work for even 2 days due to financial trouble, she does not have a good support system here.    Review of Systems:  Per HPI    The following portions of the patient's history were reviewed and updated as appropriate: allergies, current medications, past family history, past medical history, past social history, past surgical history and problem list.    Patient Active Problem List   Diagnosis   • Family history of diabetes mellitus   • Heart palpitations   • Menorrhagia with irregular cycle   • Major depressive disorder   • Thrombocytopenia (CMS/HCC)   • LFT elevation   • False positive serology for HIV   • Cyst of left ovary     Past Medical History:   Diagnosis Date   • ADHD (attention deficit hyperactivity disorder)    • Chlamydia        • Chronic hypertension in pregnancy 2018   • Depression    • Dizziness 2018   • Essential hypertension 2018   • Hypertension    • Inattention 2017   • Increased thirst 2018   • Major depression, recurrent (CMS/HCC) 2020   • Menstrual cycle problem 2018   • Pain in radius 2017   • Varicella      Past Surgical History:   Procedure Laterality Date   • TONSILLECTOMY       Family History   Problem Relation Age of Onset   • Heart attack Father    • Diabetes Father    • Hypertension Father    • Ovarian cancer Mother    • Stroke Paternal Grandfather        Current Outpatient Medications:   •  Levonorgestrel (ANITAH) 13.5 MG intrauterine device IUD, 1 each by Intrauterine route 1 (One)  Time., Disp: , Rfl:   •  NIFEdipine CC (ADALAT CC) 60 MG 24 hr tablet, Take 1 tablet by mouth Daily., Disp: 30 tablet, Rfl: 2  •  famotidine (Pepcid) 20 MG tablet, Take 1 tablet by mouth 2 (Two) Times a Day., Disp: 60 tablet, Rfl: 1  •  ondansetron (Zofran) 4 MG tablet, Take 1 tablet by mouth Every 8 (Eight) Hours As Needed for Nausea or Vomiting., Disp: 30 tablet, Rfl: 0  Allergies   Allergen Reactions   • Bee Venom Swelling     Social History     Tobacco Use   • Smoking status: Former Smoker     Packs/day: 0.10     Types: Cigarettes     Quit date: 3/28/2018     Years since quittin.4   • Smokeless tobacco: Never Used   Substance Use Topics   • Alcohol use: Yes     Comment: occasionally   • Drug use: Yes     Types: Marijuana     Comment: occasionally          Objective   Physical Exam  There were no vitals filed for this visit.  There is no height or weight on file to calculate BMI.    Constitutional: NAD.  Psychiatric: Normal affect. Normal thought content.     Assessment/Plan   Assessment and Plan  Pleasant 20-year-old female with recent thrombocytopenia, LFT elevation, suspected false positive HIV serology, history of depression, menorrhagia with regular cycle, hypertension, who presents for the following:    Diagnoses and all orders for this visit:    1. Bronchitis (Primary)  -     COVID-19,[facility] ROUTINE, NP/OP SWAB IN TRANSPORT MEDIA OR ESWAB 72 HR TAT - Swab, Nasopharynx; Future  -     azithromycin (Zithromax) 250 MG tablet; Take 2 tablets the first day, then 1 tablet daily for 4 days.  Dispense: 6 tablet; Refill: 0    Will treat as bronchitis due to history of reactive airway disease and above-mentioned symptoms, but will also test her for COVID-19 as she is meeting several criteria and has contacts at work.    She is having significant social economic distress, will try to see if we can get someone from the health and resources department to reach out to her for help.    Guillermo Ambriz MD  Family  Medicine  O: [phone]  C: [phone]    Disclaimer: Parts of this note were dictated by speech recognition. Minor errors in transcription may be present. Please call if questions.

## 2020-12-22 ENCOUNTER — OFFICE VISIT (OUTPATIENT)
Dept: INTERNAL MEDICINE | Facility: CLINIC | Age: 20
End: 2020-12-22

## 2020-12-22 ENCOUNTER — E-VISIT (OUTPATIENT)
Dept: INTERNAL MEDICINE | Facility: CLINIC | Age: 20
End: 2020-12-22

## 2020-12-22 DIAGNOSIS — R10.13 DYSPEPSIA: Primary | ICD-10-CM

## 2020-12-22 PROBLEM — R11.10 VOMITING: Status: ACTIVE | Noted: 2020-03-31

## 2020-12-22 PROBLEM — R79.89 LFT ELEVATION: Status: RESOLVED | Noted: 2020-01-17 | Resolved: 2020-12-22

## 2020-12-22 PROBLEM — N92.6 ABNORMAL MENSTRUATION: Status: ACTIVE | Noted: 2020-10-01

## 2020-12-22 PROBLEM — D69.6 THROMBOCYTOPENIA: Status: RESOLVED | Noted: 2020-01-17 | Resolved: 2020-12-22

## 2020-12-22 PROBLEM — R00.0 FAST HEART BEAT: Status: ACTIVE | Noted: 2020-08-01

## 2020-12-22 PROCEDURE — 99213 OFFICE O/P EST LOW 20 MIN: CPT | Performed by: FAMILY MEDICINE

## 2020-12-22 PROCEDURE — 99999 PR OFFICE/OUTPT VISIT,PROCEDURE ONLY: CPT | Performed by: FAMILY MEDICINE

## 2020-12-22 RX ORDER — PANTOPRAZOLE SODIUM 40 MG/1
40 TABLET, DELAYED RELEASE ORAL DAILY
Qty: 30 TABLET | Refills: 0 | OUTPATIENT
Start: 2020-12-22 | End: 2021-01-11

## 2020-12-22 NOTE — PROGRESS NOTES
Date of Encounter: 2020  Patient: Debbie Bauman,  2000    Subjective   History of Presenting Illness  Chief complaint: Vomiting    Since last visit patient did not take famotidine because she was concerned about side effects of medications.  Subsequently she continues to have fatigue, poor appetite, postprandial nausea, epigastric discomfort, occasional vomiting, and weight loss due to inability to eat.  Has been worsening over the past month.  She also has intermittent dizziness as previously noted.    Irregular menstrual periods on Kristin IUD.  She also complains of watery vaginal discharge.  Recent new sexual partner but she has been using condoms regularly.  No up-to-date STI testing.  Kristin IUD in place 2020.    She plans to move to live with her sister in [place] in early January and is very anxious about these medical problems.    Review of Systems:  Negative for fever, cough, shortness of breath, headache    Positive for diarrhea, cough    The following portions of the patient's history were reviewed and updated as appropriate: allergies, current medications, past family history, past medical history, past social history, past surgical history and problem list.    Patient Active Problem List   Diagnosis   • Family history of diabetes mellitus   • Heart palpitations   • Menorrhagia with irregular cycle   • Major depressive disorder   • Thrombocytopenia (CMS/HCC)   • LFT elevation   • False positive serology for HIV   • Cyst of left ovary   • Abnormal menstruation   • Fast heart beat   • Vomiting     Past Medical History:   Diagnosis Date   • ADHD (attention deficit hyperactivity disorder)    • Chlamydia        • Chronic hypertension in pregnancy 2018   • Depression    • Dizziness 2018   • Essential hypertension 2018   • Hypertension    • Inattention 2017   • Increased thirst 2018   • Major depression, recurrent (CMS/HCC) 2020   • Menstrual cycle problem 2018    • Pain in radius 2017   • Varicella      Past Surgical History:   Procedure Laterality Date   • TONSILLECTOMY       Family History   Problem Relation Age of Onset   • Heart attack Father    • Diabetes Father    • Hypertension Father    • Ovarian cancer Mother    • Stroke Paternal Grandfather        Current Outpatient Medications:   •  Levonorgestrel (ANITHA) 13.5 MG intrauterine device IUD, 1 each by Intrauterine route 1 (One) Time., Disp: , Rfl:   •  NIFEdipine CC (ADALAT CC) 60 MG 24 hr tablet, Take 1 tablet by mouth Daily., Disp: 30 tablet, Rfl: 2  •  ondansetron (Zofran) 4 MG tablet, Take 1 tablet by mouth Every 8 (Eight) Hours As Needed for Nausea or Vomiting., Disp: 30 tablet, Rfl: 0  Allergies   Allergen Reactions   • Bee Venom Swelling     Social History     Tobacco Use   • Smoking status: Former Smoker     Packs/day: 0.10     Types: Cigarettes     Quit date: 3/28/2018     Years since quittin.7   • Smokeless tobacco: Never Used   Substance Use Topics   • Alcohol use: Yes     Comment: occasionally   • Drug use: Yes     Types: Marijuana     Comment: occasionally          Objective   Physical Exam  There were no vitals filed for this visit.  There is no height or weight on file to calculate BMI.    Psychiatric: Anxious affect. Normal thought content.  Poor insight, fair judgment.     Assessment/Plan   Assessment and Plan  Pleasant 20-year-old female with false positive HIV serology, history of depression, menorrhagia with regular cycle, hypertension, who presents with the following:    Diagnoses and all orders for this visit:    1. Dyspepsia (Primary)  -     pantoprazole (PROTONIX) 40 MG EC tablet; Take 1 tablet by mouth Daily.  Dispense: 30 tablet; Refill: 0    Her symptom symptoms remain consistent with dyspepsia and she did not try famotidine as previously recommended.  Abdominal ultrasound was unremarkable, IUD was demonstrated as in place, STI screening in August was normal.    Recommend she  start PPI. We will have her in the office in 1 week for examination, pelvic examination the swab as well for vaginal discharge which may be contributing to her current symptoms.    You have chosen to receive care through a telephone visit. Do you consent to use a telephone visit for your medical care today? Yes    Spent approximately 20 minutes on the phone counseling the patient    Guillermo Ambriz MD  Family Medicine  O: [phone]  C: [phone]    Disclaimer: Parts of this note were dictated by speech recognition. Minor errors in transcription may be present. Please call if questions.

## 2020-12-23 ENCOUNTER — HOSPITAL ENCOUNTER (EMERGENCY)
Facility: HOSPITAL | Age: 20
Discharge: HOME | End: 2020-12-24
Attending: EMERGENCY MEDICINE | Admitting: EMERGENCY MEDICINE

## 2020-12-23 ENCOUNTER — APPOINTMENT (OUTPATIENT)
Dept: CT IMAGING | Facility: HOSPITAL | Age: 20
End: 2020-12-23

## 2020-12-23 ENCOUNTER — APPOINTMENT (OUTPATIENT)
Dept: GENERAL RADIOLOGY | Facility: HOSPITAL | Age: 20
End: 2020-12-23

## 2020-12-23 DIAGNOSIS — R10.13 EPIGASTRIC PAIN: Primary | ICD-10-CM

## 2020-12-23 DIAGNOSIS — R11.2 NON-INTRACTABLE VOMITING WITH NAUSEA, UNSPECIFIED VOMITING TYPE: ICD-10-CM

## 2020-12-23 LAB
ALBUMIN SERPL-MCNC: 4.9 G/DL (ref 3.5–5.2)
ALBUMIN/GLOB SERPL: 1.6 G/DL
ALP SERPL-CCNC: 64 U/L (ref 39–117)
ALT SERPL W P-5'-P-CCNC: 19 U/L (ref 1–33)
ANION GAP SERPL CALCULATED.3IONS-SCNC: 12.9 MMOL/L (ref 5–15)
AST SERPL-CCNC: 19 U/L (ref 1–32)
BASOPHILS # BLD AUTO: 0.06 10*3/MM3 (ref 0–0.2)
BASOPHILS NFR BLD AUTO: 0.4 % (ref 0–1.5)
BILIRUB SERPL-MCNC: 0.3 MG/DL (ref 0–1.2)
BILIRUB UR QL STRIP: NEGATIVE
BUN SERPL-MCNC: 8 MG/DL (ref 6–20)
BUN/CREAT SERPL: 14.5 (ref 7–25)
CALCIUM SPEC-SCNC: 9.4 MG/DL (ref 8.6–10.5)
CHLORIDE SERPL-SCNC: 103 MMOL/L (ref 98–107)
CLARITY UR: CLEAR
CO2 SERPL-SCNC: 25.1 MMOL/L (ref 22–29)
COLOR UR: YELLOW
CREAT SERPL-MCNC: 0.55 MG/DL (ref 0.57–1)
DEPRECATED RDW RBC AUTO: 38.9 FL (ref 37–54)
EOSINOPHIL # BLD AUTO: 0.06 10*3/MM3 (ref 0–0.4)
EOSINOPHIL NFR BLD AUTO: 0.4 % (ref 0.3–6.2)
ERYTHROCYTE [DISTWIDTH] IN BLOOD BY AUTOMATED COUNT: 12.5 % (ref 12.3–15.4)
GFR SERPL CREATININE-BSD FRML MDRD: 138 ML/MIN/1.73
GLOBULIN UR ELPH-MCNC: 3 GM/DL
GLUCOSE SERPL-MCNC: 94 MG/DL (ref 65–99)
GLUCOSE UR STRIP-MCNC: NEGATIVE MG/DL
HCG SERPL QL: NEGATIVE
HCT VFR BLD AUTO: 45.4 % (ref 34–46.6)
HGB BLD-MCNC: 15.4 G/DL (ref 12–15.9)
HGB UR QL STRIP.AUTO: NEGATIVE
IMM GRANULOCYTES # BLD AUTO: 0.1 10*3/MM3 (ref 0–0.05)
IMM GRANULOCYTES NFR BLD AUTO: 0.6 % (ref 0–0.5)
KETONES UR QL STRIP: NEGATIVE
LEUKOCYTE ESTERASE UR QL STRIP.AUTO: NEGATIVE
LIPASE SERPL-CCNC: 21 U/L (ref 13–60)
LYMPHOCYTES # BLD AUTO: 1.84 10*3/MM3 (ref 0.7–3.1)
LYMPHOCYTES NFR BLD AUTO: 11.7 % (ref 19.6–45.3)
MCH RBC QN AUTO: 29.4 PG (ref 26.6–33)
MCHC RBC AUTO-ENTMCNC: 33.9 G/DL (ref 31.5–35.7)
MCV RBC AUTO: 86.6 FL (ref 79–97)
MONOCYTES # BLD AUTO: 0.84 10*3/MM3 (ref 0.1–0.9)
MONOCYTES NFR BLD AUTO: 5.3 % (ref 5–12)
NEUTROPHILS NFR BLD AUTO: 12.82 10*3/MM3 (ref 1.7–7)
NEUTROPHILS NFR BLD AUTO: 81.6 % (ref 42.7–76)
NITRITE UR QL STRIP: NEGATIVE
NRBC BLD AUTO-RTO: 0 /100 WBC (ref 0–0.2)
PH UR STRIP.AUTO: 7 [PH] (ref 5–8)
PLATELET # BLD AUTO: 244 10*3/MM3 (ref 140–450)
PMV BLD AUTO: 10.5 FL (ref 6–12)
POTASSIUM SERPL-SCNC: 3.6 MMOL/L (ref 3.5–5.2)
PROCALCITONIN SERPL-MCNC: 0.03 NG/ML (ref 0–0.25)
PROT SERPL-MCNC: 7.9 G/DL (ref 6–8.5)
PROT UR QL STRIP: NEGATIVE
RBC # BLD AUTO: 5.24 10*6/MM3 (ref 3.77–5.28)
SODIUM SERPL-SCNC: 141 MMOL/L (ref 136–145)
SP GR UR STRIP: 1.01 (ref 1–1.03)
UROBILINOGEN UR QL STRIP: NORMAL
WBC # BLD AUTO: 15.72 10*3/MM3 (ref 3.4–10.8)

## 2020-12-23 PROCEDURE — 25010000002 IOPAMIDOL 61 % SOLUTION: Performed by: EMERGENCY MEDICINE

## 2020-12-23 PROCEDURE — 80053 COMPREHEN METABOLIC PANEL: CPT | Performed by: EMERGENCY MEDICINE

## 2020-12-23 PROCEDURE — 74177 CT ABD & PELVIS W/CONTRAST: CPT

## 2020-12-23 PROCEDURE — 83690 ASSAY OF LIPASE: CPT | Performed by: EMERGENCY MEDICINE

## 2020-12-23 PROCEDURE — 84703 CHORIONIC GONADOTROPIN ASSAY: CPT | Performed by: EMERGENCY MEDICINE

## 2020-12-23 PROCEDURE — 81003 URINALYSIS AUTO W/O SCOPE: CPT | Performed by: EMERGENCY MEDICINE

## 2020-12-23 PROCEDURE — 71045 X-RAY EXAM CHEST 1 VIEW: CPT

## 2020-12-23 PROCEDURE — 99284 EMERGENCY DEPT VISIT MOD MDM: CPT

## 2020-12-23 PROCEDURE — 85025 COMPLETE CBC W/AUTO DIFF WBC: CPT | Performed by: EMERGENCY MEDICINE

## 2020-12-23 PROCEDURE — 84145 PROCALCITONIN (PCT): CPT | Performed by: EMERGENCY MEDICINE

## 2020-12-23 RX ORDER — SODIUM CHLORIDE 0.9 % (FLUSH) 0.9 %
10 SYRINGE (ML) INJECTION AS NEEDED
Status: DISCONTINUED | OUTPATIENT
Start: 2020-12-23 | End: 2020-12-24 | Stop reason: HOSPADM

## 2020-12-23 RX ADMIN — IOPAMIDOL 85 ML: 612 INJECTION, SOLUTION INTRAVENOUS at 23:45

## 2020-12-23 RX ADMIN — SODIUM CHLORIDE 1000 ML: 9 INJECTION, SOLUTION INTRAVENOUS at 21:26

## 2020-12-24 VITALS
BODY MASS INDEX: 26.68 KG/M2 | DIASTOLIC BLOOD PRESSURE: 90 MMHG | SYSTOLIC BLOOD PRESSURE: 130 MMHG | TEMPERATURE: 96.9 F | WEIGHT: 170 LBS | HEART RATE: 78 BPM | OXYGEN SATURATION: 97 % | RESPIRATION RATE: 14 BRPM | HEIGHT: 67 IN

## 2020-12-24 NOTE — ED TRIAGE NOTES
"Pt to ER via POV with c/o vomiting \"for months\". Saw her MD yesterday and put on acid blockers. Pt in mask, staff in PPE.  "

## 2020-12-24 NOTE — ED PROVIDER NOTES
" EMERGENCY DEPARTMENT ENCOUNTER    Room Number:  33/33  Date of encounter:  12/24/2020  PCP: Guillermo Ambriz MD  Historian: Patient      HPI:  Chief Complaint: Vomiting  A complete HPI/ROS/PMH/PSH/SH/FH are unobtainable due to: Not applicable  Context: Debbie Bauman is a 20 y.o. female who presents to the ED c/o vomiting that is been occurring for several months.  She will vomit 2-3 times a week.  It will be very sporadic.  She says it leads been going on 4 to 5 months and possibly longer.  At times she will get some episodic midepigastric pain.  She will also have some burping and belching.  She denies any diarrhea.  The pain that she gets will come and go.  She describes it as \"a rock in my stomach\".  She has no urinary frequency or dysuria.  She is unaware of anything definitively that makes the pain worse or anything that makes it better.  She seen her primary care doctor for this.  She had a gallbladder ultrasound approximately 2 to 3 months ago which was unremarkable.  That actually was reviewed in epic.  She has not had a CT scan.  She states that she has taken 1 dose of the protein pump inhibitor that was prescribed yesterday via by her primary care doctor.  She denies any coughs or colds or fevers or chills.        Previous Episodes: Yes  Current Symptoms: See above currently does complain of some mild midepigastric pain    MEDICAL HISTORY REVIEWED  I read the note from her primary care doctor, Dr. Ambriz from yesterday.  I read the doctor's complete note.  I put a copy below of the patient's assessment and plan.  Pleasant 20-year-old female with false positive HIV serology, history of depression, menorrhagia with regular cycle, hypertension, who presents with the following:     Diagnoses and all orders for this visit:     1. Dyspepsia (Primary)  -     pantoprazole (PROTONIX) 40 MG EC tablet; Take 1 tablet by mouth Daily.  Dispense: 30 tablet; Refill: 0     Her symptom symptoms remain consistent with " dyspepsia and she did not try famotidine as previously recommended.  Abdominal ultrasound was unremarkable, IUD was demonstrated as in place, STI screening in August was normal.     Recommend she start PPI. We will have her in the office in 1 week for examination, pelvic examination the swab as well for vaginal discharge which may be contributing to her current symptoms.     You have chosen to receive care through a telephone visit. Do you consent to use a telephone visit for your medical care today? Yes     Spent approximately 20 minutes on the phone counseling the patient  PAST MEDICAL HISTORY  Active Ambulatory Problems     Diagnosis Date Noted   • Family history of diabetes mellitus 01/08/2018   • Heart palpitations 01/08/2018   • Menorrhagia with irregular cycle 03/19/2018   • Major depressive disorder 01/17/2020   • False positive serology for HIV 01/17/2020   • Cyst of left ovary 08/28/2020   • Abnormal menstruation 10/01/2020   • Fast heart beat 08/01/2020   • Vomiting 03/31/2020     Resolved Ambulatory Problems     Diagnosis Date Noted   • Inattention 02/23/2017   • Pain in radius 05/22/2017   • Increased thirst 01/08/2018   • Menstrual cycle problem 01/08/2018   • Essential hypertension 01/08/2018   • Dizziness 01/08/2018   • Chronic hypertension in pregnancy 11/05/2018   • Pregnancy 11/06/2018   • Pregnant 11/23/2018   • Normal vaginal delivery 12/07/2018   • Thrombocytopenia (CMS/HCC) 01/17/2020   • LFT elevation 01/17/2020     Past Medical History:   Diagnosis Date   • ADHD (attention deficit hyperactivity disorder)    • Chlamydia    • Depression    • Hypertension    • Major depression, recurrent (CMS/HCC) 1/17/2020   • Varicella          PAST SURGICAL HISTORY  Past Surgical History:   Procedure Laterality Date   • TONSILLECTOMY           FAMILY HISTORY  Family History   Problem Relation Age of Onset   • Heart attack Father    • Diabetes Father    • Hypertension Father    • Ovarian cancer Mother    •  Stroke Paternal Grandfather          SOCIAL HISTORY  Social History     Socioeconomic History   • Marital status:      Spouse name: Not on file   • Number of children: 0   • Years of education: Not on file   • Highest education level: Not on file   Tobacco Use   • Smoking status: Former Smoker     Packs/day: 0.10     Types: Cigarettes     Quit date: 3/28/2018     Years since quittin.7   • Smokeless tobacco: Never Used   Substance and Sexual Activity   • Alcohol use: Yes     Comment: occasionally   • Drug use: Yes     Types: Marijuana     Comment: occasionally   • Sexual activity: Yes     Partners: Male     Birth control/protection: I.U.D.         ALLERGIES  Bee venom        REVIEW OF SYSTEMS  Review of Systems     All systems reviewed and negative except for those discussed in HPI.       PHYSICAL EXAM    I have reviewed the triage vital signs and nursing notes.    ED Triage Vitals [200]   Temp Heart Rate Resp BP SpO2   96.2 °F (35.7 °C) 72 16 -- 96 %      Temp src Heart Rate Source Patient Position BP Location FiO2 (%)   Tympanic Monitor -- -- --       GENERAL: Female no acute distress.Vital signs on my initial evaluation reviewed and unremarkable  HENT: nares patent  Head/neck/ face are symmetric without gross deformity, signs of trauma, or swelling  EYES: no scleral icterus, no conjunctival pallor.  NECK: Supple, no meningismus  CV: regular rhythm, regular rate with intact distal pulses.  No murmur rub  RESPIRATORY: normal effort and no respiratory distress.  Clear to auscultation bilaterally  ABDOMEN: soft and very mild tenderness periumbilical and midepigastric region.  There is no guarding or rebound.  The tenderness is mainly subjective.  She states it only hurts if you press down hard.  Does not have pain without palpation.  She is negative for Caban sign.  She has normal bowel sounds.  MUSCULOSKELETAL: no deformity.  Intact distal pulses.  Moves all extremities without pain or  deformity.  NEURO: alert and appropriate, moves all extremities, follows commands.  Alert and oriented x3.  No focal motor or sensory changes.  SKIN: warm, dry    Vital signs and nursing notes reviewed.        LAB RESULTS  Recent Results (from the past 24 hour(s))   Urinalysis With Microscopic If Indicated (No Culture) - Urine, Clean Catch    Collection Time: 12/23/20  9:19 PM    Specimen: Urine, Clean Catch   Result Value Ref Range    Color, UA Yellow Yellow, Straw    Appearance, UA Clear Clear    pH, UA 7.0 5.0 - 8.0    Specific Gravity, UA 1.015 1.005 - 1.030    Glucose, UA Negative Negative    Ketones, UA Negative Negative    Bilirubin, UA Negative Negative    Blood, UA Negative Negative    Protein, UA Negative Negative    Leuk Esterase, UA Negative Negative    Nitrite, UA Negative Negative    Urobilinogen, UA 0.2 E.U./dL 0.2 - 1.0 E.U./dL   Comprehensive Metabolic Panel    Collection Time: 12/23/20  9:26 PM    Specimen: Blood   Result Value Ref Range    Glucose 94 65 - 99 mg/dL    BUN 8 6 - 20 mg/dL    Creatinine 0.55 (L) 0.57 - 1.00 mg/dL    Sodium 141 136 - 145 mmol/L    Potassium 3.6 3.5 - 5.2 mmol/L    Chloride 103 98 - 107 mmol/L    CO2 25.1 22.0 - 29.0 mmol/L    Calcium 9.4 8.6 - 10.5 mg/dL    Total Protein 7.9 6.0 - 8.5 g/dL    Albumin 4.90 3.50 - 5.20 g/dL    ALT (SGPT) 19 1 - 33 U/L    AST (SGOT) 19 1 - 32 U/L    Alkaline Phosphatase 64 39 - 117 U/L    Total Bilirubin 0.3 0.0 - 1.2 mg/dL    eGFR Non African Amer 138 >60 mL/min/1.73    Globulin 3.0 gm/dL    A/G Ratio 1.6 g/dL    BUN/Creatinine Ratio 14.5 7.0 - 25.0    Anion Gap 12.9 5.0 - 15.0 mmol/L   hCG, Serum, Qualitative    Collection Time: 12/23/20  9:26 PM    Specimen: Blood   Result Value Ref Range    HCG Qualitative Negative Negative   Lipase    Collection Time: 12/23/20  9:26 PM    Specimen: Blood   Result Value Ref Range    Lipase 21 13 - 60 U/L   CBC Auto Differential    Collection Time: 12/23/20  9:26 PM    Specimen: Blood   Result Value  Ref Range    WBC 15.72 (H) 3.40 - 10.80 10*3/mm3    RBC 5.24 3.77 - 5.28 10*6/mm3    Hemoglobin 15.4 12.0 - 15.9 g/dL    Hematocrit 45.4 34.0 - 46.6 %    MCV 86.6 79.0 - 97.0 fL    MCH 29.4 26.6 - 33.0 pg    MCHC 33.9 31.5 - 35.7 g/dL    RDW 12.5 12.3 - 15.4 %    RDW-SD 38.9 37.0 - 54.0 fl    MPV 10.5 6.0 - 12.0 fL    Platelets 244 140 - 450 10*3/mm3    Neutrophil % 81.6 (H) 42.7 - 76.0 %    Lymphocyte % 11.7 (L) 19.6 - 45.3 %    Monocyte % 5.3 5.0 - 12.0 %    Eosinophil % 0.4 0.3 - 6.2 %    Basophil % 0.4 0.0 - 1.5 %    Immature Grans % 0.6 (H) 0.0 - 0.5 %    Neutrophils, Absolute 12.82 (H) 1.70 - 7.00 10*3/mm3    Lymphocytes, Absolute 1.84 0.70 - 3.10 10*3/mm3    Monocytes, Absolute 0.84 0.10 - 0.90 10*3/mm3    Eosinophils, Absolute 0.06 0.00 - 0.40 10*3/mm3    Basophils, Absolute 0.06 0.00 - 0.20 10*3/mm3    Immature Grans, Absolute 0.10 (H) 0.00 - 0.05 10*3/mm3    nRBC 0.0 0.0 - 0.2 /100 WBC   Procalcitonin    Collection Time: 12/23/20  9:26 PM    Specimen: Blood   Result Value Ref Range    Procalcitonin 0.03 0.00 - 0.25 ng/mL       Ordered the above labs and independently reviewed the results.        RADIOLOGY  Ct Abdomen Pelvis With Contrast    Result Date: 12/24/2020  CT ABDOMEN AND PELVIS WITH CONTRAST:  HISTORY: Abdominal pain and vomiting  TECHNIQUE:  Multiple contiguous helical CT images of the abdomen and pelvis were obtained following the uneventful administration of intravenous contrast. Multiplanar reformatted images were reconstructed from the helical source data. Radiation dose reduction techniques were utilized, including automated exposure control and exposure modulation based on body size.   COMPARISON:  None  FINDINGS:  Lower chest: The lung bases are clear. No pleural effusions.  Vasculature: Normal caliber abdominal aorta and branch vessels. Major venous structures are unremarkable.  Liver: Unremarkable.  Gallbladder and Bile Ducts: Normal appearance. No gallstones. No biliary ductal  dilatation.  Spleen: Unremarkable.  Pancreas: Unremarkable.  Adrenals: Unremarkable.  Kidneys/Ureters/Bladder: Normal kidneys. No calculi or hydronephrosis. Unremarkable ureters and bladder.  GI Tract/Mesentery:The stomach is unremarkable. No abnormally dilated small or large bowel loops. No definite bowel wall thickening. The appendix is normal. No free or loculated fluid collections. No intraperitoneal free air.  Lymphadenopathy: No retroperitoneal, mesenteric or pelvic lymphadenopathy.  Pelvic organs: IUD in the uterus appears centrally placed.  Peripheral Soft Tissues: No soft tissue mass or fluid collection.  No ventral, inguinal or femoral hernia.   Osseous Structures: No acute osseous abnormality. No suspicious lytic or blastic lesions.       No acute intraabdominal or pelvic pathology.      This report was finalized on 12/24/2020 12:50 AM by Dr. Benjamin Cunha M.D.      Xr Chest 1 View    Result Date: 12/24/2020  CHEST X-RAY 1 View:  HISTORY: Pain and vomiting, leukocytosis.   COMPARISON:   None.  FINDINGS:  The cardiomediastinal silhouette is normal. The lungs are clear. No pleural effusion. No pneumothorax.  No acute osseous abnormality.       No acute cardiopulmonary disease.  This report was finalized on 12/24/2020 12:42 AM by Dr. Benjamin Cunha M.D.        I ordered the above noted radiological studies. Reviewed by me and discussed with radiologist.  See dictation for official radiology interpretation.      PROCEDURES    Procedures      MEDICATIONS GIVEN IN ER    Medications   sodium chloride 0.9 % flush 10 mL (has no administration in time range)   sodium chloride 0.9 % bolus 1,000 mL (0 mL Intravenous Stopped 12/23/20 9696)   iopamidol (ISOVUE-300) 61 % injection 100 mL (85 mL Intravenous Given by Other 12/23/20 4593)         PROGRESS, DATA ANALYSIS, CONSULTS, AND MEDICAL DECISION MAKING    We will check some lab work on her.  She has a benign abdominal exam.  We will give her some IV fluids.  This is  "likely GI in etiology.  She is taking a protein pump inhibitor 1 time.  She refused to take the H2 blocker that was previously prescribed because she was concerned about potential side effects.  All questions answered  We are currently under a pandemic from the COVID19 infection.  The patient presented to the emergency department by ambulance or personal vehicle. I followed the current protocols required by Infection Control at [facility] in my evaluation and treatment of the patient. The patient was wearing a face mask during my evaluation and throughout my encounter. During my whole encounter with this patient I used appropriate personal protective equipment.  This equipment consisted of eye protection, facemask, gown, and gloves.  I applied this equipment before entering the room.      All labs have been independently reviewed by me.  All radiology studies have been reviewed by me and discussed with radiologist dictating the report.   EKG's independently viewed and interpreted by me.  Discussion below represents my analysis of pertinent findings related to patient's condition, differential diagnosis, treatment plan and final disposition.      ED Course as of Dec 24 0055   Wed Dec 23, 2020   2229 WBC(!): 15.72 [MM]   Thu Dec 24, 2020   0052 I reevaluated the patient.  Patient's abdominal exam is benign.  She does not have any tenderness.  Patient states that she smokes marijuana \"a lot\".  She smokes daily.  I think is very likely and possible she has her symptoms being triggered by her THC abuse.  Instructed her to limit and to try to stop her smoking marijuana.  All questions answered.    [MM]   0054 I think the leukocytosis is likely reactive to vomiting.  She has had no vomiting here and has been stable in no distress.   WBC(!): 15.72 [MM]      ED Course User Index  [MM] Cory Lyn MD       AS OF 00:55 EST VITALS:    BP - 122/88  HR - 70  TEMP - 96.9 °F (36.1 °C) (Tympanic)  02 SATS - " 97%        DIAGNOSIS  Final diagnoses:   Epigastric pain episodic and has been going on for several months   Non-intractable vomiting with nausea, unspecified vomiting type episodic has been occurring for several months.         DISPOSITION  DISCHARGE    Patient discharged in stable condition.    Reviewed implications of results, diagnosis, meds, responsibility to follow up, warning signs and symptoms of possible worsening, potential complications and reasons to return to ER, including worsening of symptoms, not able to tolerate liquids, fever, shortness of breath, any concerns..    Patient/Family voiced understanding of above instructions.    Discussed plan for discharge, as there is no emergent indication for admission. Pt/family is agreeable and understands need for follow up and repeat testing.  Pt is aware that discharge does not mean that nothing is wrong but it indicates no emergency is present that requires admission and they must continue care with follow-up as given below or physician of their choice.     FOLLOW-UP  Guillermo Ambriz MD  [address] [place] [address] [phone]    In 3 days  Return if pain worsens not able to tolerate liquids, If symptoms worsen, shortness of breath, fever, any concerns         Medication List      No changes were made to your prescriptions during this visit.                  Cory Lyn MD  12/24/20 005

## 2020-12-24 NOTE — DISCHARGE INSTRUCTIONS
Make sure you continue the medicines that was prescribed by your primary care doctor yesterday.  Eat small meals.  No fatty foods, processed foods, alcohol, tobacco, or large meals.  As we discussed try your best to stop smoking marijuana.  If you are unable to stop limit the amount of marijuana that you smoke.

## 2021-01-18 DIAGNOSIS — R10.13 DYSPEPSIA: ICD-10-CM

## 2021-01-18 RX ORDER — PANTOPRAZOLE SODIUM 40 MG/1
TABLET, DELAYED RELEASE ORAL
Qty: 60 TABLET | Refills: 0 | Status: SHIPPED | OUTPATIENT
Start: 2021-01-18 | End: 2021-03-12 | Stop reason: SDUPTHER

## 2021-03-08 ENCOUNTER — TELEPHONE (OUTPATIENT)
Dept: OBSTETRICS AND GYNECOLOGY | Facility: CLINIC | Age: 21
End: 2021-03-08

## 2021-03-09 ENCOUNTER — OFFICE VISIT (OUTPATIENT)
Dept: OBSTETRICS AND GYNECOLOGY | Facility: CLINIC | Age: 21
End: 2021-03-09

## 2021-03-09 VITALS
SYSTOLIC BLOOD PRESSURE: 146 MMHG | HEIGHT: 67 IN | WEIGHT: 178 LBS | DIASTOLIC BLOOD PRESSURE: 86 MMHG | BODY MASS INDEX: 27.94 KG/M2

## 2021-03-09 DIAGNOSIS — N93.8 DYSFUNCTIONAL UTERINE BLEEDING: ICD-10-CM

## 2021-03-09 DIAGNOSIS — Z01.419 VISIT FOR GYNECOLOGIC EXAMINATION: Primary | ICD-10-CM

## 2021-03-09 DIAGNOSIS — R30.0 DYSURIA: ICD-10-CM

## 2021-03-09 LAB
BILIRUB BLD-MCNC: NEGATIVE MG/DL
GLUCOSE UR STRIP-MCNC: NEGATIVE MG/DL
KETONES UR QL: NEGATIVE
LEUKOCYTE EST, POC: ABNORMAL
NITRITE UR-MCNC: NEGATIVE MG/ML
PH UR: 6 [PH] (ref 5–8)
PROT UR STRIP-MCNC: NEGATIVE MG/DL
RBC # UR STRIP: ABNORMAL /UL
SP GR UR: 1.02 (ref 1–1.03)
UROBILINOGEN UR QL: NORMAL

## 2021-03-09 PROCEDURE — 99395 PREV VISIT EST AGE 18-39: CPT | Performed by: OBSTETRICS & GYNECOLOGY

## 2021-03-09 NOTE — PROGRESS NOTES
[facility]  [address], [address]  [place], [place] [address]  Phone: [phone] / Fax:  [phone]      2021    [address] [place] [id number]    Guillermo Ambriz MD    Chief Complaint   Patient presents with   • Gynecologic Exam     Annual Exam, last pap 214-20 NL. Patient has Kristin IUD in place since 19. Patient c/o random spotting x6  monhts, she also has spotting with intercourse. She is wanitng to discuss possible Nexplanon due to spotting. Patient also c/o pain with urination that began today, CC U/A Trace of Blood and Small Leukocytes.       Debbie Bauman is here for annual gynecologic exam.  HPI - Patient with regular cycles that vary in length and amount of bleeding.  In addition, she has one or two spotting episodes each month.  IUD is in place for 2 years.  She is considering switching to Nexplanon.  Patient also with burning/pain with urination over past 24 hours.  She has not had a work up.    Past Medical History:   Diagnosis Date   • ADHD (attention deficit hyperactivity disorder)    • Chlamydia        • Chronic hypertension in pregnancy 2018   • Depression    • Dizziness 2018   • Essential hypertension 2018   • Hypertension    • Inattention 2017   • Increased thirst 2018   • Major depression, recurrent (CMS/HCC) 2020   • Menstrual cycle problem 2018   • Pain in radius 2017   • Varicella        Past Surgical History:   Procedure Laterality Date   • TONSILLECTOMY     • WISDOM TOOTH EXTRACTION         Allergies   Allergen Reactions   • Bee Venom Swelling       Social History     Socioeconomic History   • Marital status:      Spouse name: Not on file   • Number of children: 0   • Years of education: Not on file   • Highest education level: Not on file   Tobacco Use   • Smoking status: Former Smoker     Packs/day: 0.10     Types: Cigarettes     Quit date: 3/28/2018     Years since quittin.9   • Smokeless tobacco:  "Never Used   Substance and Sexual Activity   • Alcohol use: Yes     Comment: occasionally   • Drug use: Yes     Types: Marijuana     Comment: occasionally   • Sexual activity: Yes     Partners: Male     Birth control/protection: I.U.D.       Family History   Problem Relation Age of Onset   • Heart attack Father    • Diabetes Father    • Hypertension Father    • Ovarian cancer Mother    • Stroke Paternal Grandfather        No LMP recorded. (Menstrual status: Other).    OB History        2    Para   1    Term   1            AB   1    Living   1       SAB   1    TAB        Ectopic        Molar        Multiple   0    Live Births   1                Vitals:    21 1335   BP: 146/86   Weight: 80.7 kg (178 lb)   Height: 170.2 cm (67\")       Physical Exam  Constitutional:       Appearance: She is well-developed.   Genitourinary:      Pelvic exam was performed with patient in the lithotomy position.      Vulva, urethra, bladder, vagina and uterus normal.      No cervical motion tenderness or lesion.      IUD strings visualized.      Right adnexa not tender or full.      Left adnexa not tender or full.   HENT:      Right Ear: External ear normal.      Left Ear: External ear normal.      Nose: Nose normal.   Eyes:      Conjunctiva/sclera: Conjunctivae normal.   Neck:      Thyroid: No thyromegaly.   Cardiovascular:      Rate and Rhythm: Normal rate and regular rhythm.      Heart sounds: Normal heart sounds.   Pulmonary:      Effort: Pulmonary effort is normal.      Breath sounds: No stridor. No wheezing.   Chest:      Breasts:         Right: No mass or nipple discharge.         Left: No mass or nipple discharge.   Abdominal:      Palpations: Abdomen is soft. There is no mass.      Tenderness: There is no guarding or rebound.   Musculoskeletal:         General: Normal range of motion.      Cervical back: Normal range of motion and neck supple.   Neurological:      Mental Status: She is alert.      Coordination: " Coordination normal.   Skin:     General: Skin is warm and dry.   Psychiatric:         Mood and Affect: Mood normal.         Behavior: Behavior normal.         Thought Content: Thought content normal.         Judgment: Judgment normal.   Vitals reviewed.         Diagnoses and all orders for this visit:    1. Visit for gynecologic examination (Primary)        -     Discussed importance of regular screening and breast awareness.        -     Discussed alternative contraception.  Patient to leave IUD in for another year.  2. Dysfunctional uterine bleeding  -     NuSwab VG+ - Swab, Vagina  -     Likely related to progestational effects of IUD.  Reassurance given.  3. Dysuria  -     Urine Culture - Urine, Urine, Clean Catch  -     Await cultures.      Anthony Esteves MD

## 2021-03-11 LAB
BACTERIA UR CULT: NORMAL
BACTERIA UR CULT: NORMAL

## 2021-03-12 ENCOUNTER — OFFICE VISIT (OUTPATIENT)
Dept: INTERNAL MEDICINE | Facility: CLINIC | Age: 21
End: 2021-03-12

## 2021-03-12 ENCOUNTER — TELEPHONE (OUTPATIENT)
Dept: OBSTETRICS AND GYNECOLOGY | Facility: CLINIC | Age: 21
End: 2021-03-12

## 2021-03-12 VITALS — DIASTOLIC BLOOD PRESSURE: 82 MMHG | HEART RATE: 86 BPM | OXYGEN SATURATION: 99 % | SYSTOLIC BLOOD PRESSURE: 122 MMHG

## 2021-03-12 DIAGNOSIS — F19.10 POLYSUBSTANCE ABUSE (HCC): ICD-10-CM

## 2021-03-12 DIAGNOSIS — J30.1 SEASONAL ALLERGIC RHINITIS DUE TO POLLEN: ICD-10-CM

## 2021-03-12 DIAGNOSIS — F32.1 MAJOR DEPRESSIVE DISORDER, SINGLE EPISODE, MODERATE (HCC): Primary | ICD-10-CM

## 2021-03-12 DIAGNOSIS — R10.13 DYSPEPSIA: ICD-10-CM

## 2021-03-12 PROBLEM — R00.2 HEART PALPITATIONS: Status: RESOLVED | Noted: 2018-01-08 | Resolved: 2021-03-12

## 2021-03-12 PROBLEM — N92.6 ABNORMAL MENSTRUATION: Status: RESOLVED | Noted: 2020-10-01 | Resolved: 2021-03-12

## 2021-03-12 PROBLEM — R00.0 FAST HEART BEAT: Status: RESOLVED | Noted: 2020-08-01 | Resolved: 2021-03-12

## 2021-03-12 PROBLEM — R11.10 VOMITING: Status: RESOLVED | Noted: 2020-03-31 | Resolved: 2021-03-12

## 2021-03-12 LAB
A VAGINAE DNA VAG QL NAA+PROBE: ABNORMAL SCORE
BVAB2 DNA VAG QL NAA+PROBE: ABNORMAL SCORE
C ALBICANS DNA VAG QL NAA+PROBE: NEGATIVE
C GLABRATA DNA VAG QL NAA+PROBE: NEGATIVE
C TRACH DNA VAG QL NAA+PROBE: NEGATIVE
MEGA1 DNA VAG QL NAA+PROBE: ABNORMAL SCORE
N GONORRHOEA DNA VAG QL NAA+PROBE: NEGATIVE
T VAGINALIS DNA VAG QL NAA+PROBE: NEGATIVE

## 2021-03-12 PROCEDURE — 99214 OFFICE O/P EST MOD 30 MIN: CPT | Performed by: FAMILY MEDICINE

## 2021-03-12 RX ORDER — FLUTICASONE PROPIONATE 50 MCG
SPRAY, SUSPENSION (ML) NASAL
Qty: 9.9 ML | Refills: 3 | Status: SHIPPED | OUTPATIENT
Start: 2021-03-12 | End: 2021-07-08

## 2021-03-12 RX ORDER — PANTOPRAZOLE SODIUM 40 MG/1
40 TABLET, DELAYED RELEASE ORAL DAILY PRN
Qty: 60 TABLET | Refills: 0
Start: 2021-03-12 | End: 2021-07-08

## 2021-03-12 RX ORDER — METRONIDAZOLE 500 MG/1
500 TABLET ORAL 2 TIMES DAILY
Qty: 14 TABLET | Refills: 0 | Status: SHIPPED | OUTPATIENT
Start: 2021-03-12 | End: 2021-03-19

## 2021-03-12 RX ORDER — SERTRALINE HYDROCHLORIDE 25 MG/1
TABLET, FILM COATED ORAL
Status: CANCELLED | OUTPATIENT
Start: 2021-03-12
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
## 2021-03-12 NOTE — TELEPHONE ENCOUNTER
[place]    Please let her know that she has a bacterial infection.  I called in antibiotics.    Thanks    Danielito

## 2021-04-16 ENCOUNTER — BULK ORDERING (OUTPATIENT)
Dept: CASE MANAGEMENT | Facility: OTHER | Age: 21
End: 2021-04-16

## 2021-04-16 DIAGNOSIS — Z23 IMMUNIZATION DUE: ICD-10-CM

## 2021-05-28 ENCOUNTER — OFFICE VISIT (OUTPATIENT)
Dept: INTERNAL MEDICINE | Facility: CLINIC | Age: 21
End: 2021-05-28

## 2021-05-28 VITALS — HEART RATE: 89 BPM | SYSTOLIC BLOOD PRESSURE: 128 MMHG | DIASTOLIC BLOOD PRESSURE: 82 MMHG | OXYGEN SATURATION: 99 %

## 2021-05-28 DIAGNOSIS — R05.3 PERSISTENT COUGH: ICD-10-CM

## 2021-05-28 DIAGNOSIS — B34.9 VIRAL SYNDROME: Primary | ICD-10-CM

## 2021-05-28 PROCEDURE — 99213 OFFICE O/P EST LOW 20 MIN: CPT | Performed by: FAMILY MEDICINE

## 2021-05-28 RX ORDER — BENZONATATE 100 MG/1
100 CAPSULE ORAL 3 TIMES DAILY PRN
Qty: 30 CAPSULE | Refills: 1 | Status: SHIPPED | OUTPATIENT
Start: 2021-05-28 | End: 2021-07-08

## 2021-05-28 RX ORDER — ONDANSETRON 4 MG/1
4 TABLET, FILM COATED ORAL EVERY 8 HOURS PRN
Qty: 30 TABLET | Refills: 0 | Status: SHIPPED | OUTPATIENT
Start: 2021-05-28 | End: 2021-07-08

## 2021-05-28 NOTE — PROGRESS NOTES
Date of Encounter: 2021  Patient: Debbie Bauman,  2000    Subjective   History of Presenting Illness  Chief complaint: Cough    6-day history of nonproductive cough, nasal congestion, sore throat, loss of smell, subjective fever, chills, fatigue, myalgias, nausea, reduced appetite, vomiting, and diarrhea.  Son recently diagnosed with parainfluenza virus, COVID-19 test was negative.  Patient denies shortness of breath, wheezing, chest pain, and abdominal pain.  Using Robitussin for cough and Dramamine for nausea.  She is tolerating p.o. and staying hydrated. She feels her symptoms are improving somewhat.    The following portions of the patient's history were reviewed and updated as appropriate: allergies, current medications, past family history, past medical history, past social history, past surgical history and problem list.    Patient Active Problem List   Diagnosis   • Family history of diabetes mellitus   • Menorrhagia with irregular cycle   • Major depressive disorder   • False positive serology for HIV   • Cyst of left ovary   • Polysubstance abuse (CMS/[facility])   • Seasonal allergic rhinitis due to pollen   • Dyspepsia     Past Medical History:   Diagnosis Date   • ADHD (attention deficit hyperactivity disorder)    • Chlamydia        • Chronic hypertension in pregnancy 2018   • Depression    • Dizziness 2018   • Essential hypertension 2018   • Heart palpitations 2018   • Hypertension    • Inattention 2017   • Increased thirst 2018   • Major depression, recurrent (CMS/HCC) 2020   • Menstrual cycle problem 2018   • Pain in radius 2017   • Varicella      Past Surgical History:   Procedure Laterality Date   • TONSILLECTOMY     • WISDOM TOOTH EXTRACTION       Family History   Problem Relation Age of Onset   • Heart attack Father    • Diabetes Father    • Hypertension Father    • Ovarian cancer Mother    • Stroke Paternal Grandfather        Current  Outpatient Medications:   •  fluticasone (FLONASE) 50 MCG/ACT nasal spray, Use two sprays in each nostril daily for congestion, Disp: 9.9 mL, Rfl: 3  •  Levonorgestrel (ANITHA) 13.5 MG intrauterine device IUD, 1 each by Intrauterine route 1 (One) Time., Disp: , Rfl:   •  pantoprazole (PROTONIX) 40 MG EC tablet, Take 1 tablet by mouth Daily As Needed (heartburn)., Disp: 60 tablet, Rfl: 0  •  sertraline (Zoloft) 50 MG tablet, Take 1 tab PO QD for mood, Disp: 60 tablet, Rfl: 1  •  benzonatate (Tessalon Perles) 100 MG capsule, Take 1 capsule by mouth 3 (Three) Times a Day As Needed for Cough., Disp: 30 capsule, Rfl: 1  •  ondansetron (Zofran) 4 MG tablet, Take 1 tablet by mouth Every 8 (Eight) Hours As Needed for Nausea or Vomiting., Disp: 30 tablet, Rfl: 0  Allergies   Allergen Reactions   • Bee Venom Swelling     Social History     Tobacco Use   • Smoking status: Former Smoker     Packs/day: 0.10     Types: Cigarettes     Quit date: 3/28/2018     Years since quitting: 3.1   • Smokeless tobacco: Never Used   Substance Use Topics   • Alcohol use: Yes     Comment: occasionally   • Drug use: Yes     Types: Marijuana     Comment: occasionally          Objective   Physical Exam  Vitals:    05/28/21 1049   BP: 128/82   Pulse: 89   SpO2: 99%     There is no height or weight on file to calculate BMI.  Temperature 98.1 °F    Constitutional: NAD.  Eyes: EOMI. PERRLA. Normal conjunctiva.  Ear, nose, mouth, throat: Postnasal drip.  Clear rhinorrhea. Normal external ear canals and TMs bilaterally.  Cardiovascular: RRR. No murmurs. No LE edema b/l. Radial pulses 2+ bilaterally.  Pulmonary: CTA b/l. Good effort.  No wheezing.  Integumentary: No rashes or wounds on face or upper extremities.  Somewhat diaphoretic, warm to the touch.  Lymphatic: No anterior cervical lymphadenopathy.  Endocrine: No thyromegaly or palpable thyroid nodules.  Psychiatric: Normal affect. Normal thought content.  Gastrointestinal: Nondistended. No  hepatosplenomegaly.  Mild epigastric and left lower quadrant discomfort     Assessment/Plan   Assessment and Plan  Pleasant 20-year-old female with major depressive disorder, polysubstance use disorder, menorrhagia with irregular cycle, dyspepsia, who presents with the following:    Diagnoses and all orders for this visit:    1. Viral syndrome (Primary)  -     ondansetron (Zofran) 4 MG tablet; Take 1 tablet by mouth Every 8 (Eight) Hours As Needed for Nausea or Vomiting.  Dispense: 30 tablet; Refill: 0  -     benzonatate (Tessalon Perles) 100 MG capsule; Take 1 capsule by mouth 3 (Three) Times a Day As Needed for Cough.  Dispense: 30 capsule; Refill: 1    2. Persistent cough  -     COVID-19,[facility] ROUTINE, NP/OP SWAB IN TRANSPORT MEDIA OR ESWAB 72 HR TAT - Swab, Oropharynx    Symptoms consistent with viral syndrome, no focal bacterial infection identified.  Recommend symptomatic management with anti-inflammatories, medications as above, and watchful waiting.  Discussed reasons to return for further evaluation or to consider antibiotic.  Await COVID-19 testing but child does have parainfluenza virus which is also consistent with symptoms.    Guillermo Ambriz MD  Family Medicine  O: [phone]  C: [phone]    Disclaimer: Parts of this note were dictated by speech recognition. Minor errors in transcription may be present. Please call if questions.

## 2021-05-29 LAB
LABCORP SARS-COV-2, NAA 2 DAY TAT: NORMAL
SARS-COV-2 RNA RESP QL NAA+PROBE: NOT DETECTED

## 2021-06-24 ENCOUNTER — TELEPHONE (OUTPATIENT)
Dept: INTERNAL MEDICINE | Facility: CLINIC | Age: 21
End: 2021-06-24

## 2021-06-24 NOTE — TELEPHONE ENCOUNTER
Caller: Debbie Bauman    Relationship: Self    Best call back number: [phone]*    What is the best time to reach you: ANYTIME    Who are you requesting to speak with (clinical staff, provider,  specific staff member): CLINICAL STAFF MEMBER    What was the call regarding: PATIENT CALLED AND STATED THAT SHE HAS A RASH THAT STARTED 4 DAYS AGO, LITTLE RED BUMPS ON STOMACH, HIPS, BACK, CHEST. THE PATIENT ALSO STATED THAT LAST WEEK SHE STARTED HER PERIOD AND IT WAS LIGHT AND ONLY LASTED 3 DAYS. THE PATIENT STATES THAT SHE STARTED A LIGHT PERIOD AGAIN THIS WEEK, AND HAS ALSO NOTICED RED COLORED URINE. THE PATIENT ALSO STATES THAT SHE HAD A TEMPERATURE OF 99.2 THIS PAST Sunday. THE PATIENT STATES THAT SHE HAD BODY ACHES WHEN HER TEMPERATURE WAS UP, AND CURRENTLY SHE ONLY ACHES IN BACK OF HER NECK. THE PATIENT STATED THAT SHE IS 2 HOURS AWAY, AND PREFERRED TO LEAVE A MESSAGE FOR A CALLBACK TO ADVISE HER OF WHAT SHE NEEDS TO DO.    Do you require a callback: YES

## 2021-06-24 NOTE — TELEPHONE ENCOUNTER
I advised pt to go to [facility] or ER today for evaluation, considering the numerous complaints, and some of them being quite concerning. Pt verbalized understanding, agreement.

## 2021-07-08 ENCOUNTER — OFFICE VISIT (OUTPATIENT)
Dept: INTERNAL MEDICINE | Facility: CLINIC | Age: 21
End: 2021-07-08

## 2021-07-08 VITALS
TEMPERATURE: 97.5 F | SYSTOLIC BLOOD PRESSURE: 118 MMHG | HEART RATE: 94 BPM | DIASTOLIC BLOOD PRESSURE: 74 MMHG | OXYGEN SATURATION: 100 %

## 2021-07-08 DIAGNOSIS — B08.4 HAND, FOOT AND MOUTH DISEASE: Primary | ICD-10-CM

## 2021-07-08 PROCEDURE — 99213 OFFICE O/P EST LOW 20 MIN: CPT | Performed by: FAMILY MEDICINE

## 2021-12-02 ENCOUNTER — TELEPHONE (OUTPATIENT)
Dept: OBSTETRICS AND GYNECOLOGY | Facility: CLINIC | Age: 21
End: 2021-12-02

## 2021-12-28 ENCOUNTER — OFFICE VISIT (OUTPATIENT)
Dept: OBSTETRICS AND GYNECOLOGY | Facility: CLINIC | Age: 21
End: 2021-12-28

## 2021-12-28 VITALS
SYSTOLIC BLOOD PRESSURE: 139 MMHG | HEIGHT: 67 IN | BODY MASS INDEX: 25.58 KG/M2 | DIASTOLIC BLOOD PRESSURE: 89 MMHG | WEIGHT: 163 LBS

## 2021-12-28 DIAGNOSIS — Z30.09 COUNSELING FOR BIRTH CONTROL REGARDING INTRAUTERINE DEVICE (IUD): ICD-10-CM

## 2021-12-28 DIAGNOSIS — Z11.3 SCREENING FOR STD (SEXUALLY TRANSMITTED DISEASE): Primary | ICD-10-CM

## 2021-12-28 DIAGNOSIS — N64.4 BREAST PAIN, RIGHT: ICD-10-CM

## 2021-12-28 PROCEDURE — 99213 OFFICE O/P EST LOW 20 MIN: CPT | Performed by: OBSTETRICS & GYNECOLOGY

## 2021-12-28 NOTE — PROGRESS NOTES
Subjective   Debbie Bauman is a 21 y.o. female.     Cc:  STD concern, breast pain, IUD replacement    History of Present Illness - Patient is a 21 year old female who presents for 3 issues.  1)  STD testing.  Patient with new partner.  She reports some unusual spotting and mild discomfort with intercourse.  She requests STD screening.  2)  Right breast pain.  Patient with intermittent mild breast pain on right side.  No left sided pain.  No lumps noted.  No leakage from breast.  No family history of breast cancer.  Patient consumes large amounts of coffee each day.  3)  IUD change.  Patient with Anitha IUD.  She is interested in replacement.    The following portions of the patient's history were reviewed and updated as appropriate:   She  has a past medical history of ADHD (attention deficit hyperactivity disorder), Chlamydia, Chronic hypertension in pregnancy (11/5/2018), Depression, Dizziness (1/8/2018), Essential hypertension (1/8/2018), Heart palpitations (1/8/2018), Hypertension, Inattention (2/23/2017), Increased thirst (1/8/2018), Major depression, recurrent (HCC) (1/17/2020), Menstrual cycle problem (1/8/2018), Pain in radius (5/22/2017), and Varicella.  She  reports that she quit smoking about 3 years ago. Her smoking use included cigarettes. She smoked 0.10 packs per day. She has never used smokeless tobacco. She reports current alcohol use. She reports current drug use. Drug: Marijuana.  Current Outpatient Medications   Medication Sig Dispense Refill   • Levonorgestrel (ANITHA) 13.5 MG intrauterine device IUD 1 each by Intrauterine route 1 (One) Time.       No current facility-administered medications for this visit.     She is allergic to bee venom..    Review of Systems   Constitutional: Negative for chills and fever.   Genitourinary: Negative for menstrual problem and pelvic pain.       Objective   Physical Exam  Vitals reviewed. Exam conducted with a chaperone present.   Constitutional:        Appearance: Normal appearance.   Chest:   Breasts:      Right: No swelling, bleeding, inverted nipple, mass, nipple discharge, skin change or tenderness.      Left: No swelling, bleeding, inverted nipple, mass, nipple discharge, skin change or tenderness.       Genitourinary:     General: Normal vulva.      Exam position: Lithotomy position.      Labia:         Right: No rash or tenderness.         Left: No rash or tenderness.       Urethra: No prolapse.      Vagina: Normal.      Cervix: Normal.      Comments: IUD strings noted  Neurological:      Mental Status: She is alert.   Psychiatric:         Mood and Affect: Mood normal.         Behavior: Behavior normal.         Thought Content: Thought content normal.         Judgment: Judgment normal.         Assessment/Plan   Diagnoses and all orders for this visit:    1. Screening for STD (sexually transmitted disease) (Primary)  -     Chlamydia trachomatis, Neisseria gonorrhoeae, Trichomonas vaginalis, PCR - Swab, Vagina  -     HIV-1 / O / 2 Ag / Antibody 4th Generation  -     RPR  -     Discussed full work up.  Await testing.    2. Breast pain, right        -     Breast exam normal.  Discussed reducing caffeine.  Discussed pain related to undergarments.    3. Counseling for birth control regarding intrauterine device (IUD)        -     Discussed replacement of IUD in 2 to 3 months.         Anthony Esteves MD

## 2021-12-29 ENCOUNTER — TELEPHONE (OUTPATIENT)
Dept: OBSTETRICS AND GYNECOLOGY | Facility: CLINIC | Age: 21
End: 2021-12-29

## 2021-12-29 DIAGNOSIS — A52.8 LATE LATENT SYPHILIS: Primary | ICD-10-CM

## 2021-12-29 LAB
HIV 1+2 AB+HIV1 P24 AG SERPL QL IA: NON REACTIVE
RPR SER QL: REACTIVE
RPR SER-TITR: ABNORMAL {TITER}

## 2021-12-30 LAB
C TRACH RRNA SPEC QL NAA+PROBE: NEGATIVE
N GONORRHOEA RRNA SPEC QL NAA+PROBE: NEGATIVE
SPECIMEN STATUS: NORMAL
T PALLIDUM AB SER QL IF: REACTIVE
T VAGINALIS DNA SPEC QL NAA+PROBE: NEGATIVE

## 2022-01-03 PROBLEM — A52.8 LATE LATENT SYPHILIS: Status: ACTIVE | Noted: 2022-01-03

## 2022-01-03 RX ORDER — SODIUM CHLORIDE 9 MG/ML
250 INJECTION, SOLUTION INTRAVENOUS ONCE
Status: CANCELLED | OUTPATIENT
Start: 2022-01-03

## 2022-01-03 NOTE — TELEPHONE ENCOUNTER
Dr. Doyle-    Her FTA is reactive. I did let the patient know. Just needing next step.    Thanks,  Karol

## 2022-01-03 NOTE — TELEPHONE ENCOUNTER
Danielito Valdovinos pt called about results. Because I was not sure of exactly how to advise, would you please call her back?  Thank you,  Tiff

## 2022-01-03 NOTE — TELEPHONE ENCOUNTER
Karol,     I have entered her orders. She needs to be set up to go see the infusion center weekly for the next three weeks. Her last negative test was August, 2020. 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 The treatment is PNC G injections weekly for 3 weeks. If she misses a shot by > 14 days from her last shot (over one week from scheduled) she must restart the therapy.     She will need to follow up with Dr. Esteves for RPR at 6, 12 and 24 months (drop of two titers (four fold decrease)) is considered effective treatment.     Please let her know and have Lilia or someone help arrange the injections. I placed the orders.     Thanks,   Dr. Doyle

## 2022-01-03 NOTE — TELEPHONE ENCOUNTER
Appt for PNC G scheduled for:    1/05/2022 @ 3:00  1/12/2022 @ 2:00  1/19/2022 @ 3:30   Ambulatory Care Unit @ [facility]   Arrive 15 min early.    Pt made aware. She is aware to follow up with Dr. Esteves in 6, 12 and 24  Months.     LAW- ETHEL.    Karol

## 2022-01-04 ENCOUNTER — TELEPHONE (OUTPATIENT)
Dept: OBSTETRICS AND GYNECOLOGY | Facility: CLINIC | Age: 22
End: 2022-01-04

## 2022-01-05 ENCOUNTER — HOSPITAL ENCOUNTER (OUTPATIENT)
Dept: INFUSION THERAPY | Facility: HOSPITAL | Age: 22
Discharge: HOME | End: 2022-01-05
Admitting: OBSTETRICS & GYNECOLOGY

## 2022-01-05 VITALS
RESPIRATION RATE: 20 BRPM | DIASTOLIC BLOOD PRESSURE: 88 MMHG | TEMPERATURE: 97.7 F | SYSTOLIC BLOOD PRESSURE: 144 MMHG | HEART RATE: 92 BPM | OXYGEN SATURATION: 98 %

## 2022-01-05 DIAGNOSIS — A52.8 LATE LATENT SYPHILIS: Primary | ICD-10-CM

## 2022-01-05 PROCEDURE — 96372 THER/PROPH/DIAG INJ SC/IM: CPT

## 2022-01-05 PROCEDURE — 25010000002 PENICILLIN G BENZATHINE PER 2400000 UNITS: Performed by: OBSTETRICS & GYNECOLOGY

## 2022-01-05 RX ORDER — SODIUM CHLORIDE 9 MG/ML
250 INJECTION, SOLUTION INTRAVENOUS ONCE
Status: CANCELLED | OUTPATIENT
Start: 2022-01-05

## 2022-01-05 RX ORDER — SODIUM CHLORIDE 9 MG/ML
250 INJECTION, SOLUTION INTRAVENOUS ONCE
Status: DISCONTINUED | OUTPATIENT
Start: 2022-01-05 | End: 2022-01-07 | Stop reason: HOSPADM

## 2022-01-05 RX ADMIN — PENICILLIN G BENZATHINE 2.4 MILLION UNITS: 2400000 INJECTION, SUSPENSION INTRAMUSCULAR at 15:04

## 2022-01-05 NOTE — PATIENT INSTRUCTIONS
Penicillin G Benzathine; Penicillin G Procaine Injection  What is this medicine?  PENICILLIN G BENZATHINE; PENICILLIN G PROCAINE (pen i SILL in G WALLY payne; pen i SILL in ANGELIC MEDINA) is a penicillin antibiotic. It treats some infections caused by bacteria. It will not work for colds, the flu, or other viruses.  This medicine may be used for other purposes; ask your health care provider or pharmacist if you have questions.  COMMON BRAND NAME(S): Bicillin C-R  What should I tell my health care provider before I take this medicine?  They need to know if you have any of these conditions:  · G6PD deficiency  · heart disease  · kidney disease  · lung or breathing disease (asthma, COPD)  · an unusual or allergic reaction to penicillin, other medicines, foods, dyes, or preservatives  · pregnant or trying to get pregnant  · breast-feeding  How should I use this medicine?  This drug is injected into a muscle. It is usually given by a health care provider in a hospital or clinic setting.  Talk to your health care provider about the use of this drug in children. While it may be prescribed for selected conditions, precautions do apply.  Overdosage: If you think you have taken too much of this medicine contact a poison control center or emergency room at once.  NOTE: This medicine is only for you. Do not share this medicine with others.  What if I miss a dose?  This does not apply. This drug is not for regular use.  What may interact with this medicine?  This medicine may interact with the following medications:  · acetaminophen  · birth control pills  · certain antibiotics like dapsone, nitrofurantoin, aminosalicylic acid, sulfonamides, tetracycline  · certain medicines for seizures like phenobarbital, valproic acid  · chloroquine  · cyclophosphamide  · flutamide  · hydroxyurea  · ifosfamide  · local anesthetics like benzocaine, lidocaine,  tetracaine  · metoclopramide  · nitrates  · primaquine  · probenecid  · rasburicase  · sulfasalazine  · quinine  This list may not describe all possible interactions. Give your health care provider a list of all the medicines, herbs, non-prescription drugs, or dietary supplements you use. Also tell them if you smoke, drink alcohol, or use illegal drugs. Some items may interact with your medicine.  What should I watch for while using this medicine?  Your condition will be monitored carefully while you are receiving this medicine. Tell your health care provider if your symptoms do not start to get better or if they get worse.  Do not treat diarrhea with over the counter products. Contact your health care provider if you have diarrhea that lasts more than 2 days or if it is severe and watery.  This medicine may cause serious skin reactions. They can happen weeks to months after starting the medicine. Contact your health care provider right away if you notice fevers or flu-like symptoms with a rash. The rash may be red or purple and then turn into blisters or peeling of the skin. Or, you might notice a red rash with swelling of the face, lips, or lymph nodes in your neck or under your arms.  If you have diabetes, you may get a false-positive result for sugar in your urine. Check with your health care provider.  Birth control may not work properly while you are taking this medicine. Talk to your health care provider about using an extra method of birth control.  If you notice a blue or gray discoloration of the mouth, lips, or nail beds, stop taking this medicine and contact your health care provider.  What side effects may I notice from receiving this medicine?  Side effects that you should report to your doctor or health care professional as soon as possible:  · allergic reactions like skin rash, itching or hives, swelling of the face, lips, or tongue  · bloody or watery diarrhea  · fever  · methemoglobinemia (trouble  breathing; fast, irregular heartbeat; feeling faint or lightheaded, falls; headache; pale, gray, or blue colored mouth, nails or skin; unusually weak or tired)  · redness, blistering, peeling or loosening of the skin, including inside the mouth  Side effects that usually do not require medical attention (report to your doctor or health care professional if they continue or are bothersome):  · dizziness  · drowsiness  · headache  · nausea  · pain, redness or irritation at the site where injected  · vomiting  This list may not describe all possible side effects. Call your doctor for medical advice about side effects. You may report side effects to FDA at 5-107-XWJ-7469.  Where should I keep my medicine?  This drug is given in a hospital or clinic. It will not be stored at home.  NOTE: This sheet is a summary. It may not cover all possible information. If you have questions about this medicine, talk to your doctor, pharmacist, or health care provider.  © 2021 [employer]/Gold Standard (2020-10-15 11:49:19)

## 2022-01-05 NOTE — PROGRESS NOTES
Pt tolerated injection with no issues. Injection site x 1 with band aide applied. Pt monitored for 30 minutes as this was her first dose.  No adverse reactions noted.  AVS given & pt DC per ambulation after completion of visit.

## 2022-01-12 ENCOUNTER — APPOINTMENT (OUTPATIENT)
Dept: INFUSION THERAPY | Facility: HOSPITAL | Age: 22
End: 2022-01-12

## 2022-01-14 ENCOUNTER — TELEPHONE (OUTPATIENT)
Dept: INTERNAL MEDICINE | Facility: CLINIC | Age: 22
End: 2022-01-14

## 2022-01-14 NOTE — TELEPHONE ENCOUNTER
Caller: Debbie Bauman    Relationship: Self    Best call back number: [phone]    Who are you requesting to speak with (clinical staff, provider,  specific staff member): CLINICAL STAFF     What was the call regarding: PATIENT TESTED POSITIVE FOR COVID NOW STATES HAS A COUGH THAT GETTING WORSE AND LUNGS ARE HURTING WANTING TO KNOW WHAT SHE SHOULD DO PLEASE CALL AND ADVISE     Do you require a callback: YES

## 2022-01-14 NOTE — TELEPHONE ENCOUNTER
"For patients that are not hospitalized there are not many options for treating COVID-19.  There are antibody infusions but our current supply in [place] is limited to only the sickest and high risk of patients.    Here are some things I recommend for my COVID+ patients:    Stay hydrated  Make sure you walk regularly to prevent formation of blood clots  Take vitamin D daily  Use acetaminophen regularly for discomfort  Sleep on your stomach if you can  Take frequent deep breaths - google \"moran coughing\" to clear mucus as well  If you have access to a pulse oximeter, I recommend going to the hospital if your oxygen drops below 90% for a prolonged period of time.  If your shortness of breath is worsening, or you are having chest pain, leg swelling, or other symptoms that are worsening, please get evaluated in urgent care or emergency room    I hope you feel better soon. I recommend 10 days from the start of your symptoms for initial quarantine, 14 days is the safer duration if you are able.      "

## 2022-01-19 ENCOUNTER — HOSPITAL ENCOUNTER (OUTPATIENT)
Dept: INFUSION THERAPY | Facility: HOSPITAL | Age: 22
Discharge: HOME | End: 2022-01-19
Admitting: OBSTETRICS & GYNECOLOGY

## 2022-01-19 VITALS
TEMPERATURE: 97.8 F | RESPIRATION RATE: 16 BRPM | HEART RATE: 74 BPM | SYSTOLIC BLOOD PRESSURE: 118 MMHG | OXYGEN SATURATION: 100 % | DIASTOLIC BLOOD PRESSURE: 79 MMHG

## 2022-01-19 DIAGNOSIS — A52.8 LATE LATENT SYPHILIS: Primary | ICD-10-CM

## 2022-01-19 PROCEDURE — 25010000002 PENICILLIN G BENZATHINE PER 2400000 UNITS: Performed by: OBSTETRICS & GYNECOLOGY

## 2022-01-19 PROCEDURE — 96372 THER/PROPH/DIAG INJ SC/IM: CPT

## 2022-01-19 RX ORDER — SODIUM CHLORIDE 9 MG/ML
250 INJECTION, SOLUTION INTRAVENOUS ONCE
Status: CANCELLED | OUTPATIENT
Start: 2022-01-19

## 2022-01-19 RX ADMIN — PENICILLIN G BENZATHINE 2.4 MILLION UNITS: 2400000 INJECTION, SUSPENSION INTRAMUSCULAR at 16:22

## 2022-01-19 NOTE — PATIENT INSTRUCTIONS
Syphilis  Syphilis is an infection that can spread through sexual contact. The infection can cause serious complications, so it is important to get treatment right away.  There are four stages of syphilis:  · Primary stage. During this stage sores may form where the disease entered your body.  · Secondary stage. During this stage skin rashes and lesions will form.  · Latent stage. During this stage there are no symptoms, but the infection may still be contagious.  · Tertiary stage. This stage happens 10-30 years after the infection starts. During this stage, the disease damages organs and can lead to death. Most people do not develop this stage of syphilis.  What are the causes?  This condition is caused by bacteria called Treponema pallidum. The condition can spread during sexual activity, such as during oral, anal, or vaginal sex. It can also be spread from mother to fetus during pregnancy.  What increases the risk?  You are more likely to develop this condition if:  · You do not use a condom.  · You have or had another sexually transmitted infection (STI).  · You have multiple sex partners.  · You use illegal drugs through an IV.  What are the signs or symptoms?  Symptoms of this condition depend on the stage of the disease.  Primary stage  · Painless sores (chancres) in and around the genital organs, mouth, or hands. The sores are usually firm and round.  Secondary stage  · A rash or sores. The rash usually does not itch.  · A fever.  · A headache.  · A sore throat.  · Swollen lymph nodes.  · New sores in the mouth or on the genitals.  · A feeling of being ill.  · Pain in the joints.  · Patchy hair loss.  · Weight loss.  · Fatigue.  Latent stage  There are no symptoms during this stage.  Tertiary stage  · Dementia.  · Personality and mood changes.  · Difficulty walking and coordinating movements.  · Muscle weakness or paralysis.  · Problems with coordination.  · Heart failure.  · Trouble  "breathing.  · Fainting.  · Soft, rubbery growths on the skin, bones, or liver (gummas).  · Sudden \"lightning\" pains, numbness, or tingling.  · Vision changes.  · Hearing changes.  · Trouble controlling your urine and bowel movements.  How is this diagnosed?  This condition is diagnosed with:  · A physical exam.  · Blood tests.  · Tests of the fluid (drainage) from a sore or rash.  · Tests of the fluid around the spine (lumbar puncture). These tests are done to check for an infection in the brain or nervous system.  · Imaging tests. These may be done to check for damage to the heart, aorta, or brain if the condition is in the tertiary stage. Tests may include:  ? An X-ray.  ? A CT scan.  ? An MRI.  ? An echocardiogram. This test takes a picture of the heart.  ? An ultrasound.  How is this treated?  This condition can be cured with antibiotic medicine. During the first day of treatment, the medicine may cause you to experience fever, chills, headache, nausea, or aching all over your body. This is normal and should go away within 24 hours.  Follow these instructions at home:  Medicines    · Take over-the-counter and prescription medicines only as told by your health care provider.  · Take your antibiotic medicine as told by your health care provider. Do not stop taking the antibiotic even if you start to feel better. Incomplete treatment will put you at risk for continued infection and could be life threatening.    General instructions  · Do not have sex until your treatment is completed, or as directed by your health care provider.  · Tell your recent sexual partners that you were diagnosed with syphilis. It is important that they get treatment, even if they do not have symptoms.  · Keep all follow-up visits as told by your health care provider. This is important.  How is this prevented?  · Use latex condoms correctly whenever you have sex.  · Before you have sex, ask your partner if he or she has been tested for " STIs. Ask about the test results.  · Avoid having multiple sexual partners.  Contact a health care provider if:  · You continue to have any of the following symptoms 24 hours after beginning treatment:  ? Fever.  ? Chills.  ? Headache.  ? Nausea.  ? Aching all over your body.  · Your symptoms do not improve, even with treatment.  Get help right away if:  · You have severe chest pain.  · You have trouble walking or coordinating movements.  · You are confused.  · You lose vision or hearing.  · You have numbness in your arms or legs.  · You have a seizure.  · You faint.  · You have a severe headache that does not go away with medicine.  Summary  · Syphilis is an infection that can spread through sexual contact.  · This condition can cause serious complications, so it is best to get treatment right away. The condition can be cured with antibiotic medicine.  · This condition can also be spread from mother to fetus during pregnancy.  · Take your antibiotic medicine as told by your health care provider.  · Tell your recent sexual partners that you were diagnosed with syphilis. It is important that they get treatment, even if they do not have symptoms.  This information is not intended to replace advice given to you by your health care provider. Make sure you discuss any questions you have with your health care provider.  Document Revised: 06/30/2020 Document Reviewed: 02/13/2018  [employer] Patient Education © 2021 [employer]  Penicillin G Benzathine Injection  What is this medicine?  PENICILLIN G BENZATHINE (pen i SILL in ANGELIC payne) is a penicillin antibiotic. It treats some infections caused by bacteria. It will not work for colds, the flu, or other viruses.  This medicine may be used for other purposes; ask your health care provider or pharmacist if you have questions.  COMMON BRAND NAME(S): Bicillin L-A, Permapen  What should I tell my health care provider before I take this medicine?  They need to know if you  have any of these conditions:  · kidney disease  · lung or breathing disease (asthma)  · an unusual or allergic reaction to penicillin, other medicines, foods, dyes, or preservatives  · pregnant or trying to get pregnant  · breast-feeding  How should I use this medicine?  This drug is injected into a muscle. It is usually given by a health care provider in a hospital or clinic setting.  Talk to your health care provider about the use of this drug in children. While it may be prescribed for selected conditions, precautions do apply.  Overdosage: If you think you have taken too much of this medicine contact a poison control center or emergency room at once.  NOTE: This medicine is only for you. Do not share this medicine with others.  What if I miss a dose?  This does not apply. This drug is not for regular use.  What may interact with this medicine?  This medicine may interact with the following medications:  · birth control pills  · probenecid  · tetracycline  This list may not describe all possible interactions. Give your health care provider a list of all the medicines, herbs, non-prescription drugs, or dietary supplements you use. Also tell them if you smoke, drink alcohol, or use illegal drugs. Some items may interact with your medicine.  What should I watch for while using this medicine?  Your condition will be monitored carefully while you are receiving this medicine. Tell your health care provider if your symptoms do not start to get better or if they get worse.  Do not treat diarrhea with over the counter products. Contact your health care provider if you have diarrhea that lasts more than 2 days or if it is severe and watery.  This medicine may cause serious skin reactions. They can happen weeks to months after starting the medicine. Contact your health care provider right away if you notice fevers or flu-like symptoms with a rash. The rash may be red or purple and then turn into blisters or peeling of the  skin. Or, you might notice a red rash with swelling of the face, lips, or lymph nodes in your neck or under your arms.  If you have diabetes, you may get a false-positive result for sugar in your urine. Check with your health care provider.  Birth control may not work properly while you are taking this medicine. Talk to your health care provider about using an extra method of birth control.  What side effects may I notice from receiving this medicine?  Side effects that you should report to your doctor or health care professional as soon as possible:  · allergic reactions like skin rash, itching or hives, swelling of the face, lips, or tongue  · bloody or watery diarrhea  · fever  · redness, blistering, peeling or loosening of the skin, including inside the mouth  Side effects that usually do not require medical attention (report to your doctor or health care professional if they continue or are bothersome):  · dizziness  · drowsiness  · headache  · nausea  · pain, redness or irritation at the site where injected  · vomiting  This list may not describe all possible side effects. Call your doctor for medical advice about side effects. You may report side effects to FDA at 0-900-FDA-2603.  Where should I keep my medicine?  This drug is given in a hospital or clinic. It will not be stored at home.  NOTE: This sheet is a summary. It may not cover all possible information. If you have questions about this medicine, talk to your doctor, pharmacist, or health care provider.  © 2021 [employer] (2020-12-16 08:52:28)

## 2022-01-19 NOTE — PROGRESS NOTES
Patient placed in isolation on arrival for ACU appointment as she reports testing positive for Covid on 1/12/2022. Patient tolerated injection. Injection site x 1 with band-aide applied. AVS provided and patient D/C'd via wheelchair to meet her ride/transportation.

## 2022-01-25 ENCOUNTER — TELEPHONE (OUTPATIENT)
Dept: OBSTETRICS AND GYNECOLOGY | Facility: CLINIC | Age: 22
End: 2022-01-25

## 2022-01-25 RX ORDER — FLUCONAZOLE 150 MG/1
150 TABLET ORAL ONCE
Qty: 1 TABLET | Refills: 0 | Status: SHIPPED | OUTPATIENT
Start: 2022-01-25 | End: 2022-01-25

## 2022-01-26 ENCOUNTER — HOSPITAL ENCOUNTER (OUTPATIENT)
Dept: INFUSION THERAPY | Facility: HOSPITAL | Age: 22
Discharge: HOME | End: 2022-01-26
Admitting: OBSTETRICS & GYNECOLOGY

## 2022-01-26 VITALS
HEART RATE: 77 BPM | RESPIRATION RATE: 18 BRPM | DIASTOLIC BLOOD PRESSURE: 83 MMHG | SYSTOLIC BLOOD PRESSURE: 139 MMHG | TEMPERATURE: 96.4 F | OXYGEN SATURATION: 98 %

## 2022-01-26 DIAGNOSIS — A52.8 LATE LATENT SYPHILIS: Primary | ICD-10-CM

## 2022-01-26 PROCEDURE — 96372 THER/PROPH/DIAG INJ SC/IM: CPT

## 2022-01-26 PROCEDURE — 25010000002 PENICILLIN G BENZATHINE PER 2400000 UNITS: Performed by: OBSTETRICS & GYNECOLOGY

## 2022-01-26 RX ORDER — SODIUM CHLORIDE 9 MG/ML
250 INJECTION, SOLUTION INTRAVENOUS ONCE
Status: CANCELLED | OUTPATIENT
Start: 2022-01-26

## 2022-01-26 RX ADMIN — PENICILLIN G BENZATHINE 2.4 MILLION UNITS: 2400000 INJECTION, SUSPENSION INTRAMUSCULAR at 11:49

## 2022-01-26 NOTE — PROGRESS NOTES
"Patient reported possible yeast infection symptoms upon arrival to [facility]U including itching and \"milky, white, cottage cheese like substance\" draining from vaginal area. Patient reports having called MD (OB/GYN) office, Dr. Esteves yesterday and reports has not yet received return call. MD note read to patient and MD plan related to patient. Patient verbalized understanding to try OTC product and follow-up with MD regarding oral medication. Patient tolerated injection and ambulated out of ACU at end of appointment.  "

## 2022-02-08 ENCOUNTER — PROCEDURE VISIT (OUTPATIENT)
Dept: OBSTETRICS AND GYNECOLOGY | Facility: CLINIC | Age: 22
End: 2022-02-08

## 2022-02-08 VITALS
WEIGHT: 165 LBS | HEIGHT: 67 IN | BODY MASS INDEX: 25.9 KG/M2 | SYSTOLIC BLOOD PRESSURE: 157 MMHG | DIASTOLIC BLOOD PRESSURE: 94 MMHG

## 2022-02-08 DIAGNOSIS — N89.8 VAGINAL DISCHARGE: Primary | ICD-10-CM

## 2022-02-08 DIAGNOSIS — A52.8 LATE LATENT SYPHILIS: ICD-10-CM

## 2022-02-08 DIAGNOSIS — Z30.432 ENCOUNTER FOR IUD REMOVAL: ICD-10-CM

## 2022-02-08 PROCEDURE — 99213 OFFICE O/P EST LOW 20 MIN: CPT | Performed by: OBSTETRICS & GYNECOLOGY

## 2022-02-08 PROCEDURE — 58301 REMOVE INTRAUTERINE DEVICE: CPT | Performed by: OBSTETRICS & GYNECOLOGY

## 2022-02-08 NOTE — PROGRESS NOTES
Subjective   Debbie Bauman is a 21 y.o. female.     Cc:  IUD removal, vaginal discharge    History of Present Illness - Patient is a 21 year old female parous times one who requests removal of IUD.  Her IUD was placed in 2019 and she does not want it replaced due to cramping.  Complicating her situation is recurrent syphilis with suboptimal treatment of her partner.  In addition, she reports condom failures.  She has vaginal discharge today.  Last STD testing was within past 2 months.    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  She  reports that she quit smoking about 3 years ago. Her smoking use included cigarettes. She smoked 0.10 packs per day. She has never used smokeless tobacco. She reports current alcohol use. She reports current drug use. Drug: Marijuana.  Current Outpatient Medications   Medication Sig Dispense Refill   • Levonorgestrel (ANITHA) 13.5 MG intrauterine device IUD 1 each by Intrauterine route 1 (One) Time.       No current facility-administered medications for this visit.     She is allergic to bee venom..    Review of Systems   Constitutional: Negative for chills and fever.   Genitourinary: Positive for pelvic pain and vaginal discharge.       Objective   Physical Exam  Vitals reviewed. Exam conducted with a chaperone present.   Constitutional:       Appearance: Normal appearance.   Genitourinary:     General: Normal vulva.      Exam position: Lithotomy  position.      Labia:         Right: No rash or tenderness.         Left: No rash or tenderness.       Urethra: No urethral pain or urethral lesion.      Vagina: Vaginal discharge present.      Cervix: Normal.   Neurological:      Mental Status: She is alert.   Psychiatric:         Mood and Affect: Mood normal.         Behavior: Behavior normal.         Thought Content: Thought content normal.         Judgment: Judgment normal.     IUD Removal Procedure Note    Type of IUD:  Kristin  Date of insertion:  2/7/19  Reason for removal:  Device expiration  Other relevant history/information:  none    Procedure Time Out Documentation      Procedure Details  IUD strings visible:  yes  Local anesthesia:  None  Tenaculum used:  None  Removal:  IUD strings grasped and IUD removed intact with gentle traction.  The patient tolerated the procedure well.    All appropriate instructions regarding removal were reviewed.    Patient tolerated the procedure well without complications.    Plans for contraception:  condoms    Other follow-up needed:  none    The patient was advised to call for any fever or for prolonged or severe pain or bleeding. She was advised to use NSAID as needed for mild to moderate pain.       Assessment/Plan   Diagnoses and all orders for this visit:    1. Vaginal discharge (Primary)  - Await swab.  2. Late latent syphilis  - Patient to follow up in 6 months for RPR testing.  3. Encounter for IUD removal  - Removal of IUD without difficulty.  However, discussed risks of STD/syphilis with pregnancy/conception, especially with condom difficulties reported and improper treatment of partner.  Patient voices understanding.    Anthony Esteves MD

## 2022-02-10 ENCOUNTER — TELEPHONE (OUTPATIENT)
Dept: OBSTETRICS AND GYNECOLOGY | Facility: CLINIC | Age: 22
End: 2022-02-10

## 2022-02-10 LAB
A VAGINAE DNA VAG QL NAA+PROBE: NORMAL SCORE
BVAB2 DNA VAG QL NAA+PROBE: NORMAL SCORE
C ALBICANS DNA VAG QL NAA+PROBE: NEGATIVE
C GLABRATA DNA VAG QL NAA+PROBE: NEGATIVE
C TRACH DNA VAG QL NAA+PROBE: NEGATIVE
MEGA1 DNA VAG QL NAA+PROBE: NORMAL SCORE
N GONORRHOEA DNA VAG QL NAA+PROBE: NEGATIVE
T VAGINALIS DNA VAG QL NAA+PROBE: NEGATIVE

## 2022-03-15 ENCOUNTER — INITIAL PRENATAL (OUTPATIENT)
Dept: OBSTETRICS AND GYNECOLOGY | Facility: CLINIC | Age: 22
End: 2022-03-15

## 2022-03-15 VITALS — BODY MASS INDEX: 26.46 KG/M2 | SYSTOLIC BLOOD PRESSURE: 144 MMHG | WEIGHT: 169 LBS | DIASTOLIC BLOOD PRESSURE: 86 MMHG

## 2022-03-15 DIAGNOSIS — Z3A.01 5 WEEKS GESTATION OF PREGNANCY: ICD-10-CM

## 2022-03-15 DIAGNOSIS — Z34.81 MULTIGRAVIDA IN FIRST TRIMESTER: Primary | ICD-10-CM

## 2022-03-15 DIAGNOSIS — O10.919 ESSENTIAL HYPERTENSION IN PREGNANCY: ICD-10-CM

## 2022-03-15 DIAGNOSIS — A52.8 LATE LATENT SYPHILIS: ICD-10-CM

## 2022-03-15 PROCEDURE — 99214 OFFICE O/P EST MOD 30 MIN: CPT | Performed by: OBSTETRICS & GYNECOLOGY

## 2022-03-15 RX ORDER — PNV NO.95/FERROUS FUM/FOLIC AC 28MG-0.8MG
1 TABLET ORAL DAILY
Qty: 30 TABLET | Refills: 11 | Status: SHIPPED | OUTPATIENT
Start: 2022-03-15 | End: 2022-12-14

## 2022-03-15 NOTE — PROGRESS NOTES
Patient recently started taking a year old prescription she had of Zoloft for the past month, she states she needs this for sever mood issues.

## 2022-03-16 LAB
ABO GROUP BLD: ABNORMAL
ALBUMIN SERPL-MCNC: 4.3 G/DL (ref 3.9–5)
ALBUMIN/GLOB SERPL: 1.5 {RATIO} (ref 1.2–2.2)
ALP SERPL-CCNC: 67 IU/L (ref 44–121)
ALT SERPL-CCNC: 13 IU/L (ref 0–32)
AST SERPL-CCNC: 15 IU/L (ref 0–40)
BASOPHILS # BLD AUTO: 0 X10E3/UL (ref 0–0.2)
BASOPHILS NFR BLD AUTO: 0 %
BILIRUB SERPL-MCNC: 0.3 MG/DL (ref 0–1.2)
BLD GP AB SCN SERPL QL: NEGATIVE
BUN SERPL-MCNC: 8 MG/DL (ref 6–20)
BUN/CREAT SERPL: 14 (ref 9–23)
CALCIUM SERPL-MCNC: 9.2 MG/DL (ref 8.7–10.2)
CHLORIDE SERPL-SCNC: 102 MMOL/L (ref 96–106)
CO2 SERPL-SCNC: 20 MMOL/L (ref 20–29)
CREAT SERPL-MCNC: 0.56 MG/DL (ref 0.57–1)
CREAT UR-MCNC: 111.8 MG/DL
EGFRCR SERPLBLD CKD-EPI 2021: 131 ML/MIN/1.73
EOSINOPHIL # BLD AUTO: 0.1 X10E3/UL (ref 0–0.4)
EOSINOPHIL NFR BLD AUTO: 1 %
ERYTHROCYTE [DISTWIDTH] IN BLOOD BY AUTOMATED COUNT: 13.1 % (ref 11.7–15.4)
GLOBULIN SER CALC-MCNC: 2.9 G/DL (ref 1.5–4.5)
GLUCOSE SERPL-MCNC: 85 MG/DL (ref 65–99)
HBV SURFACE AG SERPL QL IA: NEGATIVE
HCG INTACT+B SERPL-ACNC: 1839 MIU/ML
HCT VFR BLD AUTO: 40.6 % (ref 34–46.6)
HCV AB S/CO SERPL IA: 0.1 S/CO RATIO (ref 0–0.9)
HGB BLD-MCNC: 13.5 G/DL (ref 11.1–15.9)
HIV 1+2 AB+HIV1 P24 AG SERPL QL IA: NON REACTIVE
IMM GRANULOCYTES # BLD AUTO: 0 X10E3/UL (ref 0–0.1)
IMM GRANULOCYTES NFR BLD AUTO: 0 %
LYMPHOCYTES # BLD AUTO: 1.8 X10E3/UL (ref 0.7–3.1)
LYMPHOCYTES NFR BLD AUTO: 23 %
MCH RBC QN AUTO: 29.3 PG (ref 26.6–33)
MCHC RBC AUTO-ENTMCNC: 33.3 G/DL (ref 31.5–35.7)
MCV RBC AUTO: 88 FL (ref 79–97)
MONOCYTES # BLD AUTO: 0.6 X10E3/UL (ref 0.1–0.9)
MONOCYTES NFR BLD AUTO: 7 %
NEUTROPHILS # BLD AUTO: 5.6 X10E3/UL (ref 1.4–7)
NEUTROPHILS NFR BLD AUTO: 69 %
PLATELET # BLD AUTO: 254 X10E3/UL (ref 150–450)
POTASSIUM SERPL-SCNC: 4.4 MMOL/L (ref 3.5–5.2)
PROT SERPL-MCNC: 7.2 G/DL (ref 6–8.5)
PROT UR-MCNC: 32.4 MG/DL
PROT/CREAT UR: 290 MG/G CREAT (ref 0–200)
RBC # BLD AUTO: 4.61 X10E6/UL (ref 3.77–5.28)
RH BLD: POSITIVE
RPR SER QL: REACTIVE
RPR SER-TITR: ABNORMAL {TITER}
RUBV IGG SERPL IA-ACNC: 9.72 INDEX
SARS-COV-2 AB SERPL QL IA: POSITIVE
SODIUM SERPL-SCNC: 138 MMOL/L (ref 134–144)
TREPONEMA PALLIDUM IGG+IGM AB [PRESENCE] IN SERUM OR PLASMA BY IMMUNOASSAY: REACTIVE
WBC # BLD AUTO: 8.2 X10E3/UL (ref 3.4–10.8)

## 2022-03-16 NOTE — PROGRESS NOTES
Cc:  First visit for new pregnancy  Patient was not attempting conception.  She had IUD removed and missed her cycle.  She did home pregnancy test that was positive.  Patient completed treatment for late latent syphilis in January.  Patient denies bleeding or spotting.  She has no abdominal pain.  Past medical, surgical, obstetrical, genetic, social and family histories.  Allergies and medications.  10 Point ROS reviewed.  Physical exam documented in chart.  Prenatal labs draw;  CBC, CMP and protein/creatinine ratio done.  A/P:  IUP at 4 weeks with history of syphilis, cHTN.  - Syphilis.  It is not clear if patient has been adequately responded to treatment as she has not had a titer since completed penicillin series.  Discussed risks of congenital syphilis if patient did not respond to treatment.  Will assess RPR.  - cHTN.  Patient with likely chronic HTN.  She was treated with Procardia in last pregnancy.  She did receive treatment between pregnancies but recently stopped.  Blood pressure is borderline elevated.  Recommend classifying patient as having chronic HTN.  Will plan to start baby ASA at end of first trimester.  Will hold off on medication now.  - Discussed maternal well being.

## 2022-03-17 ENCOUNTER — TELEPHONE (OUTPATIENT)
Dept: OBSTETRICS AND GYNECOLOGY | Facility: CLINIC | Age: 22
End: 2022-03-17

## 2022-03-17 LAB
C TRACH DNA SPEC QL NAA+PROBE: NEGATIVE
N GONORRHOEA DNA SPEC QL NAA+PROBE: NEGATIVE
T VAGINALIS DNA SPEC QL NAA+PROBE: NEGATIVE

## 2022-03-17 NOTE — TELEPHONE ENCOUNTER
Lianna    Let her know that her syphilis titer dropped appropriately.  It generally appears that treatment is working.  This likely lowers risk to baby; however, it is not zero that baby cannot be infected by syphilis - but it is still lower.    We will perform an ultrasound in 3 months, looking for any effects of syphilis on pregnancy.  In 3 months, she will also require another syphilis test.    I will go over in detail when she returns for next visit.

## 2022-03-18 ENCOUNTER — TELEPHONE (OUTPATIENT)
Dept: OBSTETRICS AND GYNECOLOGY | Facility: CLINIC | Age: 22
End: 2022-03-18

## 2022-03-18 RX ORDER — CEPHALEXIN 500 MG/1
500 CAPSULE ORAL 3 TIMES DAILY
Qty: 21 CAPSULE | Refills: 0 | Status: SHIPPED | OUTPATIENT
Start: 2022-03-18 | End: 2022-03-25

## 2022-03-18 NOTE — TELEPHONE ENCOUNTER
Spoke with Debbie to let her know that Dr Esteves wanted to let her know that her syphilis titer dropped appropriately. It generally appears that the treatment is working. This likely lowers risk to baby; however, it is not zero that baby cannot be infected by syphilis - but it is still lower. Dr Esteves will perform and US in 3 months, looking for any effects of syphilis. In 3 months, you will also require another syphilis test. Dr Esteves said he will go over in detail when you return for your next visit. I suggested she write a list of questions for Dr Esteves, so she would make sure that she has all of her concerns answered.

## 2022-03-18 NOTE — TELEPHONE ENCOUNTER
I spoke with patient about 2 issues.    1) RPR.  Patient with significant decline.  This does not completely rule out risk of  syphilis but likely reduces risks.  She will need ultrasound for congenital syphilis midgestation.    2)  UTI.  Will call in antibiotics.

## 2022-03-19 LAB
BACTERIA UR CULT: ABNORMAL
BACTERIA UR CULT: ABNORMAL
OTHER ANTIBIOTIC SUSC ISLT: ABNORMAL

## 2022-03-21 ENCOUNTER — TELEPHONE (OUTPATIENT)
Dept: OBSTETRICS AND GYNECOLOGY | Facility: CLINIC | Age: 22
End: 2022-03-21

## 2022-03-21 RX ORDER — NITROFURANTOIN 25; 75 MG/1; MG/1
100 CAPSULE ORAL 2 TIMES DAILY
Qty: 14 CAPSULE | Refills: 0 | Status: SHIPPED | OUTPATIENT
Start: 2022-03-21 | End: 2022-03-28

## 2022-03-21 NOTE — TELEPHONE ENCOUNTER
Left message for Debbie that Dr Esteves wanted me to call and let her know that her final urine culture shows that her infection should be treated with a better antibiotic. He called in macrobid to [facility]. You should pick that up today and stop taking the keflex and start the macrobid.

## 2022-03-21 NOTE — TELEPHONE ENCOUNTER
Lianna    Let her know that her final urine culture shows that her infection should be treated with a better antibiotic.    I called in something different and she should switch to that.    Thanks    Danielito

## 2022-03-22 ENCOUNTER — TELEPHONE (OUTPATIENT)
Dept: OBSTETRICS AND GYNECOLOGY | Facility: CLINIC | Age: 22
End: 2022-03-22

## 2022-03-22 RX ORDER — PROMETHAZINE HYDROCHLORIDE 12.5 MG/1
12.5 TABLET ORAL EVERY 6 HOURS PRN
Qty: 20 TABLET | Refills: 0 | Status: SHIPPED | OUTPATIENT
Start: 2022-03-22 | End: 2022-11-04 | Stop reason: HOSPADM

## 2022-03-23 LAB
CFTR MUT ANL BLD/T: NORMAL
LABORATORY COMMENT REPORT: NORMAL

## 2022-03-27 LAB
AMPHETAMINES UR QL SCN: NEGATIVE NG/ML
BARBITURATES UR QL SCN: NEGATIVE NG/ML
BENZODIAZ UR QL: NEGATIVE NG/ML
BZE UR QL: NEGATIVE NG/ML
CANNABINOIDS UR CFM-MCNC: POSITIVE NG/ML
METHADONE UR QL SCN: NEGATIVE NG/ML
OPIATES UR QL: NEGATIVE NG/ML
PCP UR QL: NEGATIVE NG/ML
PROPOXYPH UR QL SCN: NEGATIVE NG/ML

## 2022-03-28 ENCOUNTER — ROUTINE PRENATAL (OUTPATIENT)
Dept: OBSTETRICS AND GYNECOLOGY | Facility: CLINIC | Age: 22
End: 2022-03-28

## 2022-03-28 VITALS — SYSTOLIC BLOOD PRESSURE: 144 MMHG | DIASTOLIC BLOOD PRESSURE: 88 MMHG | BODY MASS INDEX: 26.31 KG/M2 | WEIGHT: 168 LBS

## 2022-03-28 DIAGNOSIS — A52.8 LATE LATENT SYPHILIS: ICD-10-CM

## 2022-03-28 DIAGNOSIS — O21.9 NAUSEA AND VOMITING IN PREGNANCY: ICD-10-CM

## 2022-03-28 DIAGNOSIS — Z3A.01 6 WEEKS GESTATION OF PREGNANCY: ICD-10-CM

## 2022-03-28 DIAGNOSIS — O98.111 SYPHILIS AFFECTING PREGNANCY IN FIRST TRIMESTER: ICD-10-CM

## 2022-03-28 DIAGNOSIS — Z34.81 MULTIGRAVIDA IN FIRST TRIMESTER: Primary | ICD-10-CM

## 2022-03-28 DIAGNOSIS — O10.919 ESSENTIAL HYPERTENSION IN PREGNANCY: ICD-10-CM

## 2022-03-28 LAB
GLUCOSE UR STRIP-MCNC: NEGATIVE MG/DL
PROT UR STRIP-MCNC: ABNORMAL MG/DL

## 2022-03-28 PROCEDURE — 99214 OFFICE O/P EST MOD 30 MIN: CPT | Performed by: OBSTETRICS & GYNECOLOGY

## 2022-03-28 RX ORDER — ONDANSETRON 4 MG/1
4 TABLET, ORALLY DISINTEGRATING ORAL EVERY 8 HOURS PRN
Qty: 20 TABLET | Refills: 1 | Status: ON HOLD | OUTPATIENT
Start: 2022-03-28 | End: 2022-08-11

## 2022-03-28 NOTE — PROGRESS NOTES
"Cc:  Pregnancy follow up.  Patient with c/o severe nausea and vomiting, she is taking phenergan.  She also c/o feeling \"cold.\"  No bleeding.  No abdominal pain.  Patient is working to wean from THC.  Gen - alert and pleasant.   Ultrasound with 6+ week viable gestation with cardiac activity.  A/P:  IUP at 6 weeks with syphilis, chronic HTN, nausea in pregnancy.  - Syphilis.  RPR with 4 fold decrease, which is appropriate after treatment.  There is a risk of congenital syphilis but likely low because patient treated prior to conception.  Sonogram at midgestation.  - HTN.  Patient with 2 blood pressure readings over 140 mm Hg systolic.  She reports diagnosis of HTN with PCP but has not taken any treatment.  Will monitor closely.  Start baby ASA at end of first trimester.  - N/V.  Patient was regular THC user prior to conception and is trying to wean.  She is having difficulty with nausea; discussed complete cessation of THC and trying to manage nausea with diet and Zofran.    Vitals reviewed. Prenatal lab: HGB 13.5.   "

## 2022-03-30 ENCOUNTER — APPOINTMENT (OUTPATIENT)
Dept: ULTRASOUND IMAGING | Facility: HOSPITAL | Age: 22
End: 2022-03-30

## 2022-03-30 ENCOUNTER — HOSPITAL ENCOUNTER (EMERGENCY)
Facility: HOSPITAL | Age: 22
Discharge: HOME | End: 2022-03-31
Attending: EMERGENCY MEDICINE | Admitting: INTERNAL MEDICINE

## 2022-03-30 DIAGNOSIS — O21.9 NAUSEA AND VOMITING DURING PREGNANCY: Primary | ICD-10-CM

## 2022-03-30 DIAGNOSIS — O46.90 VAGINAL BLEEDING DURING PREGNANCY: ICD-10-CM

## 2022-03-30 LAB
ALBUMIN SERPL-MCNC: 4.8 G/DL (ref 3.5–5.2)
ALBUMIN/GLOB SERPL: 1.6 G/DL
ALP SERPL-CCNC: 73 U/L (ref 39–117)
ALT SERPL W P-5'-P-CCNC: 20 U/L (ref 1–33)
ANION GAP SERPL CALCULATED.3IONS-SCNC: 10 MMOL/L (ref 5–15)
AST SERPL-CCNC: 18 U/L (ref 1–32)
BACTERIA UR QL AUTO: ABNORMAL /HPF
BASOPHILS # BLD AUTO: 0.04 10*3/MM3 (ref 0–0.2)
BASOPHILS NFR BLD AUTO: 0.4 % (ref 0–1.5)
BILIRUB SERPL-MCNC: 0.4 MG/DL (ref 0–1.2)
BILIRUB UR QL STRIP: NEGATIVE
BUN SERPL-MCNC: 5 MG/DL (ref 6–20)
BUN/CREAT SERPL: 9.3 (ref 7–25)
CALCIUM SPEC-SCNC: 9 MG/DL (ref 8.6–10.5)
CHLORIDE SERPL-SCNC: 102 MMOL/L (ref 98–107)
CLARITY UR: ABNORMAL
CO2 SERPL-SCNC: 26 MMOL/L (ref 22–29)
COLOR UR: ABNORMAL
CREAT SERPL-MCNC: 0.54 MG/DL (ref 0.57–1)
DEPRECATED RDW RBC AUTO: 43.8 FL (ref 37–54)
EGFRCR SERPLBLD CKD-EPI 2021: 132.9 ML/MIN/1.73
EOSINOPHIL # BLD AUTO: 0.13 10*3/MM3 (ref 0–0.4)
EOSINOPHIL NFR BLD AUTO: 1.2 % (ref 0.3–6.2)
ERYTHROCYTE [DISTWIDTH] IN BLOOD BY AUTOMATED COUNT: 13.3 % (ref 12.3–15.4)
GLOBULIN UR ELPH-MCNC: 3 GM/DL
GLUCOSE SERPL-MCNC: 90 MG/DL (ref 65–99)
GLUCOSE UR STRIP-MCNC: NEGATIVE MG/DL
HCG INTACT+B SERPL-ACNC: NORMAL MIU/ML
HCG SERPL QL: POSITIVE
HCT VFR BLD AUTO: 43.6 % (ref 34–46.6)
HGB BLD-MCNC: 14.5 G/DL (ref 12–15.9)
HGB UR QL STRIP.AUTO: NEGATIVE
HOLD SPECIMEN: NORMAL
HYALINE CASTS UR QL AUTO: ABNORMAL /LPF
IMM GRANULOCYTES # BLD AUTO: 0.03 10*3/MM3 (ref 0–0.05)
IMM GRANULOCYTES NFR BLD AUTO: 0.3 % (ref 0–0.5)
KETONES UR QL STRIP: ABNORMAL
LEUKOCYTE ESTERASE UR QL STRIP.AUTO: ABNORMAL
LIPASE SERPL-CCNC: 17 U/L (ref 13–60)
LYMPHOCYTES # BLD AUTO: 1.67 10*3/MM3 (ref 0.7–3.1)
LYMPHOCYTES NFR BLD AUTO: 15.2 % (ref 19.6–45.3)
MCH RBC QN AUTO: 30 PG (ref 26.6–33)
MCHC RBC AUTO-ENTMCNC: 33.3 G/DL (ref 31.5–35.7)
MCV RBC AUTO: 90.3 FL (ref 79–97)
MONOCYTES # BLD AUTO: 0.68 10*3/MM3 (ref 0.1–0.9)
MONOCYTES NFR BLD AUTO: 6.2 % (ref 5–12)
NEUTROPHILS NFR BLD AUTO: 76.7 % (ref 42.7–76)
NEUTROPHILS NFR BLD AUTO: 8.41 10*3/MM3 (ref 1.7–7)
NITRITE UR QL STRIP: NEGATIVE
NRBC BLD AUTO-RTO: 0.1 /100 WBC (ref 0–0.2)
PH UR STRIP.AUTO: >=9 [PH] (ref 5–8)
PLATELET # BLD AUTO: 241 10*3/MM3 (ref 140–450)
PMV BLD AUTO: 10.2 FL (ref 6–12)
POTASSIUM SERPL-SCNC: 4 MMOL/L (ref 3.5–5.2)
PROT SERPL-MCNC: 7.8 G/DL (ref 6–8.5)
PROT UR QL STRIP: ABNORMAL
RBC # BLD AUTO: 4.83 10*6/MM3 (ref 3.77–5.28)
RBC # UR STRIP: ABNORMAL /HPF
REF LAB TEST METHOD: ABNORMAL
SODIUM SERPL-SCNC: 138 MMOL/L (ref 136–145)
SP GR UR STRIP: 1.03 (ref 1–1.03)
SQUAMOUS #/AREA URNS HPF: ABNORMAL /HPF
UROBILINOGEN UR QL STRIP: ABNORMAL
WBC # UR STRIP: ABNORMAL /HPF
WBC NRBC COR # BLD: 10.96 10*3/MM3 (ref 3.4–10.8)
WHOLE BLOOD HOLD SPECIMEN: NORMAL
WHOLE BLOOD HOLD SPECIMEN: NORMAL

## 2022-03-30 PROCEDURE — 25010000002 METOCLOPRAMIDE PER 10 MG: Performed by: PHYSICIAN ASSISTANT

## 2022-03-30 PROCEDURE — 93976 VASCULAR STUDY: CPT

## 2022-03-30 PROCEDURE — 96361 HYDRATE IV INFUSION ADD-ON: CPT

## 2022-03-30 PROCEDURE — 76815 OB US LIMITED FETUS(S): CPT

## 2022-03-30 PROCEDURE — 81001 URINALYSIS AUTO W/SCOPE: CPT

## 2022-03-30 PROCEDURE — 83690 ASSAY OF LIPASE: CPT

## 2022-03-30 PROCEDURE — 80053 COMPREHEN METABOLIC PANEL: CPT

## 2022-03-30 PROCEDURE — 84703 CHORIONIC GONADOTROPIN ASSAY: CPT

## 2022-03-30 PROCEDURE — 96374 THER/PROPH/DIAG INJ IV PUSH: CPT

## 2022-03-30 PROCEDURE — 84702 CHORIONIC GONADOTROPIN TEST: CPT | Performed by: PHYSICIAN ASSISTANT

## 2022-03-30 PROCEDURE — 36415 COLL VENOUS BLD VENIPUNCTURE: CPT

## 2022-03-30 PROCEDURE — 85025 COMPLETE CBC W/AUTO DIFF WBC: CPT

## 2022-03-30 PROCEDURE — 76817 TRANSVAGINAL US OBSTETRIC: CPT

## 2022-03-30 PROCEDURE — 99283 EMERGENCY DEPT VISIT LOW MDM: CPT

## 2022-03-30 RX ORDER — METOCLOPRAMIDE HYDROCHLORIDE 5 MG/ML
10 INJECTION INTRAMUSCULAR; INTRAVENOUS ONCE
Status: COMPLETED | OUTPATIENT
Start: 2022-03-30 | End: 2022-03-30

## 2022-03-30 RX ORDER — DEXTROSE AND SODIUM CHLORIDE 5; .45 G/100ML; G/100ML
1000 INJECTION, SOLUTION INTRAVENOUS CONTINUOUS
Status: DISCONTINUED | OUTPATIENT
Start: 2022-03-30 | End: 2022-03-31 | Stop reason: HOSPADM

## 2022-03-30 RX ORDER — DEXTROSE AND SODIUM CHLORIDE 5; .45 G/100ML; G/100ML
500 INJECTION, SOLUTION INTRAVENOUS CONTINUOUS
Status: DISCONTINUED | OUTPATIENT
Start: 2022-03-30 | End: 2022-03-31 | Stop reason: HOSPADM

## 2022-03-30 RX ORDER — SODIUM CHLORIDE 0.9 % (FLUSH) 0.9 %
10 SYRINGE (ML) INJECTION AS NEEDED
Status: DISCONTINUED | OUTPATIENT
Start: 2022-03-30 | End: 2022-03-31 | Stop reason: HOSPADM

## 2022-03-30 RX ADMIN — METOCLOPRAMIDE HYDROCHLORIDE 10 MG: 5 INJECTION INTRAMUSCULAR; INTRAVENOUS at 22:56

## 2022-03-30 RX ADMIN — DEXTROSE AND SODIUM CHLORIDE 500 ML/HR: 5; .45 INJECTION, SOLUTION INTRAVENOUS at 22:44

## 2022-03-30 RX ADMIN — DEXTROSE AND SODIUM CHLORIDE 1000 ML: 5; .45 INJECTION, SOLUTION INTRAVENOUS at 22:56

## 2022-03-31 VITALS
HEART RATE: 72 BPM | RESPIRATION RATE: 15 BRPM | TEMPERATURE: 97.8 F | OXYGEN SATURATION: 98 % | SYSTOLIC BLOOD PRESSURE: 105 MMHG | DIASTOLIC BLOOD PRESSURE: 62 MMHG

## 2022-03-31 LAB
ABO GROUP BLD: NORMAL
RH BLD: POSITIVE

## 2022-03-31 PROCEDURE — 86900 BLOOD TYPING SEROLOGIC ABO: CPT | Performed by: PHYSICIAN ASSISTANT

## 2022-03-31 PROCEDURE — 96375 TX/PRO/DX INJ NEW DRUG ADDON: CPT

## 2022-03-31 PROCEDURE — 86901 BLOOD TYPING SEROLOGIC RH(D): CPT | Performed by: PHYSICIAN ASSISTANT

## 2022-03-31 PROCEDURE — 25010000002 ONDANSETRON PER 1 MG: Performed by: PHYSICIAN ASSISTANT

## 2022-03-31 PROCEDURE — 96361 HYDRATE IV INFUSION ADD-ON: CPT

## 2022-03-31 RX ORDER — ONDANSETRON 2 MG/ML
8 INJECTION INTRAMUSCULAR; INTRAVENOUS ONCE
Status: COMPLETED | OUTPATIENT
Start: 2022-03-31 | End: 2022-03-31

## 2022-03-31 RX ORDER — PROMETHAZINE HYDROCHLORIDE 25 MG/1
25 SUPPOSITORY RECTAL EVERY 6 HOURS PRN
Qty: 30 SUPPOSITORY | Refills: 0 | Status: SHIPPED | OUTPATIENT
Start: 2022-03-31 | End: 2022-11-04 | Stop reason: HOSPADM

## 2022-03-31 RX ORDER — DOXYLAMINE SUCCINATE AND PYRIDOXINE HYDROCHLORIDE, DELAYED RELEASE TABLETS 10 MG/10 MG 10; 10 MG/1; MG/1
2 TABLET, DELAYED RELEASE ORAL NIGHTLY PRN
Qty: 60 TABLET | Refills: 0 | Status: SHIPPED | OUTPATIENT
Start: 2022-03-31 | End: 2022-11-04 | Stop reason: HOSPADM

## 2022-03-31 RX ADMIN — ONDANSETRON 8 MG: 2 INJECTION INTRAMUSCULAR; INTRAVENOUS at 03:14

## 2022-04-26 ENCOUNTER — ROUTINE PRENATAL (OUTPATIENT)
Dept: OBSTETRICS AND GYNECOLOGY | Facility: CLINIC | Age: 22
End: 2022-04-26

## 2022-04-26 VITALS — DIASTOLIC BLOOD PRESSURE: 86 MMHG | SYSTOLIC BLOOD PRESSURE: 143 MMHG | WEIGHT: 172 LBS | BODY MASS INDEX: 26.93 KG/M2

## 2022-04-26 DIAGNOSIS — O10.919 ESSENTIAL HYPERTENSION IN PREGNANCY: ICD-10-CM

## 2022-04-26 DIAGNOSIS — Z34.81 MULTIGRAVIDA IN FIRST TRIMESTER: Primary | ICD-10-CM

## 2022-04-26 DIAGNOSIS — Z3A.10 10 WEEKS GESTATION OF PREGNANCY: ICD-10-CM

## 2022-04-26 DIAGNOSIS — O98.111 SYPHILIS AFFECTING PREGNANCY IN FIRST TRIMESTER: ICD-10-CM

## 2022-04-26 LAB
GLUCOSE UR STRIP-MCNC: NEGATIVE MG/DL
PROT UR STRIP-MCNC: ABNORMAL MG/DL

## 2022-04-26 PROCEDURE — 99213 OFFICE O/P EST LOW 20 MIN: CPT | Performed by: OBSTETRICS & GYNECOLOGY

## 2022-04-26 NOTE — PROGRESS NOTES
Cc:  Pregnancy follow up.  Patient with intermitten pain in her thighs.  No bleeding or spotting.  Hydrating and taking vitamins.  Vitals reviewed by me.  Gen - alert and pleasant.  Abdomen - nontender, no guarding or rebound.  A/P:  IUP at 10 weeks with probable HTN, history of syphilis  - HTN.  Start baby ASA at end of first trimester.  - Syphilis.  Assess for congenital syphilis at midgestation.  Recheck RPR in June (3 months from previous assessment.)  Plan reviewed.  - Discussed maternal well being.

## 2022-05-24 ENCOUNTER — ROUTINE PRENATAL (OUTPATIENT)
Dept: OBSTETRICS AND GYNECOLOGY | Facility: CLINIC | Age: 22
End: 2022-05-24

## 2022-05-24 VITALS — SYSTOLIC BLOOD PRESSURE: 151 MMHG | DIASTOLIC BLOOD PRESSURE: 84 MMHG | WEIGHT: 171 LBS | BODY MASS INDEX: 26.78 KG/M2

## 2022-05-24 DIAGNOSIS — Z34.81 MULTIGRAVIDA IN FIRST TRIMESTER: Primary | ICD-10-CM

## 2022-05-24 DIAGNOSIS — O98.111 SYPHILIS AFFECTING PREGNANCY IN FIRST TRIMESTER: ICD-10-CM

## 2022-05-24 DIAGNOSIS — O10.919 ESSENTIAL HYPERTENSION IN PREGNANCY: ICD-10-CM

## 2022-05-24 LAB
GLUCOSE UR STRIP-MCNC: NEGATIVE MG/DL
PROT UR STRIP-MCNC: ABNORMAL MG/DL

## 2022-05-24 PROCEDURE — 99214 OFFICE O/P EST MOD 30 MIN: CPT | Performed by: OBSTETRICS & GYNECOLOGY

## 2022-05-24 RX ORDER — ASPIRIN 81 MG/1
81 TABLET ORAL DAILY
Qty: 30 TABLET | Refills: 5 | Status: SHIPPED | OUTPATIENT
Start: 2022-05-24 | End: 2022-11-04 | Stop reason: HOSPADM

## 2022-05-24 RX ORDER — LABETALOL 200 MG/1
200 TABLET, FILM COATED ORAL 2 TIMES DAILY
Qty: 60 TABLET | Refills: 1 | Status: ON HOLD | OUTPATIENT
Start: 2022-05-24 | End: 2022-08-11

## 2022-05-24 NOTE — PROGRESS NOTES
Cc:  Pregnancy follow up.  Patient c/o swelling in hands/legs, she also c/o being dizzy.  No bleeding or spotting. Patient reports decreasing her caffeine intake but has not increased water intake.  Patient is considering genetic testing; she does not have a significant family history or previously affected infant.  Vitals reviewed by me.  BP noted  Gen - alert and pleasant.  Abdomen - soft, nontender.  A/P:  IUP at 14 weeks with cHTN  - cHTN.  Patient to start Labetalol and baby ASA.  Discussed risks of cHTN and symptoms.  Patient has already had baseline labs.  Follow up BP in 2 weeks.  - Genetic testing.  Patient considering and will decide at next visit.  Screening also occurs in form of sonogram, which patient will undergo at 19 weeks.  - Discussed maternal well being.

## 2022-06-07 ENCOUNTER — ROUTINE PRENATAL (OUTPATIENT)
Dept: OBSTETRICS AND GYNECOLOGY | Facility: CLINIC | Age: 22
End: 2022-06-07

## 2022-06-07 VITALS — WEIGHT: 174 LBS | BODY MASS INDEX: 27.25 KG/M2 | DIASTOLIC BLOOD PRESSURE: 85 MMHG | SYSTOLIC BLOOD PRESSURE: 135 MMHG

## 2022-06-07 DIAGNOSIS — O10.919 ESSENTIAL HYPERTENSION IN PREGNANCY: ICD-10-CM

## 2022-06-07 DIAGNOSIS — R51.9 HEADACHE IN PREGNANCY, ANTEPARTUM, SECOND TRIMESTER: ICD-10-CM

## 2022-06-07 DIAGNOSIS — O99.712: ICD-10-CM

## 2022-06-07 DIAGNOSIS — Z34.82 MULTIGRAVIDA IN SECOND TRIMESTER: Primary | ICD-10-CM

## 2022-06-07 DIAGNOSIS — O26.892 HEADACHE IN PREGNANCY, ANTEPARTUM, SECOND TRIMESTER: ICD-10-CM

## 2022-06-07 DIAGNOSIS — Z3A.16 16 WEEKS GESTATION OF PREGNANCY: ICD-10-CM

## 2022-06-07 DIAGNOSIS — O98.112: ICD-10-CM

## 2022-06-07 DIAGNOSIS — O21.9 PREGNANCY RELATED NAUSEA AND VOMITING, ANTEPARTUM: ICD-10-CM

## 2022-06-07 LAB
GLUCOSE UR STRIP-MCNC: NEGATIVE MG/DL
PROT UR STRIP-MCNC: ABNORMAL MG/DL

## 2022-06-07 PROCEDURE — 99214 OFFICE O/P EST MOD 30 MIN: CPT | Performed by: OBSTETRICS & GYNECOLOGY

## 2022-06-07 NOTE — PROGRESS NOTES
Cc:  Pregnancy follow up.  Patient with c/o headaches x1 week - she has not tried any medication and headaches are described as mild to moderate.  In addition, she has had 4 separate episodes of dizziness and vomiting.  Patient is taking antihypertensive medications.  She is temporarily moving to [place] over the summer and plans to return for her visits; she reports returning to live in [place] at end of summer.  BP noted and looks good.  Gen - alert and pleasant.  Abdomen - gravid, nontender  A/P:  IUP at 16+ weeks with cHTN, headache, nausea, skin rash, syphilis.  - cHTN.  BP stable.  Continue Labetalol and baby ASA.  - Headache.  BP looks good so unlikely related to HTN.  Trial of Tylenol as patient has not tried any treatments.  - Nausea.  Patient to resume Zofran.  - Skin rash.  Reassurance given that pigmentation and rashes are common in pregnancy.  - Syphilis.  Repeat titer at next visit.  - Discussed maternal well being and need for sonogram to evaluate anatomy and congenital syphilis.

## 2022-06-23 ENCOUNTER — ROUTINE PRENATAL (OUTPATIENT)
Dept: OBSTETRICS AND GYNECOLOGY | Facility: CLINIC | Age: 22
End: 2022-06-23

## 2022-06-23 VITALS — WEIGHT: 178 LBS | BODY MASS INDEX: 27.87 KG/M2 | SYSTOLIC BLOOD PRESSURE: 116 MMHG | DIASTOLIC BLOOD PRESSURE: 75 MMHG

## 2022-06-23 DIAGNOSIS — O10.919 ESSENTIAL HYPERTENSION IN PREGNANCY: ICD-10-CM

## 2022-06-23 DIAGNOSIS — N89.8 VAGINAL DISCHARGE DURING PREGNANCY IN SECOND TRIMESTER: ICD-10-CM

## 2022-06-23 DIAGNOSIS — O98.112: ICD-10-CM

## 2022-06-23 DIAGNOSIS — Z3A.19 19 WEEKS GESTATION OF PREGNANCY: ICD-10-CM

## 2022-06-23 DIAGNOSIS — R30.0 DYSURIA: ICD-10-CM

## 2022-06-23 DIAGNOSIS — Z34.82 MULTIGRAVIDA IN SECOND TRIMESTER: Primary | ICD-10-CM

## 2022-06-23 DIAGNOSIS — O26.892 VAGINAL DISCHARGE DURING PREGNANCY IN SECOND TRIMESTER: ICD-10-CM

## 2022-06-23 LAB
BILIRUB BLD-MCNC: NEGATIVE MG/DL
EXPIRATION DATE: ABNORMAL
GLUCOSE UR STRIP-MCNC: NEGATIVE MG/DL
GLUCOSE UR STRIP-MCNC: NEGATIVE MG/DL
KETONES UR QL: NEGATIVE
LEUKOCYTE EST, POC: ABNORMAL
Lab: ABNORMAL
NITRITE UR-MCNC: NEGATIVE MG/ML
PH UR: 7 [PH] (ref 5–8)
PROT UR STRIP-MCNC: ABNORMAL MG/DL
PROT UR STRIP-MCNC: NEGATIVE MG/DL
RBC # UR STRIP: NEGATIVE /UL
SP GR UR: 1.01 (ref 1–1.03)
UROBILINOGEN UR QL: NORMAL

## 2022-06-23 PROCEDURE — 99214 OFFICE O/P EST MOD 30 MIN: CPT | Performed by: OBSTETRICS & GYNECOLOGY

## 2022-06-23 NOTE — PROGRESS NOTES
Cc:  Pregnancy follow up.  Patient c/o green vaginal discharge x1 week, she has not tried any over the counter medication.  In addition, she reports dysuria and frequency.  No spotting or bleeding.  Patient with good fetal movement.   Patient states she is leaving this weekend for her temporary move; however, she plans to return to [place] for her prenatal visits.  Vitals reviewed by me.  Gen - alert and pleasant.  Abdomen - gravid, nontender, no guarding or rebound.  Pelvic - cottage cheese-like discharge.  CC U/A Trace of Leukocytes.  Ultrasound with normal anatomy.  No hepatosplenomegaly/placental thickening.  A/P:  IUP at 19 weeks with gestational discharge, syphilis in pregnancy, chronic HTN, dysuria  - Gestational discharge.  Swab performed.  Await results.  - Dysuria.  Culture ordered.  Urine dipstick nonspecific.  - Syphilis.  RPR today.  Ultrasound appears normal without evidence of congenital syphilis.  - HTN.  Patient reports that she has not taken her aspirin.  She should resume this.  - Discussed maternal well being.

## 2022-06-24 ENCOUNTER — TELEPHONE (OUTPATIENT)
Dept: OBSTETRICS AND GYNECOLOGY | Facility: CLINIC | Age: 22
End: 2022-06-24

## 2022-06-24 LAB
RPR SER QL: REACTIVE
RPR SER-TITR: ABNORMAL {TITER}

## 2022-06-24 NOTE — TELEPHONE ENCOUNTER
Lianna    Let her know that her titers continue to drop with last titer 1:8.  We will do another one in 3 to 4 months.    Danielito

## 2022-06-24 NOTE — TELEPHONE ENCOUNTER
Spoke with Debbie to let her know that Dr Esteves said that her titers continue to drop with last titer 1:8.  Dr Esteves said that we will repeat one in 3-4 months.

## 2022-06-25 LAB
BACTERIA UR CULT: NORMAL
BACTERIA UR CULT: NORMAL

## 2022-08-05 ENCOUNTER — HOSPITAL ENCOUNTER (EMERGENCY)
Facility: HOSPITAL | Age: 22
Discharge: HOME | End: 2022-08-05
Attending: OBSTETRICS & GYNECOLOGY | Admitting: OBSTETRICS & GYNECOLOGY

## 2022-08-05 VITALS
BODY MASS INDEX: 27.88 KG/M2 | SYSTOLIC BLOOD PRESSURE: 123 MMHG | TEMPERATURE: 97.7 F | RESPIRATION RATE: 16 BRPM | HEIGHT: 67 IN | DIASTOLIC BLOOD PRESSURE: 75 MMHG | OXYGEN SATURATION: 100 % | HEART RATE: 81 BPM

## 2022-08-05 LAB
BILIRUB UR QL STRIP: NEGATIVE
CLARITY UR: CLEAR
COLOR UR: YELLOW
GLUCOSE UR STRIP-MCNC: NEGATIVE MG/DL
HGB UR QL STRIP.AUTO: NEGATIVE
KETONES UR QL STRIP: ABNORMAL
LEUKOCYTE ESTERASE UR QL STRIP.AUTO: NEGATIVE
NITRITE UR QL STRIP: NEGATIVE
PH UR STRIP.AUTO: 6.5 [PH] (ref 5–8)
PROT UR QL STRIP: ABNORMAL
SP GR UR STRIP: >=1.03 (ref 1–1.03)
UROBILINOGEN UR QL STRIP: ABNORMAL

## 2022-08-05 PROCEDURE — 87086 URINE CULTURE/COLONY COUNT: CPT | Performed by: OBSTETRICS & GYNECOLOGY

## 2022-08-05 PROCEDURE — 99283 EMERGENCY DEPT VISIT LOW MDM: CPT | Performed by: OBSTETRICS & GYNECOLOGY

## 2022-08-05 PROCEDURE — 59025 FETAL NON-STRESS TEST: CPT

## 2022-08-05 PROCEDURE — 81003 URINALYSIS AUTO W/O SCOPE: CPT | Performed by: OBSTETRICS & GYNECOLOGY

## 2022-08-05 PROCEDURE — 59025 FETAL NON-STRESS TEST: CPT | Performed by: OBSTETRICS & GYNECOLOGY

## 2022-08-06 LAB — BACTERIA SPEC AEROBE CULT: NO GROWTH

## 2022-08-06 NOTE — OBED NOTES
BRIDGER Note OB        Patient Name: Debbie Bauman  YOB: 2000  MRN: [id number]  Admission Date: 2022  8:43 PM  Date of Service: 2022    Chief Complaint: Dizziness (BRIDGER-- pt c/o diffculty breathing, almost blacking out at the airport, has had some yellowish green discharge, occasional lower abdominal cramping. Pt also states she had chest pain last night that has gone away. Pt states +FM and denies LOF, or VB. )        Subjective     Debbie Bauman is a 21 y.o. female  at 25w2d with Estimated Date of Delivery: 22 who presents with the chief complaint listed above.  She sees Anthony Esteves,* for her prenatal care. Her pregnancy has been complicated by:  chronic hypertension, syphilis infection (currently undergoing treatmetn for late latent syphilis), history of substance abuse, history of depression.    Patient here due to episode she had earlier today while waiting to board a plane.  She reports waiting in line to board and suddenly feeling dizzy and lightheaded.  She felt dizzy and began to see black and sat down.  She also felt heart palpitations.  She did not pass out or fall and symptoms passed a few minutes after she sat down.  She was then able to board the plane and come to [place].  She denies any symptoms currently and feels well apart from some occasional shortness of breath but she reports this is not a new symptom.  She is no longer dizzy.      She describes fetal movement as normal.  She denies rupture of membranes.  She denies vaginal bleeding. She is not feeling contractions.          Objective   Patient Active Problem List    Diagnosis    • Late latent syphilis [A52.8]    • Polysubstance abuse (HCC) [F19.10]    • Seasonal allergic rhinitis due to pollen [J30.1]    • Dyspepsia [R10.13]    • Cyst of left ovary [N83.202]    • Major depressive disorder [F32.9]    • False positive serology for HIV [Z78.9]    • Menorrhagia with irregular cycle  [N92.1]    • Family history of diabetes mellitus [Z83.3]         OB History    Para Term  AB Living   3 1 1 0 1 1   SAB IAB Ectopic Molar Multiple Live Births   1 0 0 0 0 1      # Outcome Date GA Lbr Jaiden/2nd Weight Sex Delivery Anes PTL Lv   3 Current            2 Term 18 37w1d 04:42 / 00:18 2960 g (6 lb 8.4 oz) M Vag-Spont EPI N TONEY      Birth Comments: Scale 2      Name: BRIDGER OLIVAS      Apgar1: 9  Apgar5: 9   1 SAB                 Past Medical History:   Diagnosis Date   • ADHD (attention deficit hyperactivity disorder)    • Chlamydia        • Chronic hypertension in pregnancy 2018   • COVID-19 2022   • Depression    • Dizziness 2018   • Essential hypertension 2018   • Heart palpitations 2018   • Hypertension    • Inattention 2017   • Increased thirst 2018   • Late latent syphilis 1/3/2022   • Major depression, recurrent (HCC) 2020   • Menstrual cycle problem 2018   • Pain in radius 2017   • Varicella        Past Surgical History:   Procedure Laterality Date   • TONSILLECTOMY     • WISDOM TOOTH EXTRACTION         No current facility-administered medications on file prior to encounter.     Current Outpatient Medications on File Prior to Encounter   Medication Sig Dispense Refill   • aspirin (aspirin) 81 MG EC tablet Take 1 tablet by mouth Daily. 30 tablet 5   • Prenatal Vit-Fe Fumarate-FA (prenatal vitamin 28-0.8) 28-0.8 MG tablet tablet Take 1 tablet by mouth Daily. 30 tablet 11   • doxylamine-pyridoxine (DICLEGIS) 10-10 MG tablet delayed-release EC tablet Take 2 tablets by mouth At Night As Needed (For nausea and vomiting). 60 tablet 0   • labetalol (NORMODYNE) 200 MG tablet Take 1 tablet by mouth 2 (Two) Times a Day. (Patient not taking: Reported on 2022) 60 tablet 1   • ondansetron ODT (Zofran ODT) 4 MG disintegrating tablet Place 1 tablet on the tongue Every 8 (Eight) Hours As Needed for Nausea or Vomiting. 20 tablet 1   •  promethazine (PHENERGAN) 12.5 MG tablet Take 1 tablet by mouth Every 6 (Six) Hours As Needed for Nausea or Vomiting. 20 tablet 0   • promethazine (PHENERGAN) 25 MG suppository Insert 1 suppository into the rectum Every 6 (Six) Hours As Needed for Nausea or Vomiting. 30 suppository 0       Allergies   Allergen Reactions   • Bee Venom Swelling       Family History   Problem Relation Age of Onset   • Heart attack Father    • Diabetes Father    • Hypertension Father    • Ovarian cancer Mother    • Stroke Paternal Grandfather    • Heart disease Maternal Grandmother        Social History     Socioeconomic History   • Marital status: Legally    • Number of children: 0   Tobacco Use   • Smoking status: Former Smoker     Packs/day: 0.10     Types: Cigarettes     Quit date: 3/28/2018     Years since quittin.3   • Smokeless tobacco: Never Used   Vaping Use   • Vaping Use: Some days   • Substances: THC   Substance and Sexual Activity   • Alcohol use: Not Currently     Comment: occasionally   • Drug use: Yes     Frequency: 2.0 times per week     Types: Marijuana     Comment: occasionally, did have some this week    • Sexual activity: Yes     Partners: Male           Review of Systems   Constitutional: Negative for chills, fatigue and fever.   HENT: Negative for congestion, rhinorrhea and sore throat.    Eyes: Negative for visual disturbance.   Respiratory: Negative.    Cardiovascular: Negative.    Gastrointestinal: Negative for abdominal pain, constipation, diarrhea, nausea and vomiting.   Genitourinary: Negative for difficulty urinating, dyspareunia, dysuria, flank pain, frequency, genital sores, hematuria, pelvic pain, urgency, vaginal bleeding, vaginal discharge and vaginal pain.   Neurological: Positive for dizziness. Negative for seizures, light-headedness and headaches.   Psychiatric/Behavioral: Negative for sleep disturbance. The patient is not nervous/anxious.           PHYSICAL EXAM:      VITAL  "SIGNS:  Vitals:    22 2110   BP: 123/75   BP Location: Right arm   Patient Position: Lying   Pulse: 81   Resp: 16   Temp: 97.7 °F (36.5 °C)   TempSrc: Oral   SpO2: 100%   Height: 170.2 cm (67\")        FHT'S:                   Baseline:  145 BPM  Variability:  Moderate = 6 - 25 BPM  Accelerations:  15 x 15 accelerations present     Decelerations:  absent  Contractions:   absent     Interpretation:    Reactive NST, CAT 1 tracing        PHYSICAL EXAM:    General: well developed; well nourished  no acute distress   Heart: Not performed.   Lungs  : breathing is unlabored     Abdomen: soft, non-tender; no masses  no umbilical or inguinal hernias are present  no hepato-splenomegaly       Cervix: was not checked.      Contractions: none        Extremities: peripheral pulses normal, no pedal edema, no clubbing or cyanosis      LABS AND TESTING ORDERED:  1. Uterine and fetal monitoring  2. Urinalysis      LAB RESULTS:    No results found for this or any previous visit (from the past 24 hour(s)).    Lab Results   Component Value Date    ABO O 2022    RH Positive 2022       Lab Results   Component Value Date    STREPGPB Negative 2018                 Assessment & Plan     ASSESSMENT/PLAN:  Debbie Bauman is a 21 y.o. female  at 25w2d who presented with: episode of near syncope at airport while standing.  Patient's description of incident sounds like a classic vasovagal episode.  She recovered and was symptom free within a few moments of incident and currently feels fine.  She has normal vital signs and fetal status is reassuring.  As she is not having any more symptoms, no further work-up indicated as this can be something that is common in pregnancy.  We discussed not standing for long periods of time and staying hydrated.  She was reassured and discharged home.            Final Impression:  • Pregnancy at 25w2d  • Reactive NST.  CAT 1 tracing  • Vasovagal episode, self-recovered  • Maternal " "vital signs were reviewed and were unremarkable              Vitals:    08/05/22 2110   BP: 123/75   BP Location: Right arm   Patient Position: Lying   Pulse: 81   Resp: 16   Temp: 97.7 °F (36.5 °C)   TempSrc: Oral   SpO2: 100%   Height: 170.2 cm (67\")   •     Lab Results   Component Value Date    STREPGPB Negative 11/26/2018   •   Lab Results   Component Value Date    ABO O 03/31/2022    RH Positive 03/31/2022   •   • COVID - 19 status unknown      PLAN:       I have spent 30 minutes including face to face time with the patient, greater than 50% in discussion of the diagnosis (counseling) and/or coordination of care.     Carolyn Sen MD  8/5/2022  21:28 EDT  OB Hospitalist  Phone:  [phone]  "

## 2022-08-08 ENCOUNTER — TELEPHONE (OUTPATIENT)
Dept: OBSTETRICS AND GYNECOLOGY | Facility: CLINIC | Age: 22
End: 2022-08-08

## 2022-08-08 ENCOUNTER — ROUTINE PRENATAL (OUTPATIENT)
Dept: OBSTETRICS AND GYNECOLOGY | Facility: CLINIC | Age: 22
End: 2022-08-08

## 2022-08-08 VITALS — WEIGHT: 190 LBS | SYSTOLIC BLOOD PRESSURE: 144 MMHG | BODY MASS INDEX: 29.76 KG/M2 | DIASTOLIC BLOOD PRESSURE: 87 MMHG

## 2022-08-08 DIAGNOSIS — R51.9 NONINTRACTABLE HEADACHE, UNSPECIFIED CHRONICITY PATTERN, UNSPECIFIED HEADACHE TYPE: ICD-10-CM

## 2022-08-08 DIAGNOSIS — O10.919 CHRONIC HYPERTENSION AFFECTING PREGNANCY: ICD-10-CM

## 2022-08-08 DIAGNOSIS — Z3A.25 25 WEEKS GESTATION OF PREGNANCY: Primary | ICD-10-CM

## 2022-08-08 DIAGNOSIS — A52.8 LATE LATENT SYPHILIS: ICD-10-CM

## 2022-08-08 DIAGNOSIS — R53.83 OTHER FATIGUE: ICD-10-CM

## 2022-08-08 LAB
GLUCOSE UR STRIP-MCNC: NEGATIVE MG/DL
PROT UR STRIP-MCNC: NEGATIVE MG/DL

## 2022-08-08 PROCEDURE — 99214 OFFICE O/P EST MOD 30 MIN: CPT | Performed by: NURSE PRACTITIONER

## 2022-08-08 NOTE — PROGRESS NOTES
Chief Complaint   Patient presents with   • Routine Prenatal Visit     OB follow up     Debbie Bauman is a 21 y.o.  25w5d being seen today for her obstetrical visit.  Patient reports fatigue and HA since waking up at 2:30. She has not tried any tylenol for HA. Hx CHTN, not taking labetalol or baby ASA. States she was told to stop labetalol. Fetal movement: normal.    Review of Systems  Cramping/contractions: denies  Vaginal bleeding: denies  Fetal movement: normal    /87   Wt 86.2 kg (190 lb)   LMP 2022 (Exact Date)   BMI 29.76 kg/m²     Assessment/Plan    Diagnoses and all orders for this visit:    1. 25 weeks gestation of pregnancy (Primary)    2. Late latent syphilis    3. Chronic hypertension affecting pregnancy    4. Other fatigue    5. Nonintractable headache, unspecified chronicity pattern, unspecified headache type        Reviewed fetal kick counts  Last RPR titer decreasing appropriately. Plan to repeat in aproximately 2 months  Repeat BP by APRN 122/78. Negative proteinuria today. Advised to taken tylenol for HA, call if no improvement. Encouraged to take baby ASA daily   Encouraged to consider covid testing if no improvement in fatigue and HA. Call if positive result  1 hour glucose at next visit.   Reviewed this stage of pregnancy  Problem list updated     Follow up in 4 week(s) with Dr. Esteves    I spent 30 minutes caring for Debbie on this date of service. This time includes time spent by me in the following activities: preparing for the visit, reviewing tests, obtaining and/or reviewing a separately obtained history, performing a medically appropriate examination and/or evaluation, counseling and educating the patient/family/caregiver, ordering medications, tests, or procedures, referring and communicating with other health care professionals and documenting information in the medical record    Lisset Fletcher, APRN  2022  15:29 EDT

## 2022-08-08 NOTE — TELEPHONE ENCOUNTER
Patient reports having tested positive for Covid through home testing.  She has mild SOA and dizziness.  She went to hospital for symptoms and was released, but not tested for Covid.    She had Covid in January and is unvaccinated.    Discussed Paxlovid and she is open to starting.  Discussed pros/cons.

## 2022-08-11 ENCOUNTER — HOSPITAL ENCOUNTER (OUTPATIENT)
Facility: HOSPITAL | Age: 22
End: 2022-08-11
Attending: OBSTETRICS & GYNECOLOGY | Admitting: OBSTETRICS & GYNECOLOGY

## 2022-08-11 ENCOUNTER — DOCUMENTATION (OUTPATIENT)
Dept: OBSTETRICS AND GYNECOLOGY | Facility: CLINIC | Age: 22
End: 2022-08-11

## 2022-08-11 ENCOUNTER — HOSPITAL ENCOUNTER (EMERGENCY)
Facility: HOSPITAL | Age: 22
Discharge: HOME | End: 2022-08-11
Attending: OBSTETRICS & GYNECOLOGY | Admitting: OBSTETRICS & GYNECOLOGY

## 2022-08-11 VITALS
BODY MASS INDEX: 29.82 KG/M2 | DIASTOLIC BLOOD PRESSURE: 66 MMHG | WEIGHT: 190 LBS | HEART RATE: 75 BPM | HEIGHT: 67 IN | RESPIRATION RATE: 15 BRPM | OXYGEN SATURATION: 99 % | SYSTOLIC BLOOD PRESSURE: 116 MMHG | TEMPERATURE: 98 F

## 2022-08-11 LAB
BACTERIA UR QL AUTO: ABNORMAL /HPF
BILIRUB UR QL STRIP: NEGATIVE
CLARITY UR: ABNORMAL
COLOR UR: ABNORMAL
GLUCOSE UR STRIP-MCNC: NEGATIVE MG/DL
HGB UR QL STRIP.AUTO: NEGATIVE
HYALINE CASTS UR QL AUTO: ABNORMAL /LPF
KETONES UR QL STRIP: ABNORMAL
LEUKOCYTE ESTERASE UR QL STRIP.AUTO: ABNORMAL
NITRITE UR QL STRIP: NEGATIVE
PH UR STRIP.AUTO: 7.5 [PH] (ref 5–8)
PROT UR QL STRIP: NEGATIVE
RBC # UR STRIP: ABNORMAL /HPF
REF LAB TEST METHOD: ABNORMAL
SP GR UR STRIP: 1.02 (ref 1–1.03)
SQUAMOUS #/AREA URNS HPF: ABNORMAL /HPF
UROBILINOGEN UR QL STRIP: ABNORMAL
WBC # UR STRIP: ABNORMAL /HPF

## 2022-08-11 PROCEDURE — 81001 URINALYSIS AUTO W/SCOPE: CPT | Performed by: OBSTETRICS & GYNECOLOGY

## 2022-08-11 PROCEDURE — 99283 EMERGENCY DEPT VISIT LOW MDM: CPT | Performed by: OBSTETRICS & GYNECOLOGY

## 2022-08-11 PROCEDURE — 87086 URINE CULTURE/COLONY COUNT: CPT | Performed by: OBSTETRICS & GYNECOLOGY

## 2022-08-11 NOTE — PROGRESS NOTES
Called patient and stated she started out having cramping throughout the day, these last 2 to 3 minutes.  Occur more than once per hour.  She just went to the bathroom and noted bleeding with wiping.  She put on a panty liner does not feel like it is getting saturated.  She reports a complication of hypertension last pregnancy but no complications this pregnancy.  She was recently diagnosed with COVID but reports her symptoms have drast ically improved.  Given the cramping and early gestational age I recommend that she get evaluated on labor and delivery.  We reviewed the location of labor and delivery.  She was provided the phone number to call arrived due to her COVID-positive status.  Patient expressed agreement understanding.

## 2022-08-12 LAB — BACTERIA SPEC AEROBE CULT: NO GROWTH

## 2022-08-12 NOTE — OBED NOTES
[facility]  Debbie Olivas  : 2000  MRN: [id number]  CSN: [id number]    BRIDGER History and Physical    Subjective   Chief Complaint   Patient presents with   • Abdominal Cramping   • Vaginal Bleeding     Pt stated she began cramping in lower abdomen at 1800 today and then went to bathroom and noticed bright red bleeding while wiping.  Pt denies intercourse in past 24 hours.      Debbie Olivas is a 21 y.o. year old  with an Estimated Date of Delivery: 22 currently at 26w1d presenting with the above complaints.  She denies ROM, active vaginal bleeding or contractions.  She was diagnosed with COVID 4 days ago. She is asymptomatic today.    Prenatal care has been with Dr Esteves.  It has been complicated for history of syphilis and chronic HTN.     OB History    Para Term  AB Living   3 1 1 0 1 1   SAB IAB Ectopic Molar Multiple Live Births   1 0 0 0 0 1      # Outcome Date GA Lbr Jaiden/2nd Weight Sex Delivery Anes PTL Lv   3 Current            2 Term 18 37w1d 04:42 / 00:18 2960 g (6 lb 8.4 oz) M Vag-Spont EPI N TONEY      Birth Comments: Scale 2      Name: BRIDGER OLIVAS      Apgar1: 9  Apgar5: 9   1 SAB              Past Medical History:   Diagnosis Date   • Inattention 2017   • Pain in radius 2017   • Menstrual cycle problem 2018   • Increased thirst 2018   • Dizziness 2018   • Essential hypertension 2018   • Heart palpitations 2018   • Chronic hypertension in pregnancy 2018   • Major depression, recurrent (HCC) 2020   • COVID-19 2022   • Late latent syphilis 1/3/2022   • ADHD (attention deficit hyperactivity disorder)    • Chlamydia        • Depression    • Hypertension    • Varicella      Past Surgical History:   Procedure Laterality Date   • TONSILLECTOMY     • WISDOM TOOTH EXTRACTION       No current facility-administered medications for this encounter.    Allergies   Allergen Reactions   • Bee Venom Swelling      Social History    Tobacco Use      Smoking status: Former Smoker        Packs/day: 0.10        Types: Cigarettes        Quit date: 3/28/2018        Years since quittin.3      Smokeless tobacco: Never Used    Review of Systems    Negative except for HPI.  Objective   LMP 2022 (Exact Date)   General: WD, WN, NAD   Abdomen: Soft, nontender   FHT's: 150 baseline      Cervix: L/C speculum, no bleeding noted   Presentation: deferred   Contractions: none   Back: No CVA tenderness     Prenatal Labs  Lab Results   Component Value Date    HGB 14.5 2022    RUBELLAABIGG 9.72 03/15/2022    HEPBSAG Negative 03/15/2022    ABORH O Positive 2018    ABSCRN Negative 03/15/2022    QAJ7ZPD1 Non Reactive 03/15/2022    HEPCVIRUSABY 0.1 03/15/2022    STREPGPB Negative 2018    URINECX No growth 2022    CHLAMNAA Negative 2022    NGONORRHON Negative 2022       Current Labs Reviewed    Latest Reference Range & Units 22 20:28   Color, UA Yellow, Straw  Dark Yellow !   Appearance, UA Clear  Cloudy !   Specific Gravity, UA 1.005 - 1.030  1.022   pH, UA 5.0 - 8.0  7.5   Glucose Negative  Negative   Ketones, UA Negative  Trace !   Blood, UA Negative  Negative   Nitrite, UA Negative  Negative   Leukocytes, UA Negative  Trace !   Protein, UA Negative  Negative   Bilirubin, UA Negative  Negative   Urobilinogen, UA 0.2 - 1.0 E.U./dL  0.2 E.U./dL   RBC, UA None Seen, 0-2 /HPF 0-2   WBC, UA None Seen, 0-2 /HPF 6-12 !   Bacteria, UA None Seen /HPF 1+ !   Squamous Epithelial Cells, UA None Seen, 0-2 /HPF 3-6 !   Hyaline Casts, UA None Seen /LPF 3-6   Methodology:  Automated Microscopy          Assessment   1. IUP at 26w1d  2.   History of Cramping     Plan   1. Discharge  2. Continue to monitor any vaginal bleeding or cramping  3. Check with office for urine C&S     Iveth Leija MD   OB Hospitalist on call  2022

## 2022-08-23 ENCOUNTER — PATIENT OUTREACH (OUTPATIENT)
Dept: LABOR AND DELIVERY | Facility: HOSPITAL | Age: 22
End: 2022-08-23

## 2022-08-23 ENCOUNTER — REFERRAL TRIAGE (OUTPATIENT)
Dept: LABOR AND DELIVERY | Facility: HOSPITAL | Age: 22
End: 2022-08-23

## 2022-08-23 NOTE — OUTREACH NOTE
[facility]  Enrollment    Current Estimated Gestational Age: 27w6d    Questions/Answers    Flowsheet Row Responses   Would like to participate? No            Kaitlynn Yeung RN  Maternity Nurse Navigator    8/23/2022, 15:17 EDT

## 2022-09-08 ENCOUNTER — ROUTINE PRENATAL (OUTPATIENT)
Dept: OBSTETRICS AND GYNECOLOGY | Facility: CLINIC | Age: 22
End: 2022-09-08

## 2022-09-08 VITALS — DIASTOLIC BLOOD PRESSURE: 86 MMHG | WEIGHT: 198 LBS | SYSTOLIC BLOOD PRESSURE: 141 MMHG | BODY MASS INDEX: 31.01 KG/M2

## 2022-09-08 DIAGNOSIS — R10.2 PELVIC PAIN IN PREGNANCY, ANTEPARTUM, THIRD TRIMESTER: ICD-10-CM

## 2022-09-08 DIAGNOSIS — A52.8 LATE LATENT SYPHILIS: ICD-10-CM

## 2022-09-08 DIAGNOSIS — Z3A.30 30 WEEKS GESTATION OF PREGNANCY: ICD-10-CM

## 2022-09-08 DIAGNOSIS — O26.893 PELVIC PAIN IN PREGNANCY, ANTEPARTUM, THIRD TRIMESTER: ICD-10-CM

## 2022-09-08 DIAGNOSIS — Z34.83 MULTIGRAVIDA IN THIRD TRIMESTER: Primary | ICD-10-CM

## 2022-09-08 DIAGNOSIS — O10.919 CHRONIC HYPERTENSION AFFECTING PREGNANCY: ICD-10-CM

## 2022-09-08 LAB
BILIRUB BLD-MCNC: NEGATIVE MG/DL
EXPIRATION DATE: ABNORMAL
GLUCOSE UR STRIP-MCNC: NEGATIVE MG/DL
GLUCOSE UR STRIP-MCNC: NEGATIVE MG/DL
KETONES UR QL: NEGATIVE
LEUKOCYTE EST, POC: ABNORMAL
Lab: ABNORMAL
NITRITE UR-MCNC: NEGATIVE MG/ML
PH UR: 7 [PH] (ref 5–8)
PROT UR STRIP-MCNC: NEGATIVE MG/DL
PROT UR STRIP-MCNC: NEGATIVE MG/DL
RBC # UR STRIP: NEGATIVE /UL
SP GR UR: 1.02 (ref 1–1.03)
UROBILINOGEN UR QL: ABNORMAL

## 2022-09-08 PROCEDURE — 99213 OFFICE O/P EST LOW 20 MIN: CPT | Performed by: OBSTETRICS & GYNECOLOGY

## 2022-09-08 NOTE — PROGRESS NOTES
Cc:  Pregnancy follow up.  Patient c/o pelvic pressure and frequent urination.  She also c/o SOB with activity but not at rest. No palpitations, headaches, chest pain.  Fetal movement is excellent.  Vitals reviewed by me.  Gen - alert and pleasant.  Abdomen - gravid, nontender, no guarding or rebound.  Doing Gtt1 today with repeat of RPR  Ultrasound with normal growth, placenta, KEN.  CC U/A Small Leukocytes.  A/P:  IUP at 30 weeks with cHTN, syphilis pre-pregnancy, SOA, pelvic pain.  - cHTN.  Normal growth.  BP well controlled.  Growth scan in 3 weeks.   testing between 32 and 34 weeks.  - Syphilis history.  RPR today.  Patient with generally falling titers and no evidence of congenital syphilis.  - SOA.  Physiologic.  - Pelvic pain.  Await urine culture.  Discussed maternity belt.  Likely physiologic.

## 2022-09-09 LAB
GLUCOSE 1H P 50 G GLC PO SERPL-MCNC: 117 MG/DL (ref 65–139)
RPR SER QL: REACTIVE
RPR SER-TITR: ABNORMAL {TITER}
TREPONEMA PALLIDUM IGG+IGM AB [PRESENCE] IN SERUM OR PLASMA BY IMMUNOASSAY: REACTIVE

## 2022-09-13 ENCOUNTER — TELEPHONE (OUTPATIENT)
Dept: OBSTETRICS AND GYNECOLOGY | Facility: CLINIC | Age: 22
End: 2022-09-13

## 2022-09-13 LAB — BACTERIA UR CULT: NORMAL

## 2022-09-13 NOTE — TELEPHONE ENCOUNTER
Lianna    Let her know that her titer is stable.  I want her to repeat her RPR titer in 2 to 3 weeks at next visit.  In pregnancy, if the titer does not continue to fall, it can still be ok.  It is recommended just to repeat in this case.    I will go over in more detail at next visit.    Thanks    Danielito

## 2022-09-14 NOTE — TELEPHONE ENCOUNTER
Left message for Debbie that Dr Esteves said your titer is stable. He wants to repeat the RPR titer in 2 to 3 weeks at next visit. In pregnancy, if the titer does not continue to fall, it can still be ok. It is recommended just to repeat in this case. Dr Esteves said he will go over in more detail at your next visit. Thank you.

## 2022-09-23 ENCOUNTER — ROUTINE PRENATAL (OUTPATIENT)
Dept: OBSTETRICS AND GYNECOLOGY | Facility: CLINIC | Age: 22
End: 2022-09-23

## 2022-09-23 VITALS — DIASTOLIC BLOOD PRESSURE: 67 MMHG | WEIGHT: 204.4 LBS | SYSTOLIC BLOOD PRESSURE: 118 MMHG | BODY MASS INDEX: 32.01 KG/M2

## 2022-09-23 DIAGNOSIS — Z3A.32 32 WEEKS GESTATION OF PREGNANCY: Primary | ICD-10-CM

## 2022-09-23 LAB
GLUCOSE UR STRIP-MCNC: NEGATIVE MG/DL
PROT UR STRIP-MCNC: NEGATIVE MG/DL

## 2022-09-23 PROCEDURE — 99213 OFFICE O/P EST LOW 20 MIN: CPT | Performed by: OBSTETRICS & GYNECOLOGY

## 2022-09-23 NOTE — PROGRESS NOTES
CC: Obstetric visit    HPI: 22-year-old  3 para 1 presents at 32-2/7 weeks for her obstetric visit.  Her baby is moving actively.  She had some intermittent pelvic pain last night, but this has completely resolved.    Review of systems    This is positive for contractions, now resolved.  It is negative for vaginal bleeding.  Negative for nausea or vomiting.    Physical examination    The abdomen is soft and gravid.  There is no epigastric tenderness.  No fundal tenderness.  No CVA tenderness.  No suprapubic tenderness.  Pelvic examination reveals normal female external genitalia.  The cervix is thick, closed and posterior.  Extremities are equal in size with 1+ bipedal edema    Assessment and plan    1.  Intrauterine pregnancy at 32-2/7 weeks  2.  History of syphilis.  Last RPR was 1-16.  This is stable.  Titer will be repeated today.  3.  Chronic hypertension.  Blood pressures are excellent.  4.  Biophysical profile is 8 out of 8 with a normal amniotic fluid index.  Counseled.  5.  Episode of contractions last night, now resolved.  Counseled.  Labor warnings given.

## 2022-09-24 LAB
RPR SER QL: ABNORMAL
RPR SER-TITR: ABNORMAL {TITER}

## 2022-09-30 ENCOUNTER — ROUTINE PRENATAL (OUTPATIENT)
Dept: OBSTETRICS AND GYNECOLOGY | Facility: CLINIC | Age: 22
End: 2022-09-30

## 2022-09-30 ENCOUNTER — TELEPHONE (OUTPATIENT)
Dept: OBSTETRICS AND GYNECOLOGY | Facility: CLINIC | Age: 22
End: 2022-09-30

## 2022-09-30 VITALS — WEIGHT: 201 LBS | DIASTOLIC BLOOD PRESSURE: 94 MMHG | BODY MASS INDEX: 31.48 KG/M2 | SYSTOLIC BLOOD PRESSURE: 145 MMHG

## 2022-09-30 DIAGNOSIS — O98.113 SYPHILIS COMPLICATING PREGNANCY IN THIRD TRIMESTER: ICD-10-CM

## 2022-09-30 DIAGNOSIS — Z3A.33 33 WEEKS GESTATION OF PREGNANCY: ICD-10-CM

## 2022-09-30 DIAGNOSIS — Z34.83 MULTIGRAVIDA IN THIRD TRIMESTER: ICD-10-CM

## 2022-09-30 DIAGNOSIS — O10.919 CHRONIC HYPERTENSION AFFECTING PREGNANCY: Primary | ICD-10-CM

## 2022-09-30 DIAGNOSIS — O23.43 URINARY TRACT INFECTION IN MOTHER DURING THIRD TRIMESTER OF PREGNANCY: ICD-10-CM

## 2022-09-30 LAB
BILIRUB BLD-MCNC: NEGATIVE MG/DL
EXPIRATION DATE: ABNORMAL
GLUCOSE UR STRIP-MCNC: NEGATIVE MG/DL
GLUCOSE UR STRIP-MCNC: NEGATIVE MG/DL
KETONES UR QL: NEGATIVE
LEUKOCYTE EST, POC: ABNORMAL
Lab: ABNORMAL
NITRITE UR-MCNC: NEGATIVE MG/ML
PH UR: 7.5 [PH] (ref 5–8)
PROT UR STRIP-MCNC: NEGATIVE MG/DL
PROT UR STRIP-MCNC: NEGATIVE MG/DL
RBC # UR STRIP: NEGATIVE /UL
SP GR UR: 1.02 (ref 1–1.03)
UROBILINOGEN UR QL: ABNORMAL

## 2022-09-30 PROCEDURE — 99214 OFFICE O/P EST MOD 30 MIN: CPT | Performed by: OBSTETRICS & GYNECOLOGY

## 2022-09-30 RX ORDER — AMOXICILLIN 500 MG/1
500 CAPSULE ORAL 3 TIMES DAILY
Qty: 20 CAPSULE | Refills: 0 | Status: SHIPPED | OUTPATIENT
Start: 2022-09-30 | End: 2022-10-07

## 2022-09-30 NOTE — PROGRESS NOTES
Cc:  Pregnancy follow up.  Patient with decreased appetite down 3 lbs from last visit. She has had some pelvic cramping with frequent urination. No headache or visual symptoms.  Fetal movement present daily.  No movement or sensory disturbances.  Vitals reviewed by me.  Borderline BP noted.  Gen - alert and pleasant.  Abdomen - gravid, nontender, no guarding or rebound.  CC U/A  Small Leukocytes.  Ultrasound with normal growth, KEN and BPP  RPR 1:16 for 2 consecutive blood draw, 2 weeks apart.  A/P:  IUP at 33 weeks with cHTN, syphilis history.  - cHTN.  Screen for pre-eclampsia with CBC, CMP and protein/creatinine ratio.  Weekly  testing.  Patient to follow up weekly.  - Syphilis.  Patient unsure if partner has had titer check to see if titers are falling, as they were both treated.  Will repeat PCN weekly for 3 weeks thru triage labor and delivery.  First dose on Monday.  Referral to Infectious Disease.  - Discussed maternal well being.

## 2022-10-01 ENCOUNTER — TELEPHONE (OUTPATIENT)
Dept: OBSTETRICS AND GYNECOLOGY | Facility: CLINIC | Age: 22
End: 2022-10-01

## 2022-10-01 LAB
ALBUMIN SERPL-MCNC: 3.8 G/DL (ref 3.5–5.2)
ALBUMIN/GLOB SERPL: 1.6 G/DL
ALP SERPL-CCNC: 116 U/L (ref 39–117)
ALT SERPL-CCNC: 9 U/L (ref 1–33)
AST SERPL-CCNC: 10 U/L (ref 1–32)
BILIRUB SERPL-MCNC: <0.2 MG/DL (ref 0–1.2)
BUN SERPL-MCNC: 7 MG/DL (ref 6–20)
BUN/CREAT SERPL: 12.1 (ref 7–25)
CALCIUM SERPL-MCNC: 9.3 MG/DL (ref 8.6–10.5)
CHLORIDE SERPL-SCNC: 104 MMOL/L (ref 98–107)
CO2 SERPL-SCNC: 23.5 MMOL/L (ref 22–29)
CREAT SERPL-MCNC: 0.58 MG/DL (ref 0.57–1)
CREAT UR-MCNC: 86.2 MG/DL
EGFRCR SERPLBLD CKD-EPI 2021: 129.8 ML/MIN/1.73
ERYTHROCYTE [DISTWIDTH] IN BLOOD BY AUTOMATED COUNT: 12.5 % (ref 12.3–15.4)
GLOBULIN SER CALC-MCNC: 2.4 GM/DL
GLUCOSE SERPL-MCNC: 98 MG/DL (ref 65–99)
HCT VFR BLD AUTO: 34.5 % (ref 34–46.6)
HGB BLD-MCNC: 11.9 G/DL (ref 12–15.9)
MCH RBC QN AUTO: 29.1 PG (ref 26.6–33)
MCHC RBC AUTO-ENTMCNC: 34.5 G/DL (ref 31.5–35.7)
MCV RBC AUTO: 84.4 FL (ref 79–97)
PLATELET # BLD AUTO: 244 10*3/MM3 (ref 140–450)
POTASSIUM SERPL-SCNC: 4.1 MMOL/L (ref 3.5–5.2)
PROT SERPL-MCNC: 6.2 G/DL (ref 6–8.5)
PROT UR-MCNC: 8.1 MG/DL
PROT/CREAT UR: 94 MG/G CREA (ref 0–200)
RBC # BLD AUTO: 4.09 10*6/MM3 (ref 3.77–5.28)
SODIUM SERPL-SCNC: 140 MMOL/L (ref 136–145)
WBC # BLD AUTO: 12.27 10*3/MM3 (ref 3.4–10.8)

## 2022-10-03 ENCOUNTER — HOSPITAL ENCOUNTER (OUTPATIENT)
Facility: HOSPITAL | Age: 22
Discharge: HOME | End: 2022-10-03
Attending: OBSTETRICS & GYNECOLOGY | Admitting: STUDENT IN AN ORGANIZED HEALTH CARE EDUCATION/TRAINING PROGRAM

## 2022-10-03 VITALS
RESPIRATION RATE: 16 BRPM | DIASTOLIC BLOOD PRESSURE: 71 MMHG | OXYGEN SATURATION: 98 % | TEMPERATURE: 98.3 F | HEART RATE: 96 BPM | SYSTOLIC BLOOD PRESSURE: 124 MMHG

## 2022-10-03 DIAGNOSIS — A52.8 LATE LATENT SYPHILIS: Primary | ICD-10-CM

## 2022-10-03 PROCEDURE — 96372 THER/PROPH/DIAG INJ SC/IM: CPT

## 2022-10-03 PROCEDURE — 59020 FETAL CONTRACT STRESS TEST: CPT

## 2022-10-03 PROCEDURE — G0378 HOSPITAL OBSERVATION PER HR: HCPCS

## 2022-10-03 PROCEDURE — 25010000002 PENICILLIN G BENZATHINE PER 2400000 UNITS: Performed by: OBSTETRICS & GYNECOLOGY

## 2022-10-03 RX ORDER — SODIUM CHLORIDE 9 MG/ML
250 INJECTION, SOLUTION INTRAVENOUS ONCE
Status: DISCONTINUED | OUTPATIENT
Start: 2022-10-03 | End: 2022-10-03

## 2022-10-03 RX ORDER — SODIUM CHLORIDE 9 MG/ML
250 INJECTION, SOLUTION INTRAVENOUS ONCE
Status: CANCELLED | OUTPATIENT
Start: 2022-10-03

## 2022-10-03 RX ADMIN — PENICILLIN G BENZATHINE 2.4 MILLION UNITS: 2400000 INJECTION, SUSPENSION INTRAMUSCULAR at 10:44

## 2022-10-03 NOTE — TELEPHONE ENCOUNTER
Lianna    Let her know that her pre-eclampsia, blood pressure labs were normal and good.    She is scheduled to go to labor and delivery at 10 am on Monday for weekly penicillin injections.    Thanks.    Danielito  
Spoke with Debbie to let her know that Dr Esteves said that your pre-eclampsia, blood pressure labs were good and normal. You are scheduled to go to [facility] weekly for penicillin injections. She is there now. Thank you.  
No

## 2022-10-03 NOTE — TELEPHONE ENCOUNTER
Referral set for scheduling w/[facility]-requeusted appt for this week or next.  Pt scheduled for PCN inj at infusion center for 10/3/22.

## 2022-10-03 NOTE — NON STRESS TEST
Debbie Bauman, a  at 33w5d with an KIESHA of 2022, by Last Menstrual Period, was seen at [facility] LABOR DELIVERY for a nonstress test.    Chief Complaint   Patient presents with   • Injections     Outpt: pt here for PCN injection.  +FM. Denies any complaints at this time.        Patient Active Problem List   Diagnosis   • Family history of diabetes mellitus   • Menorrhagia with irregular cycle   • Major depressive disorder   • False positive serology for HIV   • Cyst of left ovary   • Polysubstance abuse (HCC)   • Seasonal allergic rhinitis due to pollen   • Dyspepsia   • Late latent syphilis       Start Time: 1007  Stop Time: 1042    Interpretation A  Nonstress Test Interpretation A: Reactive

## 2022-10-05 LAB
BACTERIA UR CULT: NORMAL
BACTERIA UR CULT: NORMAL

## 2022-10-07 ENCOUNTER — ROUTINE PRENATAL (OUTPATIENT)
Dept: OBSTETRICS AND GYNECOLOGY | Facility: CLINIC | Age: 22
End: 2022-10-07

## 2022-10-07 VITALS — DIASTOLIC BLOOD PRESSURE: 92 MMHG | SYSTOLIC BLOOD PRESSURE: 155 MMHG | WEIGHT: 206 LBS | BODY MASS INDEX: 32.26 KG/M2

## 2022-10-07 DIAGNOSIS — O26.893 VAGINAL DISCHARGE DURING PREGNANCY IN THIRD TRIMESTER: ICD-10-CM

## 2022-10-07 DIAGNOSIS — Z34.83 MULTIGRAVIDA IN THIRD TRIMESTER: ICD-10-CM

## 2022-10-07 DIAGNOSIS — O10.919 CHRONIC HYPERTENSION AFFECTING PREGNANCY: Primary | ICD-10-CM

## 2022-10-07 DIAGNOSIS — Z3A.34 34 WEEKS GESTATION OF PREGNANCY: ICD-10-CM

## 2022-10-07 DIAGNOSIS — O98.113 SYPHILIS COMPLICATING PREGNANCY IN THIRD TRIMESTER: ICD-10-CM

## 2022-10-07 DIAGNOSIS — N89.8 VAGINAL DISCHARGE DURING PREGNANCY IN THIRD TRIMESTER: ICD-10-CM

## 2022-10-07 LAB
GLUCOSE UR STRIP-MCNC: NEGATIVE MG/DL
PROT UR STRIP-MCNC: NEGATIVE MG/DL

## 2022-10-07 PROCEDURE — 99214 OFFICE O/P EST MOD 30 MIN: CPT | Performed by: OBSTETRICS & GYNECOLOGY

## 2022-10-07 NOTE — PROGRESS NOTES
Cc:  Pregnancy follow up.  Patient c/o vaginal itching with discharge and burning.  She received first PCN injection last Monday in series of 3, for re-treatment of syphilis.  It is unclear if she is a treatment failure, recurrent infection or false positive as her RPR plateau'ed after falling for several checks.  She was checking RPR each trimester since her treatment last year.  Her ultrasounds have been unremarkable with no evidence of congenital syphilis.  She has good FM.  She feels well overall.  Vitals reviewed.  Repeat /88.  Gen - alert and pleasant.  Abdomen - gravid, nontender.  Pelvic - discharge noted.  CBC, CMP and protein/creatinine ratio normal last week.  BPP 8/8 today with normal KEN.  A/P:  IUP at 34 weeks with gestational discharge, cHTN in pregnancy, syphilis.  - Discharge.  Await cultures.  - cHTN.  Improved after recheck with negative work up in past 7 days for pre-eclampsia.  Will repeat at least twice next week.  - Syphilis.  PCN on Monday at hospital.  Has ID consult next Wednesday.  - Discussed maternal well being.

## 2022-10-10 ENCOUNTER — TELEPHONE (OUTPATIENT)
Dept: OBSTETRICS AND GYNECOLOGY | Facility: CLINIC | Age: 22
End: 2022-10-10

## 2022-10-10 ENCOUNTER — HOSPITAL ENCOUNTER (OUTPATIENT)
Facility: HOSPITAL | Age: 22
End: 2022-10-10
Attending: STUDENT IN AN ORGANIZED HEALTH CARE EDUCATION/TRAINING PROGRAM | Admitting: STUDENT IN AN ORGANIZED HEALTH CARE EDUCATION/TRAINING PROGRAM

## 2022-10-10 ENCOUNTER — HOSPITAL ENCOUNTER (OUTPATIENT)
Facility: HOSPITAL | Age: 22
Discharge: HOME | End: 2022-10-10
Attending: STUDENT IN AN ORGANIZED HEALTH CARE EDUCATION/TRAINING PROGRAM | Admitting: OBSTETRICS & GYNECOLOGY

## 2022-10-10 VITALS
SYSTOLIC BLOOD PRESSURE: 115 MMHG | TEMPERATURE: 98.1 F | DIASTOLIC BLOOD PRESSURE: 76 MMHG | HEART RATE: 95 BPM | RESPIRATION RATE: 16 BRPM

## 2022-10-10 DIAGNOSIS — A52.8 LATE LATENT SYPHILIS: Primary | ICD-10-CM

## 2022-10-10 PROCEDURE — G0463 HOSPITAL OUTPT CLINIC VISIT: HCPCS

## 2022-10-10 PROCEDURE — G0378 HOSPITAL OBSERVATION PER HR: HCPCS

## 2022-10-10 PROCEDURE — 59025 FETAL NON-STRESS TEST: CPT

## 2022-10-10 PROCEDURE — 96372 THER/PROPH/DIAG INJ SC/IM: CPT

## 2022-10-10 PROCEDURE — 25010000002 PENICILLIN G BENZATHINE PER 2400000 UNITS: Performed by: OBSTETRICS & GYNECOLOGY

## 2022-10-10 RX ORDER — SODIUM CHLORIDE 9 MG/ML
250 INJECTION, SOLUTION INTRAVENOUS ONCE
Status: DISCONTINUED | OUTPATIENT
Start: 2022-10-10 | End: 2022-10-10 | Stop reason: HOSPADM

## 2022-10-10 RX ORDER — SODIUM CHLORIDE 9 MG/ML
250 INJECTION, SOLUTION INTRAVENOUS ONCE
OUTPATIENT
Start: 2022-10-10

## 2022-10-10 RX ADMIN — PENICILLIN G BENZATHINE 2.4 MILLION UNITS: 2400000 INJECTION, SUSPENSION INTRAMUSCULAR at 14:37

## 2022-10-12 ENCOUNTER — OFFICE VISIT (OUTPATIENT)
Dept: INFECTIOUS DISEASES | Facility: CLINIC | Age: 22
End: 2022-10-12

## 2022-10-12 VITALS
HEIGHT: 68 IN | TEMPERATURE: 97.9 F | WEIGHT: 207.2 LBS | SYSTOLIC BLOOD PRESSURE: 136 MMHG | BODY MASS INDEX: 31.4 KG/M2 | HEART RATE: 118 BPM | DIASTOLIC BLOOD PRESSURE: 85 MMHG | RESPIRATION RATE: 18 BRPM

## 2022-10-12 DIAGNOSIS — O98.113 SYPHILIS AFFECTING PREGNANCY IN THIRD TRIMESTER: Primary | ICD-10-CM

## 2022-10-12 PROCEDURE — 99204 OFFICE O/P NEW MOD 45 MIN: CPT | Performed by: INTERNAL MEDICINE

## 2022-10-12 NOTE — PROGRESS NOTES
"cc: This is a very nice 22-year-old who is now about 35 weeks pregnant we are asked to see for syphilis.  Briefly, she says she had an infectious illness in December 2021 around June or July of that year notable for a rash on her thighs and also her palms ulcers on her tongue sweating weight loss that resolved after about 1 month.  She did not take antibiotics at that time.  She had a new sexual partner during that episode.  No genital ulcers.  Her primary care doctor was concerned for potential hand-foot-and-mouth disease.  She underwent RPR testing in December 2021 which was positive at 1:64 and was treated with 3 doses of intramuscular penicillin.  Subsequent RPR's had declined fourfold although most recent RPR was slightly higher and she initiated another      Blood pressure 136/85, pulse 118, temperature 97.9 °F (36.6 °C), temperature source Temporal, resp. rate 18, height 172.7 cm (68\"), weight 94 kg (207 lb 3.2 oz), last menstrual period 02/09/2022, not currently breastfeeding.  GENERAL: Awake and alert, in no acute distress.   HEENT: . Hearing is grossly normal.   LUNGS:  with normal respiratory effort.   No rash exposed areas  PSYCHIATRIC: Appropriate mood, affect, insight, and judgment.       DIAGNOSTICS:  RPR testing  --10/25/2019 negative  --12/20/2021 positive 1:64 (tpa antibodies neg)  -- 3/15/2022 positive 1:8  -- 6/23/2022 pos 1:8  -- 9/8/2022 positive 1:16  --9/23/2022 positive 1:16    Syphilis antibody negative on July 30, 2022 March 2022 HIV negative      Assessment and Plan  1.  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  She was treated for late latent syphilis in Jan 2022 with 3 doses of benzathine penicillin.  Subsequent RPR is declining fourfold down to a peak a beck of 1:8 with slight increase on most subsequent titers of 1:16.  She denies any new exposures, reinfection or new symptoms.  The most recent titers remain fourfold decrease from her repeat titers " and therefore are indicative of successful treatment of syphilis.    Because the most recent titers have not increased fourfold, these are most likely slightly higher due to technical issues with performing the test.  There is a lot of variability in performing the dilutions and just a one fold dilution in titers is not unexpected.  Even if she had been reexposed to syphilis, this would represent early latent syphilis and treatment would just be 1 dose of penicillin.  She has now had 5 doses of penicillin within the last year and would not recommend any additional penicillin.    Would recommend checking RPR titers next in December 2022 and then yearly thereafter

## 2022-10-13 ENCOUNTER — ROUTINE PRENATAL (OUTPATIENT)
Dept: OBSTETRICS AND GYNECOLOGY | Facility: CLINIC | Age: 22
End: 2022-10-13

## 2022-10-13 ENCOUNTER — TELEPHONE (OUTPATIENT)
Dept: OBSTETRICS AND GYNECOLOGY | Facility: CLINIC | Age: 22
End: 2022-10-13

## 2022-10-13 ENCOUNTER — PATIENT ROUNDING (BHMG ONLY) (OUTPATIENT)
Dept: INFECTIOUS DISEASES | Facility: CLINIC | Age: 22
End: 2022-10-13

## 2022-10-13 VITALS — SYSTOLIC BLOOD PRESSURE: 139 MMHG | BODY MASS INDEX: 31.32 KG/M2 | DIASTOLIC BLOOD PRESSURE: 85 MMHG | WEIGHT: 206 LBS

## 2022-10-13 DIAGNOSIS — O10.919 CHRONIC HYPERTENSION AFFECTING PREGNANCY: ICD-10-CM

## 2022-10-13 DIAGNOSIS — Z34.83 MULTIGRAVIDA IN THIRD TRIMESTER: Primary | ICD-10-CM

## 2022-10-13 DIAGNOSIS — Z3A.35 35 WEEKS GESTATION OF PREGNANCY: ICD-10-CM

## 2022-10-13 LAB
GLUCOSE UR STRIP-MCNC: NEGATIVE MG/DL
PROT UR STRIP-MCNC: ABNORMAL MG/DL

## 2022-10-13 PROCEDURE — 99213 OFFICE O/P EST LOW 20 MIN: CPT | Performed by: OBSTETRICS & GYNECOLOGY

## 2022-10-13 NOTE — PROGRESS NOTES
October 13, 2022           We're always looking for ways to make our patients' experiences even better. Do you have recommendations on ways we may improve?  no    Overall were you satisfied with your first visit to our practice? yes

## 2022-10-13 NOTE — PROGRESS NOTES
Cc:  Pregnancy follow up.  No complaints.  Good FM.  No bleeding or spotting.  No major cramping, contractions.  Vitals reviewed.  Gen - alert and pleasant.  Abdomen - gravid, nontender, no guarding or rebound.  Ultrasound with normal BPP 8/8 with KEN normal.  GBS done.  A/P:  IUP at 35 weeks with chronic HTN.  - HTN.  Stable but borderline BP.  Consideration for delivery at 38 to 39 weeks.  - Syphilis.  Patient had consultation with ID and they do not feel she needs any further treatment.  Recommend repeating RPR in 3 months.  - Discussed maternal well being.

## 2022-10-13 NOTE — TELEPHONE ENCOUNTER
Patient is 35.5 week OB. Patient called and stated she just fell but did not hit abdomen directly. Patient c/o sharp pain x1. Patient stated she has had decreased fetal movement in last few days, but patient was just here a had a BPP today that was 8/8 per Dr. Esteves. Advised to get something to eat, drink and count fetal movements over the next couple of hours. If not adequate movement or if she starts having any more pain to go to L&D. Advice per Dr. Esteves. Patient gave verbal understanding.

## 2022-10-15 LAB — GP B STREP DNA SPEC QL NAA+PROBE: NEGATIVE

## 2022-10-21 ENCOUNTER — ROUTINE PRENATAL (OUTPATIENT)
Dept: OBSTETRICS AND GYNECOLOGY | Facility: CLINIC | Age: 22
End: 2022-10-21

## 2022-10-21 VITALS — BODY MASS INDEX: 31.78 KG/M2 | SYSTOLIC BLOOD PRESSURE: 135 MMHG | DIASTOLIC BLOOD PRESSURE: 93 MMHG | WEIGHT: 209 LBS

## 2022-10-21 DIAGNOSIS — Z3A.36 36 WEEKS GESTATION OF PREGNANCY: ICD-10-CM

## 2022-10-21 DIAGNOSIS — Z34.83 MULTIGRAVIDA IN THIRD TRIMESTER: Primary | ICD-10-CM

## 2022-10-21 DIAGNOSIS — O10.919 CHRONIC HYPERTENSION AFFECTING PREGNANCY: ICD-10-CM

## 2022-10-21 LAB
GLUCOSE UR STRIP-MCNC: NEGATIVE MG/DL
PROT UR STRIP-MCNC: ABNORMAL MG/DL

## 2022-10-21 PROCEDURE — 99213 OFFICE O/P EST LOW 20 MIN: CPT | Performed by: OBSTETRICS & GYNECOLOGY

## 2022-10-21 RX ORDER — ACETAMINOPHEN 500 MG
500 TABLET ORAL
COMMUNITY
Start: 2022-10-14 | End: 2022-11-04 | Stop reason: HOSPADM

## 2022-10-21 NOTE — PROGRESS NOTES
Cc:  Pregnancy follow up.  Patient c/o cramping.  Good FM.  No bleeding or spotting.  No headache or visual symptoms.  Vitals reviewed and BP noted.  Gen - alert and pleasant.  Abdomen - gravid, nontender, no guarding or rebound.  GBS negative.  Ultrasound with normal BPP.  A/P:  IUP at 36 weeks with chronic HTN  - cHTN.  Stable.  Reassuring testing.  Likely delivery 38 to 39 weeks.  Continue weekly testing.  - Syphilis.  No further treatment.  Recheck in 3 months.  - Discussed maternal well being.

## 2022-10-25 ENCOUNTER — ROUTINE PRENATAL (OUTPATIENT)
Dept: OBSTETRICS AND GYNECOLOGY | Facility: CLINIC | Age: 22
End: 2022-10-25

## 2022-10-25 VITALS — DIASTOLIC BLOOD PRESSURE: 88 MMHG | WEIGHT: 208 LBS | BODY MASS INDEX: 31.63 KG/M2 | SYSTOLIC BLOOD PRESSURE: 136 MMHG

## 2022-10-25 DIAGNOSIS — Z3A.36 36 WEEKS GESTATION OF PREGNANCY: ICD-10-CM

## 2022-10-25 DIAGNOSIS — Z34.83 MULTIGRAVIDA IN THIRD TRIMESTER: ICD-10-CM

## 2022-10-25 DIAGNOSIS — O10.919 CHRONIC HYPERTENSION AFFECTING PREGNANCY: Primary | ICD-10-CM

## 2022-10-25 LAB
GLUCOSE UR STRIP-MCNC: NEGATIVE MG/DL
PROT UR STRIP-MCNC: ABNORMAL MG/DL

## 2022-10-25 PROCEDURE — 99213 OFFICE O/P EST LOW 20 MIN: CPT | Performed by: OBSTETRICS & GYNECOLOGY

## 2022-10-25 NOTE — PROGRESS NOTES
Cc:  Pregnancy follow up.  Patient with complaints of cramping. Slight headache last night -she went to sleep and felt better.  Adequate fetal movement.  No bleeding or spotting.  Vitals reviewed.  BP looks good.  Gen - alert and pleasant.  Abdomen - gravid, nontender.  Ultrasound with normal BPP/KEN.  A/P:  IUP at 36 weeks with chronic HTN.  - cHTN.  Borderline but normal BP.  Not on medication.  Recommend induction between 38 and 39 weeks.  This was scheduled for next week.  Induction process reviewed.  - Discussed maternal well being.    I spent 20 minutes in total with patient.

## 2022-11-02 ENCOUNTER — ANESTHESIA EVENT (OUTPATIENT)
Dept: LABOR AND DELIVERY | Facility: HOSPITAL | Age: 22
End: 2022-11-02

## 2022-11-02 ENCOUNTER — ANESTHESIA (OUTPATIENT)
Dept: LABOR AND DELIVERY | Facility: HOSPITAL | Age: 22
End: 2022-11-02

## 2022-11-02 ENCOUNTER — HOSPITAL ENCOUNTER (OUTPATIENT)
Dept: LABOR AND DELIVERY | Facility: HOSPITAL | Age: 22
Discharge: HOME | End: 2022-11-02

## 2022-11-02 ENCOUNTER — HOSPITAL ENCOUNTER (INPATIENT)
Facility: HOSPITAL | Age: 22
LOS: 2 days | Discharge: HOME | End: 2022-11-04
Attending: OBSTETRICS & GYNECOLOGY | Admitting: OBSTETRICS & GYNECOLOGY

## 2022-11-02 PROBLEM — Z34.90 PREGNANCY: Status: ACTIVE | Noted: 2022-11-02

## 2022-11-02 PROBLEM — O16.9 HYPERTENSION IN PREGNANCY, DELIVERED: Status: ACTIVE | Noted: 2022-11-02

## 2022-11-02 LAB
ABO GROUP BLD: NORMAL
ALBUMIN SERPL-MCNC: 3.5 G/DL (ref 3.5–5.2)
ALBUMIN/GLOB SERPL: 1.1 G/DL
ALP SERPL-CCNC: 144 U/L (ref 39–117)
ALT SERPL W P-5'-P-CCNC: 7 U/L (ref 1–33)
AMPHET+METHAMPHET UR QL: POSITIVE
ANION GAP SERPL CALCULATED.3IONS-SCNC: 13.8 MMOL/L (ref 5–15)
AST SERPL-CCNC: 12 U/L (ref 1–32)
BARBITURATES UR QL SCN: NEGATIVE
BENZODIAZ UR QL SCN: NEGATIVE
BILIRUB SERPL-MCNC: <0.2 MG/DL (ref 0–1.2)
BLD GP AB SCN SERPL QL: NEGATIVE
BUN SERPL-MCNC: 7 MG/DL (ref 6–20)
BUN/CREAT SERPL: 14.9 (ref 7–25)
CALCIUM SPEC-SCNC: 8.9 MG/DL (ref 8.6–10.5)
CANNABINOIDS SERPL QL: NEGATIVE
CHLORIDE SERPL-SCNC: 102 MMOL/L (ref 98–107)
CO2 SERPL-SCNC: 23.2 MMOL/L (ref 22–29)
COCAINE UR QL: NEGATIVE
CREAT SERPL-MCNC: 0.47 MG/DL (ref 0.57–1)
CREAT UR-MCNC: 158 MG/DL
DEPRECATED RDW RBC AUTO: 40.6 FL (ref 37–54)
EGFRCR SERPLBLD CKD-EPI 2021: 136.5 ML/MIN/1.73
ERYTHROCYTE [DISTWIDTH] IN BLOOD BY AUTOMATED COUNT: 13.1 % (ref 12.3–15.4)
GLOBULIN UR ELPH-MCNC: 3.2 GM/DL
GLUCOSE SERPL-MCNC: 89 MG/DL (ref 65–99)
HBV SURFACE AG SERPL QL IA: NORMAL
HCT VFR BLD AUTO: 33.7 % (ref 34–46.6)
HCV AB SER DONR QL: NORMAL
HGB BLD-MCNC: 11 G/DL (ref 12–15.9)
HIV1+2 AB SER QL: NORMAL
MCH RBC QN AUTO: 27.6 PG (ref 26.6–33)
MCHC RBC AUTO-ENTMCNC: 32.6 G/DL (ref 31.5–35.7)
MCV RBC AUTO: 84.7 FL (ref 79–97)
METHADONE UR QL SCN: NEGATIVE
OPIATES UR QL: NEGATIVE
OXYCODONE UR QL SCN: NEGATIVE
PLATELET # BLD AUTO: 251 10*3/MM3 (ref 140–450)
PMV BLD AUTO: 10.9 FL (ref 6–12)
POTASSIUM SERPL-SCNC: 3.9 MMOL/L (ref 3.5–5.2)
PROT ?TM UR-MCNC: 19.4 MG/DL
PROT SERPL-MCNC: 6.7 G/DL (ref 6–8.5)
PROT/CREAT UR: 122.8 MG/G CREA (ref 0–200)
RBC # BLD AUTO: 3.98 10*6/MM3 (ref 3.77–5.28)
RH BLD: POSITIVE
RPR SER QL: REACTIVE
RPR SER-TITR: ABNORMAL {TITER}
SODIUM SERPL-SCNC: 139 MMOL/L (ref 136–145)
T&S EXPIRATION DATE: NORMAL
WBC NRBC COR # BLD: 14.4 10*3/MM3 (ref 3.4–10.8)

## 2022-11-02 PROCEDURE — 82570 ASSAY OF URINE CREATININE: CPT | Performed by: OBSTETRICS & GYNECOLOGY

## 2022-11-02 PROCEDURE — 80307 DRUG TEST PRSMV CHEM ANLYZR: CPT | Performed by: OBSTETRICS & GYNECOLOGY

## 2022-11-02 PROCEDURE — 88307 TISSUE EXAM BY PATHOLOGIST: CPT

## 2022-11-02 PROCEDURE — 3E033VJ INTRODUCTION OF OTHER HORMONE INTO PERIPHERAL VEIN, PERCUTANEOUS APPROACH: ICD-10-PCS | Performed by: OBSTETRICS & GYNECOLOGY

## 2022-11-02 PROCEDURE — G0480 DRUG TEST DEF 1-7 CLASSES: HCPCS | Performed by: OBSTETRICS & GYNECOLOGY

## 2022-11-02 PROCEDURE — 84156 ASSAY OF PROTEIN URINE: CPT | Performed by: OBSTETRICS & GYNECOLOGY

## 2022-11-02 PROCEDURE — 86850 RBC ANTIBODY SCREEN: CPT | Performed by: OBSTETRICS & GYNECOLOGY

## 2022-11-02 PROCEDURE — G0432 EIA HIV-1/HIV-2 SCREEN: HCPCS | Performed by: OBSTETRICS & GYNECOLOGY

## 2022-11-02 PROCEDURE — 86803 HEPATITIS C AB TEST: CPT | Performed by: OBSTETRICS & GYNECOLOGY

## 2022-11-02 PROCEDURE — 86900 BLOOD TYPING SEROLOGIC ABO: CPT | Performed by: OBSTETRICS & GYNECOLOGY

## 2022-11-02 PROCEDURE — 59410 OBSTETRICAL CARE: CPT | Performed by: OBSTETRICS & GYNECOLOGY

## 2022-11-02 PROCEDURE — 86762 RUBELLA ANTIBODY: CPT | Performed by: OBSTETRICS & GYNECOLOGY

## 2022-11-02 PROCEDURE — 25010000002 ROPIVACAINE PER 1 MG: Performed by: ANESTHESIOLOGY

## 2022-11-02 PROCEDURE — 86901 BLOOD TYPING SEROLOGIC RH(D): CPT | Performed by: OBSTETRICS & GYNECOLOGY

## 2022-11-02 PROCEDURE — 87340 HEPATITIS B SURFACE AG IA: CPT | Performed by: OBSTETRICS & GYNECOLOGY

## 2022-11-02 PROCEDURE — C1755 CATHETER, INTRASPINAL: HCPCS | Performed by: ANESTHESIOLOGY

## 2022-11-02 PROCEDURE — 3E0E3GC INTRODUCTION OF OTHER THERAPEUTIC SUBSTANCE INTO PRODUCTS OF CONCEPTION, PERCUTANEOUS APPROACH: ICD-10-PCS | Performed by: OBSTETRICS & GYNECOLOGY

## 2022-11-02 PROCEDURE — 86780 TREPONEMA PALLIDUM: CPT | Performed by: OBSTETRICS & GYNECOLOGY

## 2022-11-02 PROCEDURE — 86593 SYPHILIS TEST NON-TREP QUANT: CPT | Performed by: OBSTETRICS & GYNECOLOGY

## 2022-11-02 PROCEDURE — 80053 COMPREHEN METABOLIC PANEL: CPT | Performed by: OBSTETRICS & GYNECOLOGY

## 2022-11-02 PROCEDURE — 10907ZC DRAINAGE OF AMNIOTIC FLUID, THERAPEUTIC FROM PRODUCTS OF CONCEPTION, VIA NATURAL OR ARTIFICIAL OPENING: ICD-10-PCS | Performed by: OBSTETRICS & GYNECOLOGY

## 2022-11-02 PROCEDURE — 86592 SYPHILIS TEST NON-TREP QUAL: CPT | Performed by: OBSTETRICS & GYNECOLOGY

## 2022-11-02 PROCEDURE — 85027 COMPLETE CBC AUTOMATED: CPT | Performed by: OBSTETRICS & GYNECOLOGY

## 2022-11-02 RX ORDER — SODIUM CHLORIDE 0.9 % (FLUSH) 0.9 %
10 SYRINGE (ML) INJECTION AS NEEDED
Status: DISCONTINUED | OUTPATIENT
Start: 2022-11-02 | End: 2022-11-02 | Stop reason: HOSPADM

## 2022-11-02 RX ORDER — SODIUM CHLORIDE, SODIUM LACTATE, POTASSIUM CHLORIDE, CALCIUM CHLORIDE 600; 310; 30; 20 MG/100ML; MG/100ML; MG/100ML; MG/100ML
125 INJECTION, SOLUTION INTRAVENOUS CONTINUOUS
Status: DISCONTINUED | OUTPATIENT
Start: 2022-11-02 | End: 2022-11-02

## 2022-11-02 RX ORDER — FAMOTIDINE 10 MG/ML
20 INJECTION, SOLUTION INTRAVENOUS 2 TIMES DAILY
Status: DISCONTINUED | OUTPATIENT
Start: 2022-11-02 | End: 2022-11-02

## 2022-11-02 RX ORDER — HYDROCORTISONE 25 MG/G
1 CREAM TOPICAL AS NEEDED
Status: DISCONTINUED | OUTPATIENT
Start: 2022-11-02 | End: 2022-11-04 | Stop reason: HOSPADM

## 2022-11-02 RX ORDER — ROPIVACAINE HYDROCHLORIDE 2 MG/ML
INJECTION, SOLUTION EPIDURAL; INFILTRATION; PERINEURAL AS NEEDED
Status: DISCONTINUED | OUTPATIENT
Start: 2022-11-02 | End: 2022-11-02 | Stop reason: SURG

## 2022-11-02 RX ORDER — ONDANSETRON 2 MG/ML
4 INJECTION INTRAMUSCULAR; INTRAVENOUS ONCE AS NEEDED
Status: DISCONTINUED | OUTPATIENT
Start: 2022-11-02 | End: 2022-11-02 | Stop reason: HOSPADM

## 2022-11-02 RX ORDER — METHYLERGONOVINE MALEATE 0.2 MG/ML
200 INJECTION INTRAVENOUS ONCE AS NEEDED
Status: CANCELLED | OUTPATIENT
Start: 2022-11-02

## 2022-11-02 RX ORDER — TRISODIUM CITRATE DIHYDRATE AND CITRIC ACID MONOHYDRATE 500; 334 MG/5ML; MG/5ML
30 SOLUTION ORAL ONCE
Status: DISCONTINUED | OUTPATIENT
Start: 2022-11-02 | End: 2022-11-02 | Stop reason: HOSPADM

## 2022-11-02 RX ORDER — MISOPROSTOL 100 MCG
25 TABLET ORAL
Status: COMPLETED | OUTPATIENT
Start: 2022-11-02 | End: 2022-11-02

## 2022-11-02 RX ORDER — MAGNESIUM CARB/ALUMINUM HYDROX 105-160MG
30 TABLET,CHEWABLE ORAL ONCE
Status: DISCONTINUED | OUTPATIENT
Start: 2022-11-02 | End: 2022-11-02 | Stop reason: HOSPADM

## 2022-11-02 RX ORDER — EPHEDRINE SULFATE 50 MG/ML
5 INJECTION, SOLUTION INTRAVENOUS
Status: COMPLETED | OUTPATIENT
Start: 2022-11-02 | End: 2022-11-02

## 2022-11-02 RX ORDER — DOCUSATE SODIUM 100 MG/1
100 CAPSULE, LIQUID FILLED ORAL 2 TIMES DAILY
Status: DISCONTINUED | OUTPATIENT
Start: 2022-11-02 | End: 2022-11-04 | Stop reason: HOSPADM

## 2022-11-02 RX ORDER — PRENATAL VIT/IRON FUM/FOLIC AC 27MG-0.8MG
1 TABLET ORAL DAILY
Status: DISCONTINUED | OUTPATIENT
Start: 2022-11-02 | End: 2022-11-04 | Stop reason: HOSPADM

## 2022-11-02 RX ORDER — CARBOPROST TROMETHAMINE 250 UG/ML
250 INJECTION, SOLUTION INTRAMUSCULAR
Status: DISCONTINUED | OUTPATIENT
Start: 2022-11-02 | End: 2022-11-02 | Stop reason: HOSPADM

## 2022-11-02 RX ORDER — MISOPROSTOL 200 UG/1
800 TABLET ORAL ONCE AS NEEDED
Status: DISCONTINUED | OUTPATIENT
Start: 2022-11-02 | End: 2022-11-02 | Stop reason: HOSPADM

## 2022-11-02 RX ORDER — IBUPROFEN 600 MG/1
600 TABLET ORAL EVERY 8 HOURS PRN
Status: DISCONTINUED | OUTPATIENT
Start: 2022-11-02 | End: 2022-11-04 | Stop reason: HOSPADM

## 2022-11-02 RX ORDER — SODIUM CHLORIDE 0.9 % (FLUSH) 0.9 %
1-10 SYRINGE (ML) INJECTION AS NEEDED
Status: DISCONTINUED | OUTPATIENT
Start: 2022-11-02 | End: 2022-11-04 | Stop reason: HOSPADM

## 2022-11-02 RX ORDER — ERYTHROMYCIN 5 MG/G
OINTMENT OPHTHALMIC
Status: DISPENSED
Start: 2022-11-02 | End: 2022-11-03

## 2022-11-02 RX ORDER — OXYTOCIN/0.9 % SODIUM CHLORIDE 30/500 ML
999 PLASTIC BAG, INJECTION (ML) INTRAVENOUS ONCE
Status: DISCONTINUED | OUTPATIENT
Start: 2022-11-02 | End: 2022-11-02 | Stop reason: HOSPADM

## 2022-11-02 RX ORDER — CALCIUM CARBONATE 200(500)MG
2 TABLET,CHEWABLE ORAL 3 TIMES DAILY PRN
Status: DISCONTINUED | OUTPATIENT
Start: 2022-11-02 | End: 2022-11-04 | Stop reason: HOSPADM

## 2022-11-02 RX ORDER — BISACODYL 10 MG
10 SUPPOSITORY, RECTAL RECTAL DAILY PRN
Status: DISCONTINUED | OUTPATIENT
Start: 2022-11-03 | End: 2022-11-04 | Stop reason: HOSPADM

## 2022-11-02 RX ORDER — PHYTONADIONE 1 MG/.5ML
INJECTION, EMULSION INTRAMUSCULAR; INTRAVENOUS; SUBCUTANEOUS
Status: DISPENSED
Start: 2022-11-02 | End: 2022-11-03

## 2022-11-02 RX ORDER — OXYTOCIN/0.9 % SODIUM CHLORIDE 30/500 ML
250 PLASTIC BAG, INJECTION (ML) INTRAVENOUS CONTINUOUS
Status: ACTIVE | OUTPATIENT
Start: 2022-11-02 | End: 2022-11-02

## 2022-11-02 RX ORDER — HYDROCODONE BITARTRATE AND ACETAMINOPHEN 5; 325 MG/1; MG/1
1 TABLET ORAL EVERY 4 HOURS PRN
Status: DISCONTINUED | OUTPATIENT
Start: 2022-11-02 | End: 2022-11-04 | Stop reason: HOSPADM

## 2022-11-02 RX ORDER — OXYTOCIN/0.9 % SODIUM CHLORIDE 30/500 ML
2-20 PLASTIC BAG, INJECTION (ML) INTRAVENOUS
Status: DISCONTINUED | OUTPATIENT
Start: 2022-11-02 | End: 2022-11-02

## 2022-11-02 RX ORDER — TEMAZEPAM 7.5 MG/1
7.5 CAPSULE ORAL NIGHTLY PRN
Status: DISCONTINUED | OUTPATIENT
Start: 2022-11-02 | End: 2022-11-04 | Stop reason: HOSPADM

## 2022-11-02 RX ORDER — FENTANYL CIT 0.2 MG/100ML-ROPIV 0.2%-NACL 0.9% EPIDURAL INJ 2/0.2 MCG/ML-%
8 SOLUTION INJECTION CONTINUOUS
Status: DISCONTINUED | OUTPATIENT
Start: 2022-11-02 | End: 2022-11-02

## 2022-11-02 RX ORDER — FAMOTIDINE 10 MG/ML
20 INJECTION, SOLUTION INTRAVENOUS ONCE AS NEEDED
Status: DISCONTINUED | OUTPATIENT
Start: 2022-11-02 | End: 2022-11-02 | Stop reason: HOSPADM

## 2022-11-02 RX ORDER — LIDOCAINE HYDROCHLORIDE AND EPINEPHRINE 15; 5 MG/ML; UG/ML
INJECTION, SOLUTION EPIDURAL AS NEEDED
Status: DISCONTINUED | OUTPATIENT
Start: 2022-11-02 | End: 2022-11-02 | Stop reason: SURG

## 2022-11-02 RX ADMIN — HYDROCODONE BITARTRATE AND ACETAMINOPHEN 1 TABLET: 5; 325 TABLET ORAL at 23:25

## 2022-11-02 RX ADMIN — SODIUM CHLORIDE, POTASSIUM CHLORIDE, SODIUM LACTATE AND CALCIUM CHLORIDE 125 ML/HR: 600; 310; 30; 20 INJECTION, SOLUTION INTRAVENOUS at 04:00

## 2022-11-02 RX ADMIN — SODIUM CHLORIDE, POTASSIUM CHLORIDE, SODIUM LACTATE AND CALCIUM CHLORIDE 125 ML/HR: 600; 310; 30; 20 INJECTION, SOLUTION INTRAVENOUS at 12:47

## 2022-11-02 RX ADMIN — FAMOTIDINE 20 MG: 10 INJECTION INTRAVENOUS at 02:39

## 2022-11-02 RX ADMIN — MISOPROSTOL 25 MCG: 100 TABLET ORAL at 06:47

## 2022-11-02 RX ADMIN — ROPIVACAINE HYDROCHLORIDE 5 MG: 2 INJECTION, SOLUTION EPIDURAL; INFILTRATION at 10:39

## 2022-11-02 RX ADMIN — Medication 2 MILLI-UNITS/MIN: at 11:41

## 2022-11-02 RX ADMIN — ROPIVACAINE HYDROCHLORIDE 5 MG: 2 INJECTION, SOLUTION EPIDURAL; INFILTRATION at 10:44

## 2022-11-02 RX ADMIN — Medication 250 ML/HR: at 14:36

## 2022-11-02 RX ADMIN — MISOPROSTOL 25 MCG: 100 TABLET ORAL at 02:27

## 2022-11-02 RX ADMIN — LIDOCAINE HYDROCHLORIDE AND EPINEPHRINE 2.5 ML: 15; 5 INJECTION, SOLUTION EPIDURAL at 10:36

## 2022-11-02 RX ADMIN — Medication 10 ML/HR: at 10:44

## 2022-11-02 RX ADMIN — SODIUM CHLORIDE, POTASSIUM CHLORIDE, SODIUM LACTATE AND CALCIUM CHLORIDE 125 ML/HR: 600; 310; 30; 20 INJECTION, SOLUTION INTRAVENOUS at 01:05

## 2022-11-02 RX ADMIN — SODIUM CHLORIDE, POTASSIUM CHLORIDE, SODIUM LACTATE AND CALCIUM CHLORIDE 999 ML/HR: 600; 310; 30; 20 INJECTION, SOLUTION INTRAVENOUS at 09:46

## 2022-11-02 RX ADMIN — SODIUM CHLORIDE 500 ML: 9 INJECTION, SOLUTION INTRAVENOUS at 12:35

## 2022-11-02 RX ADMIN — DOCUSATE SODIUM 100 MG: 100 CAPSULE, LIQUID FILLED ORAL at 20:17

## 2022-11-02 RX ADMIN — EPHEDRINE SULFATE 5 MG: 50 INJECTION INTRAVENOUS at 11:04

## 2022-11-02 RX ADMIN — EPHEDRINE SULFATE 5 MG: 50 INJECTION INTRAVENOUS at 12:34

## 2022-11-02 NOTE — ANESTHESIA POSTPROCEDURE EVALUATION
Patient: Debbie Bauman    Procedure Summary     Date: 11/02/22 Room / Location:     Anesthesia Start: 1027 Anesthesia Stop: 1412    Procedure: LABOR ANALGESIA Diagnosis:     Scheduled Providers:  Provider: Sukhdeep Canales MD    Anesthesia Type: epidural ASA Status: 3          Anesthesia Type: epidural    Vitals  Vitals Value Taken Time   /68 11/02/22 1450   Temp     Pulse 82 11/02/22 1450   Resp 16 11/02/22 1446   SpO2 100 % 11/02/22 1329   Vitals shown include unvalidated device data.        Post Anesthesia Care and Evaluation    Patient location during evaluation: bedside  Patient participation: complete - patient participated  Level of consciousness: awake and alert  Pain score: 2  Pain management: adequate    Airway patency: patent  Anesthetic complications: No anesthetic complications  PONV Status: controlled  Cardiovascular status: acceptable  Respiratory status: acceptable  Hydration status: acceptable  Post Neuraxial Block status: No signs or symptoms of PDPH  Comments: I was immediately available throughout labor and immediate post partum period  No anesthesia care post op

## 2022-11-02 NOTE — H&P
Patient is a 22 y.o. female admitted at 38+ weeks for induction of labor secondary to chronic hypertension.  Hypertension is generally well controlled with some borderline blood pressures noted.  Of note, patient with syphilis preconception - per ID, she likely had secondary syphilis in summer of  and received treatment.  Because of increasing titers in early , she was retreated as late latent syphilis with 3 doses of weekly PCN and then began retreatment recently for possible failure of treatment.    Patient Active Problem List   Diagnosis   • Family history of diabetes mellitus   • Menorrhagia with irregular cycle   • Major depressive disorder   • False positive serology for HIV   • Cyst of left ovary   • Polysubstance abuse (HCC)   • Seasonal allergic rhinitis due to pollen   • Dyspepsia   • Late latent syphilis   • Pregnancy     Prenatal care is complicated by preconception syphilis and chronic HTN versus elevated BP due to substance abuse.    Past Medical History:   Diagnosis Date   • ADHD (attention deficit hyperactivity disorder)    • Chlamydia        • Chronic hypertension in pregnancy 2018   • COVID-19 2022   • Depression    • Dizziness 2018   • Essential hypertension 2018   • Heart palpitations 2018   • Hypertension    • Inattention 2017   • Late latent syphilis 2022   • Major depression, recurrent (HCC) 2020   • Menstrual cycle problem 2018   • Pain in radius 2017   • Varicella        Past Surgical History:   Procedure Laterality Date   • TONSILLECTOMY     • WISDOM TOOTH EXTRACTION         Allergies   Allergen Reactions   • Bee Venom Swelling       Social History     Socioeconomic History   • Marital status: Legally    • Number of children: 0   Tobacco Use   • Smoking status: Former     Packs/day: 0.10     Types: Cigarettes     Quit date: 3/28/2018     Years since quittin.6   • Smokeless tobacco: Never   Vaping Use   •  Vaping Use: Former   • Substances: THC   Substance and Sexual Activity   • Alcohol use: Not Currently     Comment: occasionally   • Drug use: Yes     Frequency: 2.0 times per week     Types: Marijuana     Comment: occasionally, did have some this week 8/5   • Sexual activity: Yes     Partners: Male       Physical Exam  Gen: Alert and pleasant.  Vitals:    11/02/22 0900   BP: 134/65   Pulse: 71   Resp:    Temp:    SpO2:      HEENT: WNL  Abdomen: Gravid.  EFW 6lbs  Pelvic:  Cervix  70/4-5/-3.  AROM with clear fluid.  IUPC placed.  Pelvis clinically adequate.  FHT: Baseline 150 bmp with good LTV and accelerations.  Intermittent decelerations noted, likely variable decelerations.    Assessment/Plan: IUP at 38 weeks with chronic HTN  - HTN.  Patient had positive toxicology screen for amphetamines.  Amphetamines increase blood pressure.  However, previous cHTN suspected.  Currently, blood pressures are normal and no evidence of pre-eclampsia.  -  Syphilis.  No evidence of congenital syphilis on sonogram.  ID consult obtained within past month states patient has been adequately treated.  (Dr Vela.)    Anthony Esteves MD

## 2022-11-02 NOTE — ANESTHESIA PROCEDURE NOTES
Labor Epidural      Patient reassessed immediately prior to procedure    Patient location during procedure: OB  Indication:at surgeon's request  Performed By  Anesthesiologist: Sukhdeep Canales MD  Preanesthetic Checklist  Completed: patient identified, IV checked, site marked, risks and benefits discussed, surgical consent, monitors and equipment checked, pre-op evaluation and timeout performed  Prep:  Pt Position:sitting  Sterile Tech:cap, gloves, mask and sterile barrier  Prep:chlorhexidine gluconate and isopropyl alcohol  Monitoring:blood pressure monitoring and EKG  Epidural Block Procedure:  Approach:midline  Guidance:landmark technique  Location:L2-L3  Needle Type:Tuohy  Needle Gauge:17  Loss of Resistance Medium: air  Cath Depth at skin:12 cm  Paresthesia: none  Aspiration:negative  Test Dose:negative  Number of Attempts: 1  Post Assessment:  Dressing:occlusive dressing applied, secured with tape and biopatch applied  Pt Tolerance:patient tolerated the procedure well with no apparent complications  Complications:no

## 2022-11-02 NOTE — L&D DELIVERY NOTE
DELIVERY REPORT    [facility]  Patient: Debbie Bauman  : 2000  Age: 22 y.o.  Unit Number: [id number]    DATE OF DELIVERY: 2022    PREOPERATIVE DIAGNOSIS:  Intrauterine Pregnancy at 38 weeks.  Chronic Hypertension.    POSTOPERATIVE DIAGNOSIS:  same as pre-op diagnosis    PROCEDURE PERFORMED:   Spontaneous Vaginal Delivery    SURGEON: Anthony Esteves MD    ASSISTANT:   None    ANESTHESIA:   Epidural    QUANTITATIVE BLOOD LOSS:   191 ml    FINDINGS:     Live Viable Female Infant // Weight  7 lbs 9 oz // Apgar 1 minute  9 and Apgar 5 minutes  9             Normal placenta and cord                        No vaginal or perineal laceration    DESCRIPTION OF PROCEDURE: Patient is a 22 year old female  at 38 weeks with history of chronic hypertension, controlled without medication during pregnancy.  Patient with some borderline blood pressure but negative work up.  Significantly, she had syphilis in the seun-conception phase and was treated with intramuscular penicillin, treated as late latent course.  Patient underwent misoprostol for cervical ripening.  She responded well with more favorable cervix noted after 2 doses.  She underwent amniotomy and intrauterine pressure catheter placement.  Subsequently, she received an epidural.  Patient progressed along a normal labor curve.  She received an amnioinfusion for resolution of variable decelerations.  She reached the second stage and delivery team was assembled.  Pushing effort was sufficient.  She pushed for 30 to 40 minutes.  Perineum was prepped with betadine.  She delivered a live viable female infant in the occiput posterior position.  Nose and mouth were bulb suctioned.  With continued maternal effort, the right anterior shoulder, torso and body delivered without difficulty.  Infant was vigorous.  Delayed cord clamping occurred, the cord was cut and infant was presented to parents.  Cord blood was obtained and placenta was  delivered spontaneously intact with 3 vessel cord noted.  No lacerations noted.  Pitocin was given for prevention of uterine atony.    COMPLICATIONS: None  SPECIMEN:  Placenta and  Cord blood  DISPOSITION:  Mother and baby remained in LDR in stable condition       Anthony Esteves MD  Completed: 11/2/2022

## 2022-11-02 NOTE — ANESTHESIA PREPROCEDURE EVALUATION
Anesthesia Evaluation     Patient summary reviewed and Nursing notes reviewed   no history of anesthetic complications:  NPO Solid Status: > 6 hours  NPO Liquid Status: > 6 hours           Airway   Mallampati: II  TM distance: >3 FB  Neck ROM: full  no difficulty expected and No difficulty expected  Dental - normal exam     Pulmonary - negative pulmonary ROS and normal exam    breath sounds clear to auscultation  (-) rhonchi, decreased breath sounds, wheezes, rales, stridor  Cardiovascular - negative cardio ROS and normal exam    NYHA Classification: I  Rhythm: regular  Rate: normal    (-) murmur, weak pulses, friction rub, systolic click, carotid bruits, JVD, peripheral edema      Neuro/Psych  (+) psychiatric history ADHD,    GI/Hepatic/Renal/Endo    (+)  GERD,      Musculoskeletal (-) negative ROS    Abdominal  - normal exam    Abdomen: soft.   Substance History - negative use     OB/GYN    (+) Pregnant,         Other - negative ROS                       Anesthesia Plan    ASA 3     epidural     intravenous induction     Anesthetic plan, risks, benefits, and alternatives have been provided, discussed and informed consent has been obtained with: patient.        CODE STATUS:

## 2022-11-03 LAB
BASOPHILS # BLD AUTO: 0.04 10*3/MM3 (ref 0–0.2)
BASOPHILS NFR BLD AUTO: 0.3 % (ref 0–1.5)
BUPRENORPHINE UR QL: NEGATIVE NG/ML
DEPRECATED RDW RBC AUTO: 39.1 FL (ref 37–54)
EOSINOPHIL # BLD AUTO: 0.14 10*3/MM3 (ref 0–0.4)
EOSINOPHIL NFR BLD AUTO: 1 % (ref 0.3–6.2)
ERYTHROCYTE [DISTWIDTH] IN BLOOD BY AUTOMATED COUNT: 13 % (ref 12.3–15.4)
HCT VFR BLD AUTO: 32.1 % (ref 34–46.6)
HGB BLD-MCNC: 10.7 G/DL (ref 12–15.9)
IMM GRANULOCYTES # BLD AUTO: 0.07 10*3/MM3 (ref 0–0.05)
IMM GRANULOCYTES NFR BLD AUTO: 0.5 % (ref 0–0.5)
LYMPHOCYTES # BLD AUTO: 2.33 10*3/MM3 (ref 0.7–3.1)
LYMPHOCYTES NFR BLD AUTO: 16.9 % (ref 19.6–45.3)
MCH RBC QN AUTO: 27.6 PG (ref 26.6–33)
MCHC RBC AUTO-ENTMCNC: 33.3 G/DL (ref 31.5–35.7)
MCV RBC AUTO: 82.7 FL (ref 79–97)
MONOCYTES # BLD AUTO: 1.18 10*3/MM3 (ref 0.1–0.9)
MONOCYTES NFR BLD AUTO: 8.6 % (ref 5–12)
NEUTROPHILS NFR BLD AUTO: 10.03 10*3/MM3 (ref 1.7–7)
NEUTROPHILS NFR BLD AUTO: 72.7 % (ref 42.7–76)
NRBC BLD AUTO-RTO: 0 /100 WBC (ref 0–0.2)
PLATELET # BLD AUTO: 212 10*3/MM3 (ref 140–450)
PMV BLD AUTO: 10.8 FL (ref 6–12)
RBC # BLD AUTO: 3.88 10*6/MM3 (ref 3.77–5.28)
RUBV IGG SERPL IA-ACNC: 7.95 INDEX
TREPONEMA PALLIDUM IGG+IGM AB [PRESENCE] IN SERUM OR PLASMA BY IMMUNOASSAY: REACTIVE
WBC NRBC COR # BLD: 13.79 10*3/MM3 (ref 3.4–10.8)

## 2022-11-03 PROCEDURE — 0503F POSTPARTUM CARE VISIT: CPT | Performed by: OBSTETRICS & GYNECOLOGY

## 2022-11-03 PROCEDURE — 85025 COMPLETE CBC W/AUTO DIFF WBC: CPT | Performed by: OBSTETRICS & GYNECOLOGY

## 2022-11-03 RX ADMIN — IBUPROFEN 600 MG: 600 TABLET, FILM COATED ORAL at 13:51

## 2022-11-03 RX ADMIN — HYDROCODONE BITARTRATE AND ACETAMINOPHEN 1 TABLET: 5; 325 TABLET ORAL at 13:51

## 2022-11-03 RX ADMIN — Medication: at 08:35

## 2022-11-03 RX ADMIN — HYDROCODONE BITARTRATE AND ACETAMINOPHEN 1 TABLET: 5; 325 TABLET ORAL at 08:35

## 2022-11-03 RX ADMIN — DOCUSATE SODIUM 100 MG: 100 CAPSULE, LIQUID FILLED ORAL at 08:35

## 2022-11-03 RX ADMIN — IBUPROFEN 600 MG: 600 TABLET, FILM COATED ORAL at 02:34

## 2022-11-03 RX ADMIN — DOCUSATE SODIUM 100 MG: 100 CAPSULE, LIQUID FILLED ORAL at 20:08

## 2022-11-03 RX ADMIN — Medication 1 TABLET: at 08:40

## 2022-11-03 NOTE — PROGRESS NOTES
Discharge Planning Assessment  [facility]     Patient Name: Debbie Bauman  MRN: [id number]  Today's Date: 11/3/2022    Admit Date: 11/2/2022    Plan: Await follow up from CPS. CSW will follow cord toxicology. JOHN Delatorre   Discharge Needs Assessment    No documentation.                Discharge Plan     Row Name 11/03/22 1159       Plan    Plan Await follow up from CPS. CSW will follow cord toxicology. JOHN Delatorre    Plan Comments …continued… Infant will follow up with [facility], mother is comfortable scheduling appointments for infant, and has transportation to appointments. Mother reports she is not current with [facility], but is familiar with the program as she had it with her son. Mother intends to apply for infant following discharge. CSW offered mother’s a HANDS referral, which she politely declined at this time. CSW discussed mother’s UDS and cord toxicology. Mother reports she took “one hit of weed” about 1 week ago, but denied all other substance use prenatally. Mother voiced it could have been laced, without her knowledge. Mother also shared she used cocaine prior to pregnancy and has not used it since. Mother voiced understanding of mandated reporting to CPS, and asked appropriate questions regarding the process. CSW spent time building rapport with mother throughout assessment, and offered validation, support, and encouragement. CSW provided a packet of resources in including: WIC, HANDS, transportation, infant supplies, counseling, online support groups, postpartum mood and anxiety resources, NICU parent resources, and general community resources. Mother was polite and appropriate throughout assessment, and denied having unmet needs at this time. CSW submitted a CPS report via web referral (web ID# [id number]); CSW will follow up with CPS intake to determine if the report is accepted for investigation. CSW will follow cord toxicology and complete mandated reporting to CPS if warranted. CSW will  "remain available for psychosocial support while infant is in the NICU. Jaylene KRAUS, CSW    Row Name 22 [id number]       Plan    Plan Comments Mother: Debbie Bauman, MRN [id number]; Infant: Jules Bauman, MRN [id number]. CSW was consulted for “NICU admission, maternal UDS positive for amphetamines on admission”, “Potential  Drug Exposure”,and \"Scored a 12 on PPD, Positive for amphet\". Of note, mother’s UDS on admit was positive for amphetamines/methamphetamines on , as well as positive for THC prenatally on 3/15. Infant’s UDS was negative, and cord toxicology has been sent. Infant is currently in the NICU due to exposure to maternal syphilis in utero. CSW met with mother at bedside; mother gave consent for father of infant to be present for assessment, however father appeared to be asleep/resting on the couch and did not interact with CSW. Mother verified address, phone number, and insurance. Mother confirmed she spoke with someone from [employer] to have infant added to insurance today. Mother reports having a car seat, crib and bassinet, clothes, diapers, and some feeding supplies for infant. This is mother’s 2nd child; she also has a 4 year old son from a previous relationship. Mother shared her son primarily lives with his father at this time. Mother shared it was a mutual decision made between them due to her living situation. Mother denied having prior CPS involvement. Mother shared she and father of infant/significant other currently live with maternal grandparents of infant (mother’s step-mother), and that maternal grandmother of infant lives down the street. Mother shared her primary supports are father of infant, and maternal grandparents of infant. Mother also shared maternal grandmother of infant recently immigrated to [place] from [place], where mother and her family are from. Mother stated she moved to [place] about 13 years ago with her father. Paternal grandparents " live in [place] at this time. …continued…              Continued Care and Services - Admitted Since 11/2/2022    Coordination has not been started for this encounter.          Demographic Summary     Row Name 11/03/22 1156       General Information    Admission Type inpatient    Arrived From home    Referral Source nursing    Reason for Consult psychosocial concerns;substance use concerns    General Information Comments NICU admit, mom UDS+               Functional Status    No documentation.                Psychosocial     Row Name 11/03/22 1157       Behavior WDL    Behavior WDL WDL       Emotion Mood WDL    Emotion/Mood/Affect WDL WDL       Speech WDL    Speech WDL WDL       Perceptual State WDL    Perceptual State WDL WDL       Thought Process WDL    Thought Process WDL WDL       Intellectual Performance WDL    Intellectual Performance WDL WDL       Coping/Stress    Major Change/Loss/Stressor birth;medical condition/diagnosis    Patient Personal Strengths flexibility;future/goal oriented;positive attitude;strong support system    Sources of Support other family members;parent(s);significant other               Abuse/Neglect     Row Name 11/03/22 1157       Personal Safety    Physical Signs of Abuse Present no               Legal    No documentation.                Substance Abuse     Row Name 11/03/22 1157       Substance Use    Substance Use Comment mom's UDS+ for amphetamines/methamphetamines; mom denies all substance use except for THC ~1 week ago-said THC could have been laced. Reported cocaine use prior to pregnancy               Patient Forms    No documentation.                   JOSE Pedroza

## 2022-11-03 NOTE — PLAN OF CARE
Problem: Adult Inpatient Plan of Care  Goal: Plan of Care Review  Outcome: Ongoing, Progressing  Flowsheets (Taken 11/3/2022 0623)  Progress: improving  Plan of Care Reviewed With: patient  Outcome Evaluation: VSS, fundus and lochia WDL, up ad jamel, voiding without difficulty, pain control with PO meds, visits infant in the NICU, pumping  Goal: Patient-Specific Goal (Individualized)  Outcome: Ongoing, Progressing  Goal: Absence of Hospital-Acquired Illness or Injury  Outcome: Ongoing, Progressing  Intervention: Identify and Manage Fall Risk  Description: Perform standard risk assessment on admission using a validated tool or comprehensive approach appropriate to the patient; reassess fall risk frequently, with change in status or transfer to another level of care.  Communicate fall injury risk to interprofessional healthcare team.  Determine need for increased observation, equipment and environmental modification, such as low bed, signage and supportive, nonskid footwear.  Adjust safety measures to individual developmental age, stage and identified risk factors.  Reinforce the importance of safety and physical activity with patient and family.  Perform regular intentional rounding to assess need for position change, pain assessment and personal needs, including assistance with toileting.  Recent Flowsheet Documentation  Taken 11/3/2022 0400 by Vidya Tenorio RN  Safety Promotion/Fall Prevention: safety round/check completed  Taken 11/3/2022 0330 by Vidya Tenoroi RN  Safety Promotion/Fall Prevention: safety round/check completed  Taken 11/3/2022 0234 by Vidya Tenorio RN  Safety Promotion/Fall Prevention: safety round/check completed  Taken 11/3/2022 0020 by Vidya Tenorio RN  Safety Promotion/Fall Prevention: (in NICU) patient off unit  Taken 11/2/2022 2325 by Vidya Tenorio RN  Safety Promotion/Fall Prevention: safety round/check completed  Taken 11/2/2022 2220 by Vidya Tenorio RN  Safety  Promotion/Fall Prevention: (in NICU) patient off unit  Taken 11/2/2022 2017 by Vidya Tenorio RN  Safety Promotion/Fall Prevention: safety round/check completed  Intervention: Prevent Skin Injury  Description: Perform a screening for skin injury risk, such as pressure or moisture associated skin damage on admission and at regular intervals throughout hospital stay.  Keep all areas of skin (especially folds) clean and dry.  Maintain adequate skin hydration.  Relieve and redistribute pressure and protect bony prominences; implement measures based on patient-specific risk factors.  Match turning and repositioning schedule to clinical condition.  Encourage weight shift frequently; assist with reposition if unable to complete independently.  Float heels off bed; avoid pressure on the Achilles tendon.  Keep skin free from extended contact with medical devices.  Encourage functional activity and mobility, as early as tolerated.  Use aids (e.g., slide boards, mechanical lift) during transfer.  Recent Flowsheet Documentation  Taken 11/2/2022 2017 by Vidya Tenorio RN  Body Position: position changed independently  Intervention: Prevent and Manage VTE (Venous Thromboembolism) Risk  Description: Assess for VTE (venous thromboembolism) risk.  Encourage and assist with early ambulation.  Initiate and maintain compression or other therapy, as indicated, based on identified risk in accordance with organizational protocol and provider order.  Encourage both active and passive leg exercises while in bed, if unable to ambulate.  Recent Flowsheet Documentation  Taken 11/2/2022 2017 by Vidya Tenorio RN  Activity Management:   activity adjusted per tolerance   ambulated to bathroom  Goal: Optimal Comfort and Wellbeing  Outcome: Ongoing, Progressing  Intervention: Monitor Pain and Promote Comfort  Description: Assess pain level, treatment efficacy and patient response at regular intervals using a consistent pain scale.  Consider  the presence and impact of preexisting chronic pain.  Encourage patient and caregiver involvement in pain assessment, interventions and safety measures.  Recent Flowsheet Documentation  Taken 11/3/2022 0234 by Vidya Tenorio RN  Pain Management Interventions: see MAR  Taken 11/2/2022 2325 by Vidya Tenorio RN  Pain Management Interventions: see MAR  Intervention: Provide Person-Centered Care  Description: Use a family-focused approach to care.  Develop trust and rapport by proactively providing information, encouraging questions, addressing concerns and offering reassurance.  Acknowledge emotional response to hospitalization.  Recognize and utilize personal coping strategies.  Honor spiritual and cultural preferences.  Recent Flowsheet Documentation  Taken 11/2/2022 2017 by Vidya Tenorio RN  Trust Relationship/Rapport:   care explained   choices provided   questions answered   questions encouraged  Goal: Readiness for Transition of Care  Outcome: Ongoing, Progressing   Goal Outcome Evaluation:  Plan of Care Reviewed With: patient        Progress: improving  Outcome Evaluation: VSS, fundus and lochia WDL, up ad jamel, voiding without difficulty, pain control with PO meds, visits infant in the NICU, pumping

## 2022-11-03 NOTE — PROGRESS NOTES
Postpartum Progress Note      Status post Vaginal Delivery: Doing well postoperatively.     1) postpartum care immediately following delivery : Doing well, routine care   2) Hx of Syphilis - Treated in December with initial titer of 1:64 - as late latent syphilis - Injections 1/5/22, 1/19/22 and 1/26/22. on 3/15/22 repeat titer 1:16 which does show treatment with 4 fold decrease -  6/23/22 recheck 1:8 - indicated continued good response - 9/23/22 back up to 1:16 - on 9/30 Dr. Esteves mentioned repeat PCN x 3 for possible late latent syphilis. Follow up with ID on 10/12/22.  He was not sure the 1:8 to 1:16 was reinfection and felt that repeat PCN given on L&D on 10/3 (she may have gotten two doses a week apart) would be successful in treating if it was. Titer yesterday down to 1:4 indicative of full treatment. Not sure if nursery needs HIV/Hep B and C repeat for third trimester.   3) Chronic HTN - No medication - BP good postpartum so far, continue to follow trend.     Rh status: O positive   Rubella: immune  Gender: Female     Subjective     Postpartum Day 1: Vaginal delivery    The patient feels well. The patient denies emotional concerns. Pain is well controlled with current medications. The baby is well. The patient is ambulating well. The patient is tolerating a normal diet.     Objective     Vital signs in last 24 hours:  Temp:  [97.5 °F (36.4 °C)-98.3 °F (36.8 °C)] 98.3 °F (36.8 °C)  Heart Rate:  [] 77  Resp:  [16] 16  BP: ()/(36-88) 112/78      General:    alert, appears stated age and cooperative   Abdomen:  Soft, Non-tender    Lochia:  appropriate   Uterine Fundus:   firm   Ext    Edema 1+   DVT Evaluation:  No evidence of DVT seen on physical exam.     Lab Results   Component Value Date    WBC 13.79 (H) 11/03/2022    HGB 10.7 (L) 11/03/2022    HCT 32.1 (L) 11/03/2022    MCV 82.7 11/03/2022     11/03/2022       Chaparro Doyle MD  11/3/2022  09:03 EDT

## 2022-11-03 NOTE — LACTATION NOTE
This note was copied from a baby's chart.  Pt, T baby- admitted to NICU. Mom is positive for amphetamines, but she still wants to pump and dump, so HGP initiated at this time with instructions of use, cleaning the parts and milk storage. Encouraged PT to pump every 3h for 15 min. on each breast. Educated on the importance of stimulation for adequate milk supply. Mom has PBP. Encouraged to call if needing further help.

## 2022-11-03 NOTE — LACTATION NOTE
P2T. Infant in NICU. Patient has HGP at bedside and said she is getting colostrum. She is pumping and dumping due to substance abuse . Denies questions regarding pump use . Has LC contact numbers.

## 2022-11-04 VITALS
DIASTOLIC BLOOD PRESSURE: 86 MMHG | BODY MASS INDEX: 32.43 KG/M2 | TEMPERATURE: 98.3 F | HEART RATE: 78 BPM | SYSTOLIC BLOOD PRESSURE: 126 MMHG | WEIGHT: 214 LBS | OXYGEN SATURATION: 99 % | RESPIRATION RATE: 16 BRPM | HEIGHT: 68 IN

## 2022-11-04 PROCEDURE — 25010000002 INFLUENZA VAC SPLIT QUAD 0.5 ML SUSPENSION PREFILLED SYRINGE: Performed by: OBSTETRICS & GYNECOLOGY

## 2022-11-04 PROCEDURE — 90791 PSYCH DIAGNOSTIC EVALUATION: CPT | Performed by: SOCIAL WORKER

## 2022-11-04 PROCEDURE — G0008 ADMIN INFLUENZA VIRUS VAC: HCPCS | Performed by: OBSTETRICS & GYNECOLOGY

## 2022-11-04 PROCEDURE — 90686 IIV4 VACC NO PRSV 0.5 ML IM: CPT | Performed by: OBSTETRICS & GYNECOLOGY

## 2022-11-04 PROCEDURE — 0503F POSTPARTUM CARE VISIT: CPT | Performed by: NURSE PRACTITIONER

## 2022-11-04 RX ORDER — PSEUDOEPHEDRINE HCL 30 MG
100 TABLET ORAL 2 TIMES DAILY
Qty: 60 CAPSULE | Refills: 1 | Status: SHIPPED | OUTPATIENT
Start: 2022-11-04 | End: 2022-12-14

## 2022-11-04 RX ORDER — IBUPROFEN 600 MG/1
600 TABLET ORAL EVERY 8 HOURS PRN
Qty: 90 TABLET | Refills: 1 | Status: SHIPPED | OUTPATIENT
Start: 2022-11-04 | End: 2022-12-14

## 2022-11-04 RX ORDER — HYDROCODONE BITARTRATE AND ACETAMINOPHEN 5; 325 MG/1; MG/1
TABLET ORAL
Qty: 12 TABLET | Refills: 0 | Status: SHIPPED | OUTPATIENT
Start: 2022-11-04 | End: 2022-12-14

## 2022-11-04 RX ADMIN — IBUPROFEN 600 MG: 600 TABLET, FILM COATED ORAL at 02:03

## 2022-11-04 RX ADMIN — Medication 1 TABLET: at 10:18

## 2022-11-04 RX ADMIN — DOCUSATE SODIUM 100 MG: 100 CAPSULE, LIQUID FILLED ORAL at 10:18

## 2022-11-04 RX ADMIN — INFLUENZA VIRUS VACCINE 0.5 ML: 15; 15; 15; 15 SUSPENSION INTRAMUSCULAR at 13:22

## 2022-11-04 NOTE — PROGRESS NOTES
Continued Stay Note  [facility]     Patient Name: Debbie Bauman  MRN: [id number]  Today's Date: 11/4/2022    Admit Date: 11/2/2022    Plan: Infant may discharge to mother when medically ready; CSW will follow cord. JOHN Amanda.   Discharge Plan     Row Name 11/04/22 0857       Plan    Plan Infant may discharge to mother when medically ready; CSW will follow cord. JOHN Amanda.    Plan Comments Mother: Debbie Bauman, MRN [id number]; Infant: Jules Bauman, MRN [id number]. CSW searched the status of report (WebID#[id number]) via CPS’s website, and CSW was informed that the report DOES NOT MEET criteria for investigation at this time. CSW will follow cord toxicology, and re-report to CPS if warranted. CSW will remain available for psychosocial needs while infant is in the NICU. JOHN Amanda.               Discharge Codes    No documentation.                     JOSE Vela

## 2022-11-04 NOTE — PROGRESS NOTES
Attempted [facility] consult x2; this writer reviewed chart and spoke with LASHA Dasilva. Per RN, pt and FOB have been in/out of the NICU visiting their  daughter; pt legally  from her  and seems to be in a romantic relationship with FOB. Chart review reveals pt scored 12 on [place], no thoughts of harming self noted; UDS positive for amphetamines. [facility] will attempt consult tomorrow; no further needs/concerns noted at this time per RN.

## 2022-11-04 NOTE — CONSULTS
"[facility] consult due to depression and polysubstance abuse:  Patient was admitted on 11/2 and gave birth to a daughter who is currently in the NICU.  She has a 4 year old son that lives with his father.  This baby's father was in the room with patient during the interview.  Patient had one inpatient admission at [facility] in 2018 after a suicide attempt by cutting her wrists.  She states that she has had no attempts thoughts  or self harming behaviors since her son was born four years ago.  She does report being depressed as far back as she can remember.  She spoke with her PCP about this and he prescribed an antidepressant.  She had told him about her marijuana use and he thought the antidepressant might help her quit.  She started smoking marijuana at 10 yrs old, regularly the past year.  Patient smoked daily, several times a day prior to her pregnancy and then cut back but would \"slip\" occsaionally.  She denies any other drug use unless the marijuana she smoked was laced with something and she was unaware.  Her longest period of sobriety was when she was pregnant with her son.  Her parents do not believe in mental health treatment and she has been unable to afford counseling.  She is interested in finding a doctor and a therapist that take her insurance.  She could not rate her depression or anxiety but was tearful throughout the interview and said she was upset now because of the current circumstances.    Gave patient resources that take her insurance-[facility], [facility] and [facility].  "

## 2022-11-04 NOTE — PROGRESS NOTES
Postpartum Progress Note      Status post Vaginal Delivery: Doing well postoperatively.     1) Postpartum care immediately following delivery :    Routine care, discharge home today. Reviewed discharge instructions in detail.     2) Chronic HTN: No current medications. Denies HA, vision changes, RUQ pain. BP normal range. She will f/u in office in 1 week for BP check. Reviewed preeclampsia precautions    3) Hx syphilis- s/p treatment. Followed by ID (Dr. Vela). Titer yesterday down to 1:4 indicative of full treatment    Rh status: O+  Rubella: Immune  Gender: Female    Subjective     Postpartum Day 2: Vaginal delivery    The patient feels well. The patient denies emotional concerns. Pain is well controlled with current medications. The baby is well in NICU. The patient is ambulating well. The patient is tolerating a normal diet.     Objective     Vital signs in last 24 hours:  Temp:  [97.9 °F (36.6 °C)-98.5 °F (36.9 °C)] 98 °F (36.7 °C)  Heart Rate:  [63-87] 84  Resp:  [16-18] 16  BP: (120-135)/(75-86) 135/86      General:    alert, appears stated age and cooperative   CV: RRR, no m/r/g   Lungs: CTAB   Abdomen:  Soft, Non-tender    Lochia:  appropriate   Uterine Fundus:   firm   Ext    Edema trace   DVT Evaluation:  No evidence of DVT seen on physical exam.     Lab Results   Component Value Date    WBC 13.79 (H) 11/03/2022    HGB 10.7 (L) 11/03/2022    HCT 32.1 (L) 11/03/2022    MCV 82.7 11/03/2022     11/03/2022       Lisset Fletcher, APRN  11/4/2022  08:53 EDT

## 2022-11-07 LAB
AMPHETAMINES UR QL: NEGATIVE
LABORATORY COMMENT REPORT: NORMAL

## 2022-11-07 NOTE — PROGRESS NOTES
Continued Stay Note  [facility]     Patient Name: Debbie Bauman  MRN: [id number]  Today's Date: 11/7/2022    Admit Date: 11/2/2022    Plan: Infant may discharge to mother when medically ready; CSW will follow cord. JOHN Amanda.   Discharge Plan     Row Name 11/07/22 1212       Plan    Plan Comments Mother: Debbie Bauman, MRN [id number]; Infant: Jules Bauman, MRN [id number]. CSW has reviewed infant’s cord toxicology results and infant’s cord was negative for substances. Mandated CPS reporting is not required at this time. JOHN Amanda.               Discharge Codes    No documentation.               Expected Discharge Date and Time     Expected Discharge Date Expected Discharge Time    Nov 4, 2022             JOSE Vela

## 2022-12-13 ENCOUNTER — POSTPARTUM VISIT (OUTPATIENT)
Dept: OBSTETRICS AND GYNECOLOGY | Facility: CLINIC | Age: 22
End: 2022-12-13

## 2022-12-13 VITALS
BODY MASS INDEX: 32.58 KG/M2 | SYSTOLIC BLOOD PRESSURE: 152 MMHG | WEIGHT: 215 LBS | HEIGHT: 68 IN | DIASTOLIC BLOOD PRESSURE: 101 MMHG

## 2022-12-13 DIAGNOSIS — Z86.19 HISTORY OF LATENT SYPHILIS: ICD-10-CM

## 2022-12-13 PROCEDURE — 0503F POSTPARTUM CARE VISIT: CPT | Performed by: OBSTETRICS & GYNECOLOGY

## 2022-12-13 PROCEDURE — 99214 OFFICE O/P EST MOD 30 MIN: CPT | Performed by: OBSTETRICS & GYNECOLOGY

## 2022-12-13 RX ORDER — MEDROXYPROGESTERONE ACETATE 150 MG/ML
150 INJECTION, SUSPENSION INTRAMUSCULAR
Qty: 1 ML | Refills: 3 | Status: SHIPPED | OUTPATIENT
Start: 2022-12-13

## 2022-12-13 NOTE — PROGRESS NOTES
Subjective      Cc:  Postpartum    Debbie Bauman is a 22 y.o. female who presents for a postpartum visit. She is 6 weeks postpartum following a spontaneous vaginal delivery. I have fully reviewed the prenatal and intrapartum course. The delivery was at 38 gestational weeks and complicated by chronic HTN. Outcome: spontaneous vaginal delivery. Anesthesia: epidural. Postpartum course has been uncomplicated. Baby's course has been uncomplicated. Baby is feeding by bottle. Bleeding has not occurred. Bowel function is normal. Bladder function is normal. Patient is sexually active. Contraception method is desired as Depo Provera. Postpartum depression screening: negative.    The following portions of the patient's history were reviewed and updated as appropriate:   She  has a past medical history of ADHD (attention deficit hyperactivity disorder), Chlamydia, Chronic hypertension in pregnancy (11/05/2018), COVID-19 (01/2022), Depression, Dizziness (01/08/2018), Essential hypertension (01/08/2018), Heart palpitations (01/08/2018), Hypertension, Inattention (02/23/2017), Late latent syphilis (01/03/2022), Major depression, recurrent (HCC) (01/17/2020), Menstrual cycle problem (01/08/2018), Pain in radius (05/22/2017), and Varicella.  She  reports that she quit smoking about 4 years ago. Her smoking use included cigarettes. She smoked an average of .1 packs per day. She has never used smokeless tobacco. She reports that she does not currently use alcohol. She reports current drug use. Frequency: 2.00 times per week. Drug: Marijuana.  Current Outpatient Medications   Medication Sig Dispense Refill   • docusate sodium 100 MG capsule Take 1 capsule by mouth 2 (Two) Times a Day. 60 capsule 1   • HYDROcodone-acetaminophen (NORCO) 5-325 MG per tablet Take 1-2 tablets by mouth every 4-6 hours as needed for moderate pain 12 tablet 0   • ibuprofen (ADVIL,MOTRIN) 600 MG tablet Take 1 tablet by mouth Every 8 (Eight) Hours As Needed  "for Mild Pain. 90 tablet 1   • medroxyPROGESTERone (Depo-Provera) 150 MG/ML injection Inject 1 mL into the appropriate muscle as directed by prescriber Every 3 (Three) Months. 1 mL 3   • Prenatal Vit-Fe Fumarate-FA (prenatal vitamin 28-0.8) 28-0.8 MG tablet tablet Take 1 tablet by mouth Daily. 30 tablet 11     No current facility-administered medications for this visit.     She is allergic to bee venom..    Review of Systems  Constitutional: negative for chills and fevers  Gastrointestinal: negative for nausea and vomiting  Genitourinary:negative for dysuria and frequency  Integument/breast: negative for breast lump and breast tenderness  Behavioral/Psych: negative for anxiety and depression    Objective   BP (!) 152/101   Ht 172.7 cm (67.99\")   Wt 97.5 kg (215 lb)   LMP 02/09/2022 (Exact Date)   Breastfeeding No   BMI 32.70 kg/m²    General:  alert, appears stated age and cooperative    Breasts:  inspection negative, no nipple discharge or bleeding, no masses or nodularity palpable   Lungs: clear to auscultation bilaterally   Heart:  regular rate and rhythm   Abdomen: normal findings: soft, non-tender    Vulva:  normal   Vagina: normal vagina, no discharge, exudate, lesion, or erythema   Cervix:  no cervical motion tenderness   Corpus: normal size, contour, position, consistency, mobility, non-tender   Adnexa:  no mass, fullness, tenderness   Rectal Exam: Not performed.     Assessment & Plan   Normal postpartum exam. Pap smear not done at today's visit.    1. Contraception: Depo-Provera injections.  After exploring multiple options including OCP and IUD, patient is interested in trying Depo Provera.  First injection will be given tomorrow.  2. Syphilis.  Patient to do RPR today.  3. Follow up in: 1 year or as needed.  4. Resume normal activities.    I spent 35 minutes in total with patient.    Anthony Esteves MD           "

## 2022-12-14 ENCOUNTER — CLINICAL SUPPORT (OUTPATIENT)
Dept: OBSTETRICS AND GYNECOLOGY | Facility: CLINIC | Age: 22
End: 2022-12-14

## 2022-12-14 VITALS
SYSTOLIC BLOOD PRESSURE: 149 MMHG | HEIGHT: 67 IN | BODY MASS INDEX: 34.21 KG/M2 | HEART RATE: 89 BPM | WEIGHT: 218 LBS | DIASTOLIC BLOOD PRESSURE: 93 MMHG

## 2022-12-14 DIAGNOSIS — Z30.013 INITIATION OF DEPO PROVERA: Primary | ICD-10-CM

## 2022-12-14 LAB
B-HCG UR QL: NEGATIVE
ERYTHROCYTE [DISTWIDTH] IN BLOOD BY AUTOMATED COUNT: 13.4 % (ref 11.7–15.4)
EXPIRATION DATE: NORMAL
HCT VFR BLD AUTO: 39.7 % (ref 34–46.6)
HGB BLD-MCNC: 12.7 G/DL (ref 11.1–15.9)
INTERNAL NEGATIVE CONTROL: NEGATIVE
INTERNAL POSITIVE CONTROL: POSITIVE
Lab: NORMAL
MCH RBC QN AUTO: 26.5 PG (ref 26.6–33)
MCHC RBC AUTO-ENTMCNC: 32 G/DL (ref 31.5–35.7)
MCV RBC AUTO: 83 FL (ref 79–97)
PLATELET # BLD AUTO: 260 X10E3/UL (ref 150–450)
RBC # BLD AUTO: 4.79 X10E6/UL (ref 3.77–5.28)
RPR SER QL: REACTIVE
RPR SER-TITR: ABNORMAL {TITER}
WBC # BLD AUTO: 7.3 X10E3/UL (ref 3.4–10.8)

## 2022-12-14 PROCEDURE — 81025 URINE PREGNANCY TEST: CPT | Performed by: OBSTETRICS & GYNECOLOGY

## 2022-12-14 PROCEDURE — 96372 THER/PROPH/DIAG INJ SC/IM: CPT | Performed by: OBSTETRICS & GYNECOLOGY

## 2022-12-14 RX ORDER — MEDROXYPROGESTERONE ACETATE 150 MG/ML
150 INJECTION, SUSPENSION INTRAMUSCULAR ONCE
Status: COMPLETED | OUTPATIENT
Start: 2022-12-14 | End: 2022-12-14

## 2022-12-14 RX ADMIN — MEDROXYPROGESTERONE ACETATE 150 MG: 150 INJECTION, SUSPENSION INTRAMUSCULAR at 13:07

## 2022-12-15 ENCOUNTER — TELEPHONE (OUTPATIENT)
Dept: OBSTETRICS AND GYNECOLOGY | Facility: CLINIC | Age: 22
End: 2022-12-15

## 2022-12-15 NOTE — TELEPHONE ENCOUNTER
Spoke with Debbie to let her know that Dr Esteves wanted to let her know that her syphilis testing looks good and your level is now down to 1:4. You can repeat in 6 months to 1 year.

## 2022-12-15 NOTE — TELEPHONE ENCOUNTER
Lianna    Let her know that her syphilis testing looks good and her level is now down to 1:4.  She can repeat in 6 months to one year.    Thanks    Danielito

## 2023-01-01 NOTE — PLAN OF CARE
Goal Outcome Evaluation:  Plan of Care Reviewed With: patient        Progress: improving  Outcome Evaluation: VSS, fundus and lochia WDL, up ad jamel, voiding without difficulty, pain control with PO meds, visits infant in the NICU, pumping, access to see patient today, DC today   Problem: Adult Inpatient Plan of Care  Goal: Plan of Care Review  Outcome: Ongoing, Progressing  Flowsheets  Taken 11/4/2022 0607  Progress: improving  Plan of Care Reviewed With: patient  Taken 11/3/2022 0623  Outcome Evaluation: VSS, fundus and lochia WDL, up ad jamel, voiding without difficulty, pain control with PO meds, visits infant in the NICU, pumping  Goal: Patient-Specific Goal (Individualized)  Outcome: Ongoing, Progressing  Goal: Absence of Hospital-Acquired Illness or Injury  Outcome: Ongoing, Progressing  Intervention: Identify and Manage Fall Risk  Description: Perform standard risk assessment on admission using a validated tool or comprehensive approach appropriate to the patient; reassess fall risk frequently, with change in status or transfer to another level of care.  Communicate fall injury risk to interprofessional healthcare team.  Determine need for increased observation, equipment and environmental modification, such as low bed, signage and supportive, nonskid footwear.  Adjust safety measures to individual developmental age, stage and identified risk factors.  Reinforce the importance of safety and physical activity with patient and family.  Perform regular intentional rounding to assess need for position change, pain assessment and personal needs, including assistance with toileting.  Recent Flowsheet Documentation  Taken 11/4/2022 0450 by Vidya Tenorio, RN  Safety Promotion/Fall Prevention: safety round/check completed  Taken 11/4/2022 0300 by Vidya Tenorio, RN  Safety Promotion/Fall Prevention: (in NICU) patient off unit  Taken 11/4/2022 0203 by Vidya Tenorio, RN  Safety Promotion/Fall Prevention: safety  [Mother] : mother [Father] : father round/check completed  Taken 11/4/2022 0000 by Vidya Tenorio RN  Safety Promotion/Fall Prevention: (in NICU) patient off unit  Taken 11/3/2022 2325 by Vidya Tenorio RN  Safety Promotion/Fall Prevention: safety round/check completed  Taken 11/3/2022 2200 by Vidya Tenorio RN  Safety Promotion/Fall Prevention: (in NICU) patient off unit  Taken 11/3/2022 2010 by Vidya Tenorio RN  Safety Promotion/Fall Prevention: safety round/check completed  Intervention: Prevent Skin Injury  Description: Perform a screening for skin injury risk, such as pressure or moisture associated skin damage on admission and at regular intervals throughout hospital stay.  Keep all areas of skin (especially folds) clean and dry.  Maintain adequate skin hydration.  Relieve and redistribute pressure and protect bony prominences; implement measures based on patient-specific risk factors.  Match turning and repositioning schedule to clinical condition.  Encourage weight shift frequently; assist with reposition if unable to complete independently.  Float heels off bed; avoid pressure on the Achilles tendon.  Keep skin free from extended contact with medical devices.  Encourage functional activity and mobility, as early as tolerated.  Use aids (e.g., slide boards, mechanical lift) during transfer.  Recent Flowsheet Documentation  Taken 11/3/2022 2010 by Vidya Tenorio RN  Body Position: position changed independently  Intervention: Prevent and Manage VTE (Venous Thromboembolism) Risk  Description: Assess for VTE (venous thromboembolism) risk.  Encourage and assist with early ambulation.  Initiate and maintain compression or other therapy, as indicated, based on identified risk in accordance with organizational protocol and provider order.  Encourage both active and passive leg exercises while in bed, if unable to ambulate.  Recent Flowsheet Documentation  Taken 11/3/2022 2010 by Vidya Tenorio RN  Activity Management: up ad jamel  Goal:  Optimal Comfort and Wellbeing  Outcome: Ongoing, Progressing  Intervention: Monitor Pain and Promote Comfort  Description: Assess pain level, treatment efficacy and patient response at regular intervals using a consistent pain scale.  Consider the presence and impact of preexisting chronic pain.  Encourage patient and caregiver involvement in pain assessment, interventions and safety measures.  Recent Flowsheet Documentation  Taken 11/4/2022 0203 by Vidya Tenorio, RN  Pain Management Interventions: see MAR  Intervention: Provide Person-Centered Care  Description: Use a family-focused approach to care.  Develop trust and rapport by proactively providing information, encouraging questions, addressing concerns and offering reassurance.  Acknowledge emotional response to hospitalization.  Recognize and utilize personal coping strategies.  Honor spiritual and cultural preferences.  Recent Flowsheet Documentation  Taken 11/3/2022 2010 by Vidya Tenorio RN  Trust Relationship/Rapport:   care explained   choices provided   questions answered   questions encouraged  Goal: Readiness for Transition of Care  Outcome: Ongoing, Progressing

## 2023-05-09 ENCOUNTER — OFFICE VISIT (OUTPATIENT)
Dept: OBSTETRICS AND GYNECOLOGY | Facility: CLINIC | Age: 23
End: 2023-05-09
Payer: COMMERCIAL

## 2023-05-09 VITALS
HEIGHT: 67 IN | DIASTOLIC BLOOD PRESSURE: 85 MMHG | WEIGHT: 240 LBS | BODY MASS INDEX: 37.67 KG/M2 | SYSTOLIC BLOOD PRESSURE: 133 MMHG

## 2023-05-09 DIAGNOSIS — Z30.09 ENCOUNTER FOR COUNSELING REGARDING CONTRACEPTION: Primary | ICD-10-CM

## 2023-05-10 NOTE — PROGRESS NOTES
Myrna Bauman is a 22 y.o. female.     Cc:  Birth control    History of Present Illness - Patient is a 22 year old female parous times 2 who wants to discuss contraceptive options.  She is currently on Depo Provera and feels that her weight has increased significantly since using Depo.  She had a Kristin IUD in past and had side effects such as cramping and bleeding.  However, she is reconsidering the IUD.      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  She  reports that she quit smoking about 5 years ago. Her smoking use included cigarettes. She smoked an average of .1 packs per day. She has never used smokeless tobacco. She reports that she does not currently use alcohol. She reports current drug use. Frequency: 2.00 times per week. Drug: Marijuana.  Current Outpatient Medications   Medication Sig Dispense Refill   • medroxyPROGESTERone (Depo-Provera) 150 MG/ML injection Inject 1 mL into the appropriate muscle as directed by prescriber Every 3 (Three) Months. (Patient not taking: Reported on 5/9/2023) 1 mL 3     No current facility-administered medications for this visit.     She is allergic to bee venom..    Review of Systems   Constitutional: Positive for unexpected weight change. Negative for chills and fever.       Objective   Physical Exam  Vitals reviewed.   Constitutional:       Appearance: Normal appearance.   Neurological:      Mental Status: She is alert.   Psychiatric:          Mood and Affect: Mood normal.         Behavior: Behavior normal.         Thought Content: Thought content normal.         Judgment: Judgment normal.         Assessment & Plan   Diagnoses and all orders for this visit:    1. Encounter for counseling regarding contraception (Primary)  -  Discussed options including sterilization, IUD and Nexplanon.  Patient does not want to do OCP because of compliance issues.  She is generally satisfied with Depo Provera and we discussed switching to Nexplanon.  Pro/cons discussed.  She will proceed with Nexplanon.    I spent 25 minutes in total in counseling.    Anthony Esteves MD

## 2023-05-26 ENCOUNTER — OFFICE VISIT (OUTPATIENT)
Dept: OBSTETRICS AND GYNECOLOGY | Facility: CLINIC | Age: 23
End: 2023-05-26
Payer: COMMERCIAL

## 2023-05-26 VITALS
DIASTOLIC BLOOD PRESSURE: 84 MMHG | SYSTOLIC BLOOD PRESSURE: 141 MMHG | HEIGHT: 67 IN | WEIGHT: 243 LBS | BODY MASS INDEX: 38.14 KG/M2

## 2023-05-26 DIAGNOSIS — Z30.017 NEXPLANON INSERTION: Primary | ICD-10-CM

## 2023-05-26 DIAGNOSIS — Z86.19 HISTORY OF SYPHILIS: ICD-10-CM

## 2023-05-26 LAB
B-HCG UR QL: NEGATIVE
EXPIRATION DATE: NORMAL
INTERNAL NEGATIVE CONTROL: NEGATIVE
INTERNAL POSITIVE CONTROL: POSITIVE
Lab: NORMAL

## 2023-05-26 NOTE — PROGRESS NOTES
Subdermal Contraceptive Implant Insertion Note    Debbie Bauman desires a subdermal etonogestrel contraceptive implant insertion.  She has been counseled regarding the risks, benefits and alternatives to the implant.  She especially understands that her menstrual periods are expected to become irregular and unpredictable throughout the time she is using the implant.  She has no contraindications to the insertion.  Her questions have been answered.  She has fully reviewed the FDA-approved consent brochure, has signed the consent form, and wishes to proceed with the insertion today.     Current method of contraception:  Depo-Provera    Patient's last menstrual period was 05/06/2023 (exact date).    Urine pregnancy test: negative    Procedure Time Out Documentation       Procedure Details  The inner side of the left arm was cleaned with Betadinex3 and infiltrated with 1% lidocaine.  The contraceptive adelfo was inserted according to the 's instructions without complications.  The adelfo was palpable under the skin after the insertion.  The insertion site was closed with Steri-strip and a pressure dressing was applied.    Lot:  [id number]  Exp:  4/9/2025  NDC:  65953-403-10    Debbie was given post-insertion instructions.  She understands that the implant must be removed at the end of three years and may be removed sooner if she wishes.    Successful insertion of nexplanon device.    Patient tolerated the procedure well without complications.   NSDAIDs and Ice for pain relief.  RPR ordered for syphilis history.    Anthony Esteves MD

## 2023-05-28 LAB
RPR SER QL: ABNORMAL
RPR SER-TITR: ABNORMAL {TITER}

## 2023-05-30 ENCOUNTER — TELEPHONE (OUTPATIENT)
Dept: OBSTETRICS AND GYNECOLOGY | Facility: CLINIC | Age: 23
End: 2023-05-30

## 2023-05-30 NOTE — TELEPHONE ENCOUNTER
Left message for Debbie that Dr Esteves said that your syphilis titer has fallen from 1:4 to 1:2 which is good. We can repeat in 6 months to 1 year. Thank  you

## 2023-05-30 NOTE — TELEPHONE ENCOUNTER
Lianna    Let her know that her syphilis titer has fallen from 1:4 to 1:2 which is good.    We can repeat in 6 months to a year.    Thanks    Danielito

## 2023-08-24 ENCOUNTER — OFFICE VISIT (OUTPATIENT)
Dept: INTERNAL MEDICINE | Facility: CLINIC | Age: 23
End: 2023-08-24
Payer: COMMERCIAL

## 2023-08-24 VITALS
HEART RATE: 94 BPM | WEIGHT: 239.6 LBS | OXYGEN SATURATION: 98 % | HEIGHT: 67 IN | BODY MASS INDEX: 37.61 KG/M2 | TEMPERATURE: 97.7 F | DIASTOLIC BLOOD PRESSURE: 80 MMHG | SYSTOLIC BLOOD PRESSURE: 134 MMHG

## 2023-08-24 DIAGNOSIS — Z56.0 LOSS OF JOB: ICD-10-CM

## 2023-08-24 DIAGNOSIS — M21.621 TAILOR'S BUNION OF RIGHT FOOT: ICD-10-CM

## 2023-08-24 DIAGNOSIS — Z59.86 FINANCIAL INSECURITY: ICD-10-CM

## 2023-08-24 DIAGNOSIS — F33.1 MODERATE EPISODE OF RECURRENT MAJOR DEPRESSIVE DISORDER: Primary | ICD-10-CM

## 2023-08-24 PROBLEM — Z34.90 PREGNANCY: Status: RESOLVED | Noted: 2022-11-02 | Resolved: 2023-08-24

## 2023-08-24 PROBLEM — N92.1 MENORRHAGIA WITH IRREGULAR CYCLE: Status: RESOLVED | Noted: 2018-03-19 | Resolved: 2023-08-24

## 2023-08-24 PROBLEM — O16.9 HYPERTENSION IN PREGNANCY, DELIVERED: Status: RESOLVED | Noted: 2022-11-02 | Resolved: 2023-08-24

## 2023-08-24 PROCEDURE — 99214 OFFICE O/P EST MOD 30 MIN: CPT | Performed by: FAMILY MEDICINE

## 2023-08-24 SDOH — ECONOMIC STABILITY - INCOME SECURITY: UNEMPLOYMENT, UNSPECIFIED: Z56.0

## 2023-08-24 SDOH — ECONOMIC STABILITY - INCOME SECURITY: FINANCIAL INSECURITY: Z59.86
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
## 2023-08-25 ENCOUNTER — REFERRAL TRIAGE (OUTPATIENT)
Dept: CASE MANAGEMENT | Facility: CLINIC | Age: 23
End: 2023-08-25
Payer: COMMERCIAL

## 2023-08-31 ENCOUNTER — PATIENT OUTREACH (OUTPATIENT)
Dept: CASE MANAGEMENT | Facility: CLINIC | Age: 23
End: 2023-08-31
Payer: COMMERCIAL
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
## 2023-10-02 ENCOUNTER — PATIENT MESSAGE (OUTPATIENT)
Dept: INTERNAL MEDICINE | Facility: CLINIC | Age: 23
End: 2023-10-02
Payer: COMMERCIAL
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
## 2023-10-02 RX ORDER — CEPHALEXIN 500 MG/1
500 TABLET ORAL 2 TIMES DAILY
Qty: 10 TABLET | Refills: 0 | Status: SHIPPED | OUTPATIENT
Start: 2023-10-02 | End: 2023-10-07

## 2023-10-02 NOTE — TELEPHONE ENCOUNTER
From: Debbie Bauman  To: Guillermo Ambriz  Sent: 10/2/2023 9:46 AM EDT  Subject: Unknown bump on stomach     I have had this bump on my stomach that hurts by the touch and if my pants are high enough it hurts. I noticed it a little over a month ago but it's not getting better

## 2023-10-23 ENCOUNTER — PATIENT OUTREACH (OUTPATIENT)
Dept: CASE MANAGEMENT | Facility: CLINIC | Age: 23
End: 2023-10-23
Payer: COMMERCIAL

## 2023-10-23 NOTE — OUTREACH NOTE
SW has attempted to reach patient since previous outreach re: resources provided and to check on patient. Unable to reach. Please re consult SW if additional needs arise.     Merissa BARRY -   Ambulatory Case Management    10/23/2023, 09:57 EDT

## 2024-01-08 ENCOUNTER — OFFICE VISIT (OUTPATIENT)
Dept: INTERNAL MEDICINE | Facility: CLINIC | Age: 24
End: 2024-01-08
Payer: COMMERCIAL

## 2024-01-08 VITALS — WEIGHT: 236 LBS | OXYGEN SATURATION: 99 % | HEART RATE: 67 BPM | BODY MASS INDEX: 37.04 KG/M2

## 2024-01-08 DIAGNOSIS — M79.622 LEFT UPPER ARM PAIN: Primary | ICD-10-CM

## 2024-01-08 DIAGNOSIS — M46.1 SACROILIAC INFLAMMATION: ICD-10-CM

## 2024-01-08 DIAGNOSIS — Z97.5 NEXPLANON IN PLACE: ICD-10-CM

## 2024-01-08 PROCEDURE — 99213 OFFICE O/P EST LOW 20 MIN: CPT | Performed by: FAMILY MEDICINE

## 2024-01-08 NOTE — PROGRESS NOTES
Date of Encounter: 2024  Patient: Debbie Bauman,  2000    Subjective   History of Presenting Illness  Chief complaint: Nexplanon    Patient fell a little bit before Tristan after tripping on a step while carrying a package as a .  She fell forward and struck the upper left inner arm where her Nexplanon is in place.  After this fall she had bruising, pain, and swelling in this aspect of the arm.  Those symptoms have resolved but she is concerned that she may have fractured her Nexplanon.  Her periods have been a little irregular recently.  Does not have any arm limitations at this time.    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  Not doing anything for this at this time.    The following portions of the patient's history were reviewed and updated as appropriate: allergies, current medications, past family history, past medical history, past social history, past surgical history and problem list.    Patient Active Problem List   Diagnosis    Family history of diabetes mellitus    Major depressive disorder    False positive serology for HIV    Cyst of left ovary    Polysubstance abuse    Seasonal allergic rhinitis due to pollen    Dyspepsia    Late latent syphilis    Tailor's bunion of right foot    Sacroiliac inflammation    Nexplanon in place     Past Medical History:   Diagnosis Date    ADHD (attention deficit hyperactivity disorder)     Chlamydia         Chronic hypertension in pregnancy 2018    COVID-19 2022    Depression     Dizziness 2018    Essential hypertension 2018    Heart palpitations 2018    Hypertension     Inattention 2017    Late latent syphilis 2022    Major depression, recurrent 2020    Menorrhagia with irregular cycle 2018    Menstrual cycle problem 2018    Pain in radius 2017    Varicella      Past  Surgical History:   Procedure Laterality Date    TONSILLECTOMY      WISDOM TOOTH EXTRACTION       Family History   Problem Relation Age of Onset    Heart attack Father     Diabetes Father     Hypertension Father     Ovarian cancer Mother     Stroke Paternal Grandfather     Heart disease Maternal Grandmother        Current Outpatient Medications:     Etonogestrel (NEXPLANON SC), Inject  under the skin into the appropriate area as directed., Disp: , Rfl:   Allergies   Allergen Reactions    Bee Venom Swelling     Social History     Tobacco Use    Smoking status: Former     Packs/day: .1     Types: Cigarettes     Quit date: 3/28/2018     Years since quittin.7    Smokeless tobacco: Never   Vaping Use    Vaping Use: Former    Substances: THC    Devices: Refillable tank   Substance Use Topics    Alcohol use: Not Currently     Comment: occasionally    Drug use: Yes     Frequency: 2.0 times per week     Types: Marijuana          Objective   Physical Exam  Vitals:    24 1102   Pulse: 67   SpO2: 99%   Weight: 107 kg (236 lb)     Body mass index is 37.04 kg/m².    Constitutional: NAD.  Musculoskeletal: Possible subtle defect in the mid body of the Nexplanon but I do not appreciate any obvious device fracture on examination by palpation.  Point tenderness to palpation of the superior aspect of the left sacroiliac joint.  There is no overlying bruise or swelling.  Function of the left upper extremity is unremarkable.  Psychiatric: Normal affect. Normal thought content.     Assessment & Plan   Assessment and Plan  Pleasant 23-year-old female with major depressive disorder, polysubstance use disorder, dyspepsia, who presents with the following:      Diagnoses and all orders for this visit:    Diagnoses and all orders for this visit:    1. Left upper arm pain (Primary): X-ray to evaluate for device fracture, but otherwise no symptoms in extremity.  If device fracture or concern for device fracture patient amenable to  replacement  -     XR humerus left; Future  2. Nexplanon in place  -     XR humerus left; Future    3. Sacroiliac inflammation: Recommend anti-inflammatories, cushion seating, and basic sacroiliac and pelvic exercises which I have gave her a handout for.  If not improving we will pursue imaging and physical therapy.    Guillermo Ambriz MD  [facility]  O: [phone]    Disclaimer: Parts of this note were dictated by speech recognition. Minor errors in transcription may be present. Please call if questions.

## 2024-01-09 ENCOUNTER — HOSPITAL ENCOUNTER (OUTPATIENT)
Dept: GENERAL RADIOLOGY | Facility: HOSPITAL | Age: 24
Discharge: HOME | End: 2024-01-09
Admitting: FAMILY MEDICINE
Payer: COMMERCIAL

## 2024-01-09 DIAGNOSIS — Z97.5 NEXPLANON IN PLACE: ICD-10-CM

## 2024-01-09 DIAGNOSIS — M79.622 LEFT UPPER ARM PAIN: ICD-10-CM

## 2024-01-09 PROCEDURE — 73060 X-RAY EXAM OF HUMERUS: CPT

## 2024-01-12 NOTE — PROGRESS NOTES
The good news is that the nexplanon appears to be intact and there is no evidence of device fracture. I do not recommend device removal at this time.

## 2024-02-15 ENCOUNTER — APPOINTMENT (OUTPATIENT)
Dept: CT IMAGING | Facility: HOSPITAL | Age: 24
End: 2024-02-15
Payer: COMMERCIAL

## 2024-02-15 ENCOUNTER — HOSPITAL ENCOUNTER (EMERGENCY)
Facility: HOSPITAL | Age: 24
Discharge: HOME | End: 2024-02-15
Attending: EMERGENCY MEDICINE
Payer: COMMERCIAL

## 2024-02-15 VITALS
OXYGEN SATURATION: 99 % | TEMPERATURE: 97.8 F | HEART RATE: 74 BPM | SYSTOLIC BLOOD PRESSURE: 117 MMHG | DIASTOLIC BLOOD PRESSURE: 73 MMHG | RESPIRATION RATE: 16 BRPM
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
## 2024-02-15 DIAGNOSIS — S16.1XXA STRAIN OF NECK MUSCLE, INITIAL ENCOUNTER: Primary | ICD-10-CM

## 2024-02-15 PROCEDURE — 99284 EMERGENCY DEPT VISIT MOD MDM: CPT

## 2024-02-15 PROCEDURE — 72125 CT NECK SPINE W/O DYE: CPT

## 2024-02-15 RX ORDER — LIDOCAINE 50 MG/G
1 PATCH TOPICAL EVERY 24 HOURS
Qty: 5 EACH | Refills: 0 | Status: SHIPPED | OUTPATIENT
Start: 2024-02-15

## 2024-02-15 RX ORDER — ACETAMINOPHEN 500 MG
1000 TABLET ORAL ONCE
Status: COMPLETED | OUTPATIENT
Start: 2024-02-15 | End: 2024-02-15

## 2024-02-15 RX ORDER — LIDOCAINE 50 MG/G
1 PATCH TOPICAL EVERY 24 HOURS
Qty: 5 EACH | Refills: 0 | Status: SHIPPED | OUTPATIENT
Start: 2024-02-15 | End: 2024-02-15 | Stop reason: SDUPTHER

## 2024-02-15 RX ORDER — ACETAMINOPHEN 500 MG
1000 TABLET ORAL EVERY 6 HOURS PRN
Qty: 20 TABLET | Refills: 0 | Status: SHIPPED | OUTPATIENT
Start: 2024-02-15

## 2024-02-15 RX ORDER — ACETAMINOPHEN 500 MG
1000 TABLET ORAL EVERY 6 HOURS PRN
Qty: 20 TABLET | Refills: 0 | Status: SHIPPED | OUTPATIENT
Start: 2024-02-15 | End: 2024-02-15 | Stop reason: SDUPTHER

## 2024-02-15 RX ADMIN — ACETAMINOPHEN 1000 MG: 500 TABLET ORAL at 20:31

## 2024-02-16 NOTE — DISCHARGE INSTRUCTIONS
You have been given emergency department evaluation.  This evaluation is intended to rule out life-threatening conditions.  Is not a complete evaluation.  You could require further testing as determined by your primary care physician or any referred specialist.  Please follow-up with all doctors that you are referred to.  Please be sure to take your prescribed medications and follow any specific instructions in the discharge instructions.  Please follow-up with your primary care physician within 48 hours.  Please have your primary care provider recheck your blood pressure.  Do not take any Tylenol or other medications containing Tylenol if you take the prescription as written as you are do not exceed 4000 mg of Tylenol/acetaminophen in a 24-hour timeframe.  Please return to the emergency department if you experience chest pain, shortness of breath, abdominal pain, fever greater than 102, intractable vomiting.  Please return to the emergency department if your symptoms continue or worsen, or if you begin to experience any other concerning symptom.
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
## 2024-02-19 ENCOUNTER — OFFICE VISIT (OUTPATIENT)
Dept: INTERNAL MEDICINE | Facility: CLINIC | Age: 24
End: 2024-02-19
Payer: COMMERCIAL

## 2024-02-19 VITALS
DIASTOLIC BLOOD PRESSURE: 64 MMHG | TEMPERATURE: 96 F | RESPIRATION RATE: 16 BRPM | OXYGEN SATURATION: 94 % | HEIGHT: 67 IN | SYSTOLIC BLOOD PRESSURE: 114 MMHG | HEART RATE: 84 BPM | BODY MASS INDEX: 35.79 KG/M2 | WEIGHT: 228 LBS

## 2024-02-19 DIAGNOSIS — J01.00 ACUTE NON-RECURRENT MAXILLARY SINUSITIS: Primary | ICD-10-CM

## 2024-02-19 DIAGNOSIS — A52.8 LATE LATENT SYPHILIS: ICD-10-CM

## 2024-02-19 DIAGNOSIS — Z13.9 SCREENING FOR UNSPECIFIED CONDITION: ICD-10-CM

## 2024-02-19 DIAGNOSIS — Z11.3 ROUTINE SCREENING FOR STI (SEXUALLY TRANSMITTED INFECTION): ICD-10-CM

## 2024-02-19 PROCEDURE — 99214 OFFICE O/P EST MOD 30 MIN: CPT | Performed by: FAMILY MEDICINE

## 2024-02-19 RX ORDER — CEFDINIR 300 MG/1
300 CAPSULE ORAL 2 TIMES DAILY
Qty: 14 CAPSULE | Refills: 0 | Status: SHIPPED | OUTPATIENT
Start: 2024-02-19 | End: 2024-02-26
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
## 2024-02-22 LAB
ALBUMIN SERPL-MCNC: 4.3 G/DL (ref 4–5)
ALBUMIN/GLOB SERPL: 1.5 {RATIO} (ref 1.2–2.2)
ALP SERPL-CCNC: 85 IU/L (ref 44–121)
ALT SERPL-CCNC: 16 IU/L (ref 0–32)
APPEARANCE UR: CLEAR
AST SERPL-CCNC: 14 IU/L (ref 0–40)
BACTERIA #/AREA URNS HPF: NORMAL /[HPF]
BASOPHILS # BLD AUTO: 0 X10E3/UL (ref 0–0.2)
BASOPHILS NFR BLD AUTO: 1 %
BILIRUB SERPL-MCNC: 0.2 MG/DL (ref 0–1.2)
BILIRUB UR QL STRIP: NEGATIVE
BUN SERPL-MCNC: 8 MG/DL (ref 6–20)
BUN/CREAT SERPL: 11 (ref 9–23)
C TRACH RRNA SPEC QL NAA+PROBE: NEGATIVE
CALCIUM SERPL-MCNC: 9.4 MG/DL (ref 8.7–10.2)
CASTS URNS QL MICRO: NORMAL /LPF
CHLORIDE SERPL-SCNC: 107 MMOL/L (ref 96–106)
CHOLEST SERPL-MCNC: 158 MG/DL (ref 100–199)
CO2 SERPL-SCNC: 23 MMOL/L (ref 20–29)
COLOR UR: YELLOW
CREAT SERPL-MCNC: 0.76 MG/DL (ref 0.57–1)
EGFRCR SERPLBLD CKD-EPI 2021: 111 ML/MIN/1.73
EOSINOPHIL # BLD AUTO: 0.4 X10E3/UL (ref 0–0.4)
EOSINOPHIL NFR BLD AUTO: 4 %
EPI CELLS #/AREA URNS HPF: NORMAL /HPF (ref 0–10)
ERYTHROCYTE [DISTWIDTH] IN BLOOD BY AUTOMATED COUNT: 13.7 % (ref 11.7–15.4)
FT4I SERPL CALC-MCNC: 2.3 (ref 1.2–4.9)
GLOBULIN SER CALC-MCNC: 2.8 G/DL (ref 1.5–4.5)
GLUCOSE SERPL-MCNC: 69 MG/DL (ref 70–99)
GLUCOSE UR QL STRIP: NEGATIVE
HBA1C MFR BLD: 5.5 % (ref 4.8–5.6)
HCT VFR BLD AUTO: 42 % (ref 34–46.6)
HDLC SERPL-MCNC: 35 MG/DL
HGB BLD-MCNC: 13.6 G/DL (ref 11.1–15.9)
HGB UR QL STRIP: ABNORMAL
HIV 1 & 2 AB SERPLBLD IA.RAPID: NEGATIVE
HIV 1+2 AB+HIV1 P24 AG SERPL QL IA: NORMAL
HIV 2 AB SERPLBLD QL IA.RAPID: NON REACTIVE
HIV 2 RNA SERPL QL NAA+PROBE: NON REACTIVE
HIV IA ALGORITHM INTERP SERPLBLD-IMP: NORMAL
HIV1 AB SERPLBLD QL IA.RAPID: NON REACTIVE
HIV1 RNA SERPL QL NAA+PROBE: NON REACTIVE
IMM GRANULOCYTES # BLD AUTO: 0 X10E3/UL (ref 0–0.1)
IMM GRANULOCYTES NFR BLD AUTO: 0 %
KETONES UR QL STRIP: NEGATIVE
LDLC SERPL CALC-MCNC: 110 MG/DL (ref 0–99)
LEUKOCYTE ESTERASE UR QL STRIP: NEGATIVE
LYMPHOCYTES # BLD AUTO: 2.2 X10E3/UL (ref 0.7–3.1)
LYMPHOCYTES NFR BLD AUTO: 26 %
MCH RBC QN AUTO: 27.5 PG (ref 26.6–33)
MCHC RBC AUTO-ENTMCNC: 32.4 G/DL (ref 31.5–35.7)
MCV RBC AUTO: 85 FL (ref 79–97)
MICRO URNS: ABNORMAL
MONOCYTES # BLD AUTO: 0.7 X10E3/UL (ref 0.1–0.9)
MONOCYTES NFR BLD AUTO: 8 %
N GONORRHOEA RRNA SPEC QL NAA+PROBE: NEGATIVE
NEUTROPHILS # BLD AUTO: 5.2 X10E3/UL (ref 1.4–7)
NEUTROPHILS NFR BLD AUTO: 61 %
NITRITE UR QL STRIP: NEGATIVE
PH UR STRIP: 6.5 [PH] (ref 5–7.5)
PLATELET # BLD AUTO: 247 X10E3/UL (ref 150–450)
POTASSIUM SERPL-SCNC: 4.5 MMOL/L (ref 3.5–5.2)
PROT SERPL-MCNC: 7.1 G/DL (ref 6–8.5)
PROT UR QL STRIP: NEGATIVE
RBC # BLD AUTO: 4.94 X10E6/UL (ref 3.77–5.28)
RBC #/AREA URNS HPF: NORMAL /HPF (ref 0–2)
RPR SER-TITR: ABNORMAL TITER
SODIUM SERPL-SCNC: 143 MMOL/L (ref 134–144)
SP GR UR STRIP: 1.01 (ref 1–1.03)
T VAGINALIS RRNA SPEC QL NAA+PROBE: NEGATIVE
T3RU NFR SERPL: 27 % (ref 24–39)
T4 SERPL-MCNC: 8.4 UG/DL (ref 4.5–12)
TRIGL SERPL-MCNC: 64 MG/DL (ref 0–149)
TSH SERPL DL<=0.005 MIU/L-ACNC: 0.78 UIU/ML (ref 0.45–4.5)
UROBILINOGEN UR STRIP-MCNC: 0.2 MG/DL (ref 0.2–1)
VLDLC SERPL CALC-MCNC: 13 MG/DL (ref 5–40)
WBC # BLD AUTO: 8.5 X10E3/UL (ref 3.4–10.8)
WBC #/AREA URNS HPF: NORMAL /HPF (ref 0–5)

## 2024-02-26 NOTE — PROGRESS NOTES
STD tests are negative, blood work looks great, syphilis markers continue to go down which suggest that you do not have a new infection.

## 2024-07-24 ENCOUNTER — OFFICE VISIT (OUTPATIENT)
Dept: OBSTETRICS AND GYNECOLOGY | Facility: CLINIC | Age: 24
End: 2024-07-24
Payer: COMMERCIAL

## 2024-07-24 VITALS
HEIGHT: 67 IN | DIASTOLIC BLOOD PRESSURE: 78 MMHG | BODY MASS INDEX: 33.12 KG/M2 | WEIGHT: 211 LBS | SYSTOLIC BLOOD PRESSURE: 132 MMHG

## 2024-07-24 DIAGNOSIS — N76.0 ACUTE VAGINITIS: ICD-10-CM

## 2024-07-24 DIAGNOSIS — Z01.419 VISIT FOR GYNECOLOGIC EXAMINATION: Primary | ICD-10-CM

## 2024-07-24 DIAGNOSIS — N93.8 DYSFUNCTIONAL UTERINE BLEEDING: ICD-10-CM

## 2024-07-24 RX ORDER — ESTRADIOL 2 MG/1
2 TABLET ORAL DAILY
Qty: 14 TABLET | Refills: 0 | Status: SHIPPED | OUTPATIENT
Start: 2024-07-24 | End: 2024-08-07

## 2024-07-24 NOTE — PROGRESS NOTES
[facility]  [address]  [place], [place] [address]  Phone: [phone] / Fax:  [phone]      07/24/2024    [address] [place], [place] [id number]    Guillermo Ambriz MD    Chief Complaint   Patient presents with    Gynecologic Exam     Annual Exam, last pap 2-14-20 NL.. Patient with Nexplanon in place since 5-26-23. Patient states while at work she fell and hit area of arm where implant is located.  She states since then her bleeding has been every 2 weeks. She states she had a CT performed and device intact. She also is c/o vaginal odor.       Debbie Bauman is here for annual gynecologic exam.  HPI - Patient with normal pap 4 years ago.  She has a Nexplanon in place and has generally been satisfied with it for contraception.  However, she suffered trauma to her arm and was concerned about damage to capsule.  Imaging revealed that capsule was intact.  However, patient began to experience some irregular cycles over the past few months.  She reports vaginal odor.  She also reports breast discharge.    Past Medical History:   Diagnosis Date    ADHD (attention deficit hyperactivity disorder)     Chlamydia     6/18    Chronic hypertension in pregnancy 11/05/2018    COVID-19 01/2022    Depression     Dizziness 01/08/2018    Essential hypertension 01/08/2018    Heart palpitations 01/08/2018    Hypertension     Inattention 02/23/2017    Late latent syphilis 01/03/2022    Major depression, recurrent 01/17/2020    Menorrhagia with irregular cycle 03/19/2018    Menstrual cycle problem 01/08/2018    Pain in radius 05/22/2017    Varicella        Past Surgical History:   Procedure Laterality Date    TONSILLECTOMY      WISDOM TOOTH EXTRACTION         Allergies   Allergen Reactions    Bee Venom Swelling       Social History     Socioeconomic History    Marital status: Legally     Number of children: 0   Tobacco Use    Smoking status: Former     Current packs/day: 0.00     Types:  "Cigarettes     Quit date: 3/28/2018     Years since quittin.3    Smokeless tobacco: Never   Vaping Use    Vaping status: Former    Substances: THC    Devices: Refillable tank   Substance and Sexual Activity    Alcohol use: Not Currently     Comment: occasionally    Drug use: Yes     Frequency: 2.0 times per week     Types: Marijuana    Sexual activity: Yes     Partners: Male     Birth control/protection: Nexplanon       Family History   Problem Relation Age of Onset    Heart attack Father     Diabetes Father     Hypertension Father     Ovarian cancer Mother     Stroke Paternal Grandfather     Heart disease Maternal Grandmother        No LMP recorded. Patient has had an implant.    OB History          3    Para   2    Term   2            AB   1    Living   2         SAB   1    IAB        Ectopic        Molar        Multiple   0    Live Births   2                Vitals:    24 1257   BP: 132/78   Weight: 95.7 kg (211 lb)   Height: 170.2 cm (67\")       Physical Exam  Constitutional:       Appearance: Normal appearance. She is well-developed.   Genitourinary:      Bladder and urethral meatus normal.      Right Labia: No tenderness or lesions.     Left Labia: No tenderness or lesions.     Vaginal discharge present.      No vaginal bleeding.        Right Adnexa: not tender and not full.     Left Adnexa: not tender and not full.     No cervical motion tenderness or lesion.      Uterus is not enlarged or tender.      No urethral tenderness or hypermobility present.   Breasts:     Right: No mass or nipple discharge.      Left: No mass or nipple discharge.   HENT:      Right Ear: External ear normal.      Left Ear: External ear normal.      Nose: Nose normal.   Eyes:      Conjunctiva/sclera: Conjunctivae normal.   Neck:      Thyroid: No thyromegaly.   Cardiovascular:      Rate and Rhythm: Normal rate and regular rhythm.      Heart sounds: Normal heart sounds.   Pulmonary:      Effort: Pulmonary effort " is normal.      Breath sounds: No stridor. No wheezing.   Abdominal:      Palpations: Abdomen is soft.      Tenderness: There is no abdominal tenderness. There is no guarding or rebound.   Musculoskeletal:         General: Normal range of motion.      Cervical back: Normal range of motion and neck supple.   Neurological:      Mental Status: She is alert.      Coordination: Coordination normal.   Skin:     General: Skin is warm and dry.   Psychiatric:         Mood and Affect: Mood normal.         Behavior: Behavior normal.         Thought Content: Thought content normal.         Judgment: Judgment normal.   Vitals reviewed. Exam conducted with a chaperone present.         Diagnoses and all orders for this visit:    1. Visit for gynecologic examination (Primary)  -     IGP, Rfx Aptima HPV ASCU  -     Discussed importance of regular screening and breast awareness.    2. Dysfunctional uterine bleeding  -     CBC (No Diff)  -     TSH  -     Prolactin  -     estradiol (Estrace) 2 MG tablet; Take 1 tablet by mouth Daily for 14 days.  Dispense: 14 tablet; Refill: 0  -     Check labs.  Estrogen to stabilize endometrium.  If this is unsuccessful, consider higher dose OCP.    3. Acute vaginitis  -     NuSwab VG+ - Swab, Vagina  -     Await swab.        Anthony Esteves MD

## 2024-07-25 LAB
ERYTHROCYTE [DISTWIDTH] IN BLOOD BY AUTOMATED COUNT: 13.4 % (ref 11.7–15.4)
HCT VFR BLD AUTO: 39.2 % (ref 34–46.6)
HGB BLD-MCNC: 12.8 G/DL (ref 11.1–15.9)
MCH RBC QN AUTO: 28.4 PG (ref 26.6–33)
MCHC RBC AUTO-ENTMCNC: 32.7 G/DL (ref 31.5–35.7)
MCV RBC AUTO: 87 FL (ref 79–97)
PLATELET # BLD AUTO: 221 X10E3/UL (ref 150–450)
PROLACTIN SERPL-MCNC: 17.5 NG/ML (ref 4.8–33.4)
RBC # BLD AUTO: 4.5 X10E6/UL (ref 3.77–5.28)
TSH SERPL DL<=0.005 MIU/L-ACNC: 0.5 UIU/ML (ref 0.45–4.5)
WBC # BLD AUTO: 8.3 X10E3/UL (ref 3.4–10.8)

## 2024-07-27 LAB
CONV .: NORMAL
CYTOLOGIST CVX/VAG CYTO: NORMAL
CYTOLOGY CVX/VAG DOC CYTO: NORMAL
CYTOLOGY CVX/VAG DOC THIN PREP: NORMAL
DX ICD CODE: NORMAL
Lab: NORMAL
OTHER STN SPEC: NORMAL
STAT OF ADQ CVX/VAG CYTO-IMP: NORMAL

## 2024-07-28 ENCOUNTER — TELEPHONE (OUTPATIENT)
Dept: OBSTETRICS AND GYNECOLOGY | Facility: CLINIC | Age: 24
End: 2024-07-28
Payer: COMMERCIAL

## 2024-07-28 LAB
A VAGINAE DNA VAG QL NAA+PROBE: ABNORMAL SCORE
BVAB2 DNA VAG QL NAA+PROBE: ABNORMAL SCORE
C ALBICANS DNA VAG QL NAA+PROBE: POSITIVE
C GLABRATA DNA VAG QL NAA+PROBE: NEGATIVE
C TRACH DNA SPEC QL NAA+PROBE: NEGATIVE
MEGA1 DNA VAG QL NAA+PROBE: ABNORMAL SCORE
N GONORRHOEA DNA VAG QL NAA+PROBE: NEGATIVE
T VAGINALIS DNA VAG QL NAA+PROBE: NEGATIVE

## 2024-07-28 RX ORDER — FLUCONAZOLE 150 MG/1
150 TABLET ORAL ONCE
Qty: 1 TABLET | Refills: 0 | Status: SHIPPED | OUTPATIENT
Start: 2024-07-28 | End: 2024-07-28

## 2024-07-28 NOTE — TELEPHONE ENCOUNTER
Lianna    Let her know that her labs were normal.   I would like her to take the medication I prescribed.  Also, she has a yeast infection.  I called in medication.    Thanks    Danielito

## 2024-07-29 NOTE — TELEPHONE ENCOUNTER
Spoke with Debbie to let her know that Dr Esteves said her labs were normal.   I would like her to take the medication I prescribed.  Also, she has a yeast infection.  I called in medication. A yeast infection is not an STD.

## 2024-08-04 DIAGNOSIS — N93.8 DYSFUNCTIONAL UTERINE BLEEDING: ICD-10-CM

## 2024-08-05 RX ORDER — ESTRADIOL 2 MG/1
2 TABLET ORAL DAILY
Qty: 14 TABLET | Refills: 0 | OUTPATIENT
Start: 2024-08-05 | End: 2024-08-19

## 2024-08-16 ENCOUNTER — PATIENT MESSAGE (OUTPATIENT)
Dept: INTERNAL MEDICINE | Facility: CLINIC | Age: 24
End: 2024-08-16
Payer: COMMERCIAL

## 2024-08-16 DIAGNOSIS — F33.1 MODERATE EPISODE OF RECURRENT MAJOR DEPRESSIVE DISORDER: Primary | ICD-10-CM

## 2024-08-20 NOTE — TELEPHONE ENCOUNTER
From: Debbie Bauman  To: Guillermo Ambriz  Sent: 8/16/2024 5:16 PM EDT  Subject: Anti depressants     Hey I know you tried putting me on a plan before with a low dose of antidepressants. I ended up not going through because I ran out and was an emotional wreck and thought I became addicted. But I do realized now that I have to wean myself off and not instantly stop as soon as I run out. I wanted to know if I am able to continue with the medication or do I have to come in for the consultation again. I had an appointment on Tuesday but I can't keep missing work with my two kids and all their appointments so unfortunately had to cancel.

## 2024-09-09 RX ORDER — NORETHINDRONE AND ETHINYL ESTRADIOL 0.4-0.035
1 KIT ORAL DAILY
Qty: 28 TABLET | Refills: 0 | Status: SHIPPED | OUTPATIENT
Start: 2024-09-09

## 2024-10-10 ENCOUNTER — OFFICE VISIT (OUTPATIENT)
Dept: OBSTETRICS AND GYNECOLOGY | Facility: CLINIC | Age: 24
End: 2024-10-10
Payer: COMMERCIAL

## 2024-10-10 VITALS
BODY MASS INDEX: 30.92 KG/M2 | WEIGHT: 197 LBS | DIASTOLIC BLOOD PRESSURE: 90 MMHG | HEIGHT: 67 IN | SYSTOLIC BLOOD PRESSURE: 164 MMHG

## 2024-10-10 DIAGNOSIS — Z30.46 SURVEILLANCE OF IMPLANTABLE SUBDERMAL CONTRACEPTIVE: Primary | ICD-10-CM

## 2024-10-13 NOTE — PROGRESS NOTES
"Myrna Bauman is a 24 y.o. female.     Cc:  Birth control issues    History of Present Illness - Patient is a 24 year old female who presents with contraceptive issues.  Patient has had a Nexplanon in place for over a year.  She reports that implant initially worked well until she experienced a fall and hit the implant.  She was concerned about rupture of the implant capsule but had imaging that inferred integrity of the device.  However, since she had \"trauma\" to implant site, patient reports issues with prolonged and heavy bleeding with cramping.  She would like to consider removal with placement of IUD.    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  She  reports that she quit smoking about 6 years ago. Her smoking use included cigarettes. She has never used smokeless tobacco. She reports that she does not currently use alcohol. She reports current drug use. Frequency: 2.00 times per week. Drug: Marijuana.  Current Outpatient Medications   Medication Sig Dispense Refill    Balziva 0.4-35 MG-MCG per tablet TAKE 1 TABLET BY MOUTH DAILY 28 tablet 0    Etonogestrel (NEXPLANON SC) Inject  under the skin into the appropriate area as directed.      sertraline (Zoloft) 50 MG tablet Take 1 tab PO QD for mood 90 tablet 3    acetaminophen (TYLENOL) 500 MG tablet Take 2 tablets by mouth Every 6 (Six) Hours As Needed for Mild Pain. (Patient not taking: " Reported on 7/24/2024) 20 tablet 0    estradiol (Estrace) 2 MG tablet Take 1 tablet by mouth Daily for 14 days. 14 tablet 0    lidocaine (LIDODERM) 5 % Place 1 patch on the skin as directed by provider Daily. Remove & Discard patch within 12 hours or as directed by MD (Patient not taking: Reported on 7/24/2024) 5 each 0     No current facility-administered medications for this visit.     She is allergic to bee venom..    Review of Systems   Constitutional:  Negative for chills and fever.   Genitourinary:  Positive for menstrual problem and vaginal bleeding.       Objective   Physical Exam  Constitutional:       Appearance: Normal appearance.   Neurological:      Mental Status: She is alert.   Psychiatric:         Mood and Affect: Mood normal.         Behavior: Behavior normal.         Thought Content: Thought content normal.         Judgment: Judgment normal.       Assessment & Plan   Diagnoses and all orders for this visit:    1. Surveillance of implantable subdermal contraceptive (Primary)  - Patient reports bleeding issues despite trial of medical interventions.  She reports having less issues with IUD and would like to try that again.  Her last IUD was Kristin type and she had this in place for 3 years before removing it for conception.  Will return to Progestin-IUD and will remove capsule at time of insertion of new IUD.    Anthony Esteves MD    I spent a total of 30 minutes with patient including composing note and counseling.

## 2024-10-28 ENCOUNTER — PROCEDURE VISIT (OUTPATIENT)
Dept: OBSTETRICS AND GYNECOLOGY | Facility: CLINIC | Age: 24
End: 2024-10-28
Payer: COMMERCIAL

## 2024-10-28 VITALS
SYSTOLIC BLOOD PRESSURE: 148 MMHG | WEIGHT: 202 LBS | HEIGHT: 67 IN | DIASTOLIC BLOOD PRESSURE: 101 MMHG | BODY MASS INDEX: 31.71 KG/M2

## 2024-10-28 DIAGNOSIS — Z30.430 ENCOUNTER FOR IUD INSERTION: ICD-10-CM

## 2024-10-28 DIAGNOSIS — Z30.46 NEXPLANON REMOVAL: Primary | ICD-10-CM

## 2024-10-28 PROCEDURE — 11982 REMOVE DRUG IMPLANT DEVICE: CPT | Performed by: OBSTETRICS & GYNECOLOGY

## 2024-10-28 PROCEDURE — 58300 INSERT INTRAUTERINE DEVICE: CPT | Performed by: OBSTETRICS & GYNECOLOGY

## 2024-10-28 NOTE — PROGRESS NOTES
Procedures  Nexplanon Removal    Date of Insertion:  May 26, 2023  Date of Removal:  October 28, 2024    Information related to removal of the implant:   Reason(s) for removal:  Irregular bleeding  Was implant palpable before removal?  Yes    Procedure Time Out Documentation  The risks of the procedure were reviewed with the patient including bleeding, infection and unlikely damage to the uterus and the benefits of the procedure were explained to the patient and Verbal informed consent was obtained      Procedure:    Implant identified.  Left upper arm prepped with Betadinex3.  1% lidocaine injected at planned incision site.      A vertical incision 3 mm was performed with scalpel at the distal end of implant.  The implant was removed using a pop-out technique. The implant was inspected and found to be intact and complete.  Steri strips and a pressure dressing were applied to the site.  After removal instructions were given and verbally reviewed with the patient who acknowledged her understanding.    Difficulties with the implant removal procedure?  no    Patient tolerated the procedure well without complications.      IUD Insertion Procedure Note    Indication: Desires long acting reversible contraception     Procedure Details   The risks (including infection, bleeding, pain, and uterine perforation) and benefits of the procedure were explained to the patient and Verbal informed consent was obtained.      Cervix cleansed with Betadine. Uterus sounded to 7 cm. IUD inserted without difficulty. String visible and trimmed.    IUD Information:  Mirena, Lot # [id number], Expiration date 12/2026, NDC 34435-936-22.    Condition:  Stable    Complications:  None    Patient tolerated the procedure well without complications.    Plan:  The patient was advised to call for any fever or for prolonged or severe pain or bleeding. She was advised to use NSAID as needed for mild to moderate pain.  Follow up in one month for IUD  check.      Anthony Esteves MD  10/28/2024  13:55 EDT

## 2024-12-10 RX ORDER — NORETHINDRONE AND ETHINYL ESTRADIOL 0.4-0.035
1 KIT ORAL DAILY
Qty: 28 TABLET | Refills: 0 | OUTPATIENT
Start: 2024-12-10

## 2025-02-18 ENCOUNTER — OFFICE VISIT (OUTPATIENT)
Dept: INTERNAL MEDICINE | Facility: CLINIC | Age: 25
End: 2025-02-18
Payer: COMMERCIAL

## 2025-02-18 VITALS
DIASTOLIC BLOOD PRESSURE: 70 MMHG | SYSTOLIC BLOOD PRESSURE: 114 MMHG | TEMPERATURE: 97.5 F | HEART RATE: 106 BPM | OXYGEN SATURATION: 99 %

## 2025-02-18 DIAGNOSIS — Z13.89 SCREENING FOR BLOOD OR PROTEIN IN URINE: ICD-10-CM

## 2025-02-18 DIAGNOSIS — Z23 NEED FOR HPV VACCINATION: ICD-10-CM

## 2025-02-18 DIAGNOSIS — R10.13 DYSPEPSIA: ICD-10-CM

## 2025-02-18 DIAGNOSIS — Z00.00 ANNUAL PHYSICAL EXAM: Primary | ICD-10-CM

## 2025-02-18 DIAGNOSIS — E78.5 HYPERLIPIDEMIA, UNSPECIFIED HYPERLIPIDEMIA TYPE: ICD-10-CM

## 2025-02-18 DIAGNOSIS — Z11.3 ROUTINE SCREENING FOR STI (SEXUALLY TRANSMITTED INFECTION): ICD-10-CM

## 2025-02-18 DIAGNOSIS — Z59.9 FINANCIAL DIFFICULTIES: ICD-10-CM

## 2025-02-18 DIAGNOSIS — F33.1 MODERATE EPISODE OF RECURRENT MAJOR DEPRESSIVE DISORDER: ICD-10-CM

## 2025-02-18 DIAGNOSIS — R73.09 ELEVATED GLUCOSE: ICD-10-CM

## 2025-02-18 PROBLEM — M46.1 SACROILIAC INFLAMMATION: Status: RESOLVED | Noted: 2024-01-08 | Resolved: 2025-02-18

## 2025-02-18 PROBLEM — Z97.5 NEXPLANON IN PLACE: Status: RESOLVED | Noted: 2024-01-08 | Resolved: 2025-02-18

## 2025-02-18 PROCEDURE — 90651 9VHPV VACCINE 2/3 DOSE IM: CPT | Performed by: FAMILY MEDICINE

## 2025-02-18 PROCEDURE — 2014F MENTAL STATUS ASSESS: CPT | Performed by: FAMILY MEDICINE

## 2025-02-18 PROCEDURE — 99395 PREV VISIT EST AGE 18-39: CPT | Performed by: FAMILY MEDICINE

## 2025-02-18 PROCEDURE — 1126F AMNT PAIN NOTED NONE PRSNT: CPT | Performed by: FAMILY MEDICINE

## 2025-02-18 PROCEDURE — 90471 IMMUNIZATION ADMIN: CPT | Performed by: FAMILY MEDICINE

## 2025-02-18 RX ORDER — BUPROPION HYDROCHLORIDE 150 MG/1
150 TABLET ORAL DAILY
Qty: 60 TABLET | Refills: 1 | Status: SHIPPED | OUTPATIENT
Start: 2025-02-18

## 2025-02-18 SDOH — ECONOMIC STABILITY - INCOME SECURITY: PROBLEM RELATED TO HOUSING AND ECONOMIC CIRCUMSTANCES, UNSPECIFIED: Z59.9
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
## 2025-02-19 ENCOUNTER — REFERRAL TRIAGE (OUTPATIENT)
Age: 25
End: 2025-02-19
Payer: COMMERCIAL

## 2025-02-20 LAB
ALBUMIN SERPL-MCNC: 4.4 G/DL (ref 4–5)
ALP SERPL-CCNC: 80 IU/L (ref 44–121)
ALT SERPL-CCNC: 16 IU/L (ref 0–32)
APPEARANCE UR: CLEAR
AST SERPL-CCNC: 15 IU/L (ref 0–40)
BACTERIA #/AREA URNS HPF: NORMAL /[HPF]
BASOPHILS # BLD AUTO: 0 X10E3/UL (ref 0–0.2)
BASOPHILS NFR BLD AUTO: 1 %
BILIRUB SERPL-MCNC: 0.3 MG/DL (ref 0–1.2)
BILIRUB UR QL STRIP: NEGATIVE
BUN SERPL-MCNC: 7 MG/DL (ref 6–20)
BUN/CREAT SERPL: 9 (ref 9–23)
C TRACH RRNA SPEC QL NAA+PROBE: NEGATIVE
CALCIUM SERPL-MCNC: 9.3 MG/DL (ref 8.7–10.2)
CASTS URNS QL MICRO: NORMAL /LPF
CHLORIDE SERPL-SCNC: 104 MMOL/L (ref 96–106)
CHOLEST SERPL-MCNC: 145 MG/DL (ref 100–199)
CO2 SERPL-SCNC: 22 MMOL/L (ref 20–29)
COLOR UR: YELLOW
CREAT SERPL-MCNC: 0.74 MG/DL (ref 0.57–1)
EGFRCR SERPLBLD CKD-EPI 2021: 114 ML/MIN/1.73
EOSINOPHIL # BLD AUTO: 0.1 X10E3/UL (ref 0–0.4)
EOSINOPHIL NFR BLD AUTO: 2 %
EPI CELLS #/AREA URNS HPF: NORMAL /HPF (ref 0–10)
ERYTHROCYTE [DISTWIDTH] IN BLOOD BY AUTOMATED COUNT: 13.4 % (ref 11.7–15.4)
GLOBULIN SER CALC-MCNC: 2.8 G/DL (ref 1.5–4.5)
GLUCOSE SERPL-MCNC: 109 MG/DL (ref 70–99)
GLUCOSE UR QL STRIP: NEGATIVE
HBA1C MFR BLD: 5.4 % (ref 4.8–5.6)
HCT VFR BLD AUTO: 42.3 % (ref 34–46.6)
HDLC SERPL-MCNC: 44 MG/DL
HGB BLD-MCNC: 13.4 G/DL (ref 11.1–15.9)
HGB UR QL STRIP: NEGATIVE
HIV 1+2 AB+HIV1 P24 AG SERPL QL IA: NON REACTIVE
IMM GRANULOCYTES # BLD AUTO: 0 X10E3/UL (ref 0–0.1)
IMM GRANULOCYTES NFR BLD AUTO: 1 %
KETONES UR QL STRIP: NEGATIVE
LDLC SERPL CALC-MCNC: 90 MG/DL (ref 0–99)
LEUKOCYTE ESTERASE UR QL STRIP: NEGATIVE
LYMPHOCYTES # BLD AUTO: 1.5 X10E3/UL (ref 0.7–3.1)
LYMPHOCYTES NFR BLD AUTO: 20 %
MCH RBC QN AUTO: 27.7 PG (ref 26.6–33)
MCHC RBC AUTO-ENTMCNC: 31.7 G/DL (ref 31.5–35.7)
MCV RBC AUTO: 88 FL (ref 79–97)
MICRO URNS: NORMAL
MICRO URNS: NORMAL
MONOCYTES # BLD AUTO: 0.5 X10E3/UL (ref 0.1–0.9)
MONOCYTES NFR BLD AUTO: 6 %
N GONORRHOEA RRNA SPEC QL NAA+PROBE: NEGATIVE
NEUTROPHILS # BLD AUTO: 5.2 X10E3/UL (ref 1.4–7)
NEUTROPHILS NFR BLD AUTO: 70 %
NITRITE UR QL STRIP: NEGATIVE
PH UR STRIP: 6.5 [PH] (ref 5–7.5)
PLATELET # BLD AUTO: 248 X10E3/UL (ref 150–450)
POTASSIUM SERPL-SCNC: 4.4 MMOL/L (ref 3.5–5.2)
PROT SERPL-MCNC: 7.2 G/DL (ref 6–8.5)
PROT UR QL STRIP: NEGATIVE
RBC # BLD AUTO: 4.83 X10E6/UL (ref 3.77–5.28)
RBC #/AREA URNS HPF: NORMAL /HPF (ref 0–2)
RPR SER QL: REACTIVE
RPR SER-TITR: ABNORMAL TITER
SODIUM SERPL-SCNC: 139 MMOL/L (ref 134–144)
SP GR UR STRIP: 1.02 (ref 1–1.03)
T VAGINALIS RRNA SPEC QL NAA+PROBE: NEGATIVE
T3FREE SERPL-MCNC: 3.2 PG/ML (ref 2–4.4)
T4 FREE SERPL-MCNC: 1.17 NG/DL (ref 0.82–1.77)
TRIGL SERPL-MCNC: 49 MG/DL (ref 0–149)
TSH SERPL DL<=0.005 MIU/L-ACNC: 0.61 UIU/ML (ref 0.45–4.5)
UROBILINOGEN UR STRIP-MCNC: 0.2 MG/DL (ref 0.2–1)
VLDLC SERPL CALC-MCNC: 11 MG/DL (ref 5–40)
WBC # BLD AUTO: 7.4 X10E3/UL (ref 3.4–10.8)
WBC #/AREA URNS HPF: NORMAL /HPF (ref 0–5)

## 2025-02-21 ENCOUNTER — PATIENT OUTREACH (OUTPATIENT)
Age: 25
End: 2025-02-21
Payer: COMMERCIAL

## 2025-02-21 NOTE — OUTREACH NOTE
Social Work Assessment  Questions/Answers      Flowsheet Row Most Recent Value   Referral Source physician, outpatient staff, outpatient clinic   Reason for Consult community resources, financial concerns, other (see comments)  [childcare concerns, employment concerns]   Preferred Language English   Advance Care Planning Reviewed no concerns identified   People in Home child(martha), dependent, parent(s)   Current Living Arrangements home   Potentially Unsafe Housing Conditions none   In the past 12 months has the electric, gas, oil, or water company threatened to shut off services in your home? No   Primary Care Provided by self   Provides Primary Care For child(martha)   Employment Status unemployed   Source of Income none   Application for Public Assistance pending public assistance pending number   Medications independent   Meal Preparation independent   Housekeeping independent   Laundry independent   Shopping independent   If for any reason you need help with day-to-day activities such as bathing, preparing meals, shopping, managing finances, etc., do you get the help you need? I don't need any help   Sources of Support community support   Do you want help finding or keeping work or a job? Yes, help finding work   Do you want help with school or training? For example, starting or completing job training or getting a high school diploma, GED or equivalent No   Transportation Concerns none   Transportation Anticipated car, drives self   Current Discharge Risk financial support inadequate          SDOH updated and reviewed with the patient during this program:  --     Disabilities: Not At Risk (11/2/2022)    Disabilities     Concentrating, Remembering, or Making Decisions Difficulty: no     Doing Errands Independently Difficulty: no      --     Employment: At Risk (2/21/2025)    Employment     Do you want help finding or keeping work or a job?: Yes, help finding work      Financial Resource Strain: High Risk  (2/21/2025)    Overall Financial Resource Strain (CARDIA)     Difficulty of Paying Living Expenses: Hard      --     Food Insecurity: Food Insecurity Present (2/21/2025)    Hunger Vital Sign     Worried About Running Out of Food in the Last Year: Sometimes true     Ran Out of Food in the Last Year: Sometimes true      --     Health Literacy: Not At Risk (2/21/2025)    Education     Help with school or training?: No     Preferred Language: English      --     Housing Stability: High Risk (2/21/2025)    Housing Stability Vital Sign     Unable to Pay for Housing in the Last Year: No     Number of Times Moved in the Last Year: 2     Homeless in the Last Year: No      --     Transportation Needs: No Transportation Needs (2/21/2025)    PRAPARE - Transportation     Lack of Transportation (Medical): No     Lack of Transportation (Non-Medical): No      --     Utilities: Not At Risk (2/21/2025)    [facility] Utilities     Threatened with loss of utilities: No      Continuing Care   [facility]    [address], [place] [address]    Phone: [phone]    Request Status: Accepted    Services: Help Find Housing, Housing Insecurity Services, Public Housing    Resource for: Housing Stability   [place] [facility]    [address] [address]    Phone: [phone]    Request Status: Declined    Services: Food Insecurity Services    Resource for: Food Insecurity   [address] [address]    Phone: [phone]    Request Status: Accepted    Services: Adult Daycares, Elder Community Support/Recreation, Financial Assistance, Food Insecurity Services    Resource for: Financial Resource Strain, Food Insecurity, Social Connections, Utilities     Patient Outreach    MSW outreach to patient as requested per primary care provider referral for assistance with community resource needs. Patient and MSW  discussed financial concerns, food concerns, and employment concerns for patient. MSW and patient discussed food assistance that patient may qualify. Patient states that she is currently living with her parents and would not qualify for Supplemental Nutritional Assistance Program (SNAP) benefits due to her parents income. MSW and patient discussed utilizing [facility] for financial and food resources. Patient and MSW discussed concerns with housing, employment, and childcare. Patient states that she is not able to stay employed due to concerns with childcare and the hours offered by childcare assistance program. MSW offered patient the link to review childcare options available with hours listed. Patient discussed that she would prefer to move back to [facility] and MSW will also provide a list of low income housing for patient to explore. MSW has sent all community resource information to patient via e-mail as requested. Patient to follow-up with MSW as needed.     Josefina JEFF -   Ambulatory Case Management    2/21/2025, 14:51 EST

## 2025-03-28 DIAGNOSIS — F33.1 MODERATE EPISODE OF RECURRENT MAJOR DEPRESSIVE DISORDER: ICD-10-CM

## 2025-03-31 RX ORDER — BUPROPION HYDROCHLORIDE 150 MG/1
150 TABLET ORAL DAILY
Qty: 30 TABLET | Refills: 1 | Status: SHIPPED | OUTPATIENT
Start: 2025-03-31

## 2025-07-22 DIAGNOSIS — F33.1 MODERATE EPISODE OF RECURRENT MAJOR DEPRESSIVE DISORDER: ICD-10-CM

## 2025-07-22 RX ORDER — BUPROPION HYDROCHLORIDE 150 MG/1
150 TABLET ORAL DAILY
Qty: 30 TABLET | Refills: 0 | Status: SHIPPED | OUTPATIENT
Start: 2025-07-22

## 2025-08-05 ENCOUNTER — PATIENT ROUNDING (BHMG ONLY) (OUTPATIENT)
Dept: FAMILY MEDICINE CLINIC | Facility: CLINIC | Age: 25
End: 2025-08-05
Payer: COMMERCIAL

## 2025-08-05 ENCOUNTER — OFFICE VISIT (OUTPATIENT)
Dept: FAMILY MEDICINE CLINIC | Facility: CLINIC | Age: 25
End: 2025-08-05
Payer: COMMERCIAL

## 2025-08-05 VITALS
DIASTOLIC BLOOD PRESSURE: 82 MMHG | OXYGEN SATURATION: 98 % | TEMPERATURE: 97.7 F | BODY MASS INDEX: 30.92 KG/M2 | WEIGHT: 197 LBS | HEART RATE: 84 BPM | HEIGHT: 67 IN | SYSTOLIC BLOOD PRESSURE: 126 MMHG

## 2025-08-05 DIAGNOSIS — F33.1 MODERATE EPISODE OF RECURRENT MAJOR DEPRESSIVE DISORDER: Primary | ICD-10-CM

## 2025-08-05 PROCEDURE — 99213 OFFICE O/P EST LOW 20 MIN: CPT | Performed by: FAMILY MEDICINE

## 2025-08-05 RX ORDER — BUPROPION HYDROCHLORIDE 150 MG/1
150 TABLET ORAL DAILY
Qty: 90 TABLET | Refills: 3 | Status: SHIPPED | OUTPATIENT
Start: 2025-08-05

## 2025-08-21 ENCOUNTER — OFFICE VISIT (OUTPATIENT)
Dept: FAMILY MEDICINE CLINIC | Facility: CLINIC | Age: 25
End: 2025-08-21
Payer: COMMERCIAL

## 2025-08-21 VITALS
HEART RATE: 84 BPM | TEMPERATURE: 97.8 F | HEIGHT: 67 IN | BODY MASS INDEX: 31.08 KG/M2 | DIASTOLIC BLOOD PRESSURE: 88 MMHG | OXYGEN SATURATION: 99 % | WEIGHT: 198 LBS | SYSTOLIC BLOOD PRESSURE: 120 MMHG

## 2025-08-21 DIAGNOSIS — Z00.00 HEALTH CARE MAINTENANCE: Primary | ICD-10-CM

## 2025-08-21 DIAGNOSIS — R53.83 OTHER FATIGUE: ICD-10-CM

## 2025-08-21 DIAGNOSIS — R10.84 GENERALIZED ABDOMINAL PAIN: Primary | ICD-10-CM

## 2025-08-21 DIAGNOSIS — N89.8 VAGINAL DISCHARGE: ICD-10-CM

## 2025-08-21 LAB
B-HCG UR QL: NEGATIVE
BILIRUB BLD-MCNC: NEGATIVE MG/DL
CLARITY, POC: CLEAR
COLOR UR: ABNORMAL
EXPIRATION DATE: NORMAL
GLUCOSE UR STRIP-MCNC: NEGATIVE MG/DL
INTERNAL NEGATIVE CONTROL: NORMAL
INTERNAL POSITIVE CONTROL: NORMAL
KETONES UR QL: NEGATIVE
LEUKOCYTE EST, POC: ABNORMAL
Lab: NORMAL
NITRITE UR-MCNC: NEGATIVE MG/ML
PH UR: 7 [PH] (ref 5–8)
PROT UR STRIP-MCNC: NEGATIVE MG/DL
RBC # UR STRIP: ABNORMAL /UL
SP GR UR: 1 (ref 1–1.03)
UROBILINOGEN UR QL: NORMAL

## 2025-08-21 RX ORDER — DOXYCYCLINE 100 MG/1
100 CAPSULE ORAL 2 TIMES DAILY
Qty: 20 CAPSULE | Refills: 0 | Status: SHIPPED | OUTPATIENT
Start: 2025-08-21

## 2025-08-21 RX ORDER — CEFTRIAXONE 500 MG/1
500 INJECTION, POWDER, FOR SOLUTION INTRAMUSCULAR; INTRAVENOUS ONCE
Status: DISCONTINUED | OUTPATIENT
Start: 2025-08-21 | End: 2025-08-21

## 2025-08-23 LAB
A VAGINAE DNA VAG QL NAA+PROBE: NORMAL SCORE
BVAB2 DNA VAG QL NAA+PROBE: NORMAL SCORE
C ALBICANS DNA VAG QL NAA+PROBE: NEGATIVE
C GLABRATA DNA VAG QL NAA+PROBE: NEGATIVE
C TRACH DNA SPEC QL NAA+PROBE: NEGATIVE
MEGA1 DNA VAG QL NAA+PROBE: NORMAL SCORE
N GONORRHOEA DNA VAG QL NAA+PROBE: NEGATIVE
T VAGINALIS DNA VAG QL NAA+PROBE: NEGATIVE